# Patient Record
Sex: FEMALE | Race: WHITE | NOT HISPANIC OR LATINO | Employment: FULL TIME | URBAN - METROPOLITAN AREA
[De-identification: names, ages, dates, MRNs, and addresses within clinical notes are randomized per-mention and may not be internally consistent; named-entity substitution may affect disease eponyms.]

---

## 2017-03-17 ENCOUNTER — TELEPHONE (OUTPATIENT)
Dept: FAMILY MEDICINE | Facility: CLINIC | Age: 57
End: 2017-03-17

## 2017-03-17 NOTE — TELEPHONE ENCOUNTER
Health Maintenance Due   Topic Date Due     ADVANCE DIRECTIVE PLANNING Q5 YRS (NO INBASKET)  09/19/1978     BMP Q6 MOS (NO INBASKET)  01/08/2017      BP Readings from Last 3 Encounters:   11/10/16 125/78   07/08/16 129/79   06/23/15 126/70      11/2016 A1C 8.1 she wanted to try lifestyle management, not meds.    Please call patient and ask her to schedule a diabetes visit with me when she gets a chance, thanks! -Gretchen

## 2017-05-12 ENCOUNTER — TELEPHONE (OUTPATIENT)
Dept: FAMILY MEDICINE | Facility: CLINIC | Age: 57
End: 2017-05-12

## 2017-05-12 NOTE — TELEPHONE ENCOUNTER
Health Maintenance Due   Topic Date Due     ADVANCE DIRECTIVE PLANNING Q5 YRS (NO INBASKET)  09/19/1978     BMP Q6 MOS (NO INBASKET)  01/08/2017     A1C Q3 MO( NO INBASKET)  02/10/2017      A1C 8.5, refused metformin 10/11/2016  Please call her to to schedule a follow up diabetes appointment with me,  thanks!  -Gretchen

## 2017-06-07 PROBLEM — E11.65 TYPE 2 DIABETES MELLITUS WITH HYPERGLYCEMIA, WITHOUT LONG-TERM CURRENT USE OF INSULIN (H): Status: ACTIVE | Noted: 2017-06-07

## 2017-06-07 PROBLEM — E78.1 HIGH BLOOD TRIGLYCERIDES: Status: ACTIVE | Noted: 2017-06-07

## 2017-06-08 ENCOUNTER — OFFICE VISIT (OUTPATIENT)
Dept: FAMILY MEDICINE | Facility: CLINIC | Age: 57
End: 2017-06-08
Payer: COMMERCIAL

## 2017-06-08 VITALS
WEIGHT: 258 LBS | OXYGEN SATURATION: 93 % | DIASTOLIC BLOOD PRESSURE: 75 MMHG | SYSTOLIC BLOOD PRESSURE: 121 MMHG | BODY MASS INDEX: 34.95 KG/M2 | TEMPERATURE: 97.8 F | HEART RATE: 69 BPM | HEIGHT: 72 IN

## 2017-06-08 DIAGNOSIS — E11.65 TYPE 2 DIABETES MELLITUS WITH HYPERGLYCEMIA, WITHOUT LONG-TERM CURRENT USE OF INSULIN (H): Primary | ICD-10-CM

## 2017-06-08 DIAGNOSIS — E78.1 HIGH BLOOD TRIGLYCERIDES: ICD-10-CM

## 2017-06-08 DIAGNOSIS — E66.01 MORBID OBESITY DUE TO EXCESS CALORIES (H): ICD-10-CM

## 2017-06-08 DIAGNOSIS — Z71.89 ADVANCE CARE PLANNING: ICD-10-CM

## 2017-06-08 LAB
ALBUMIN SERPL-MCNC: 3.5 G/DL (ref 3.4–5)
ALP SERPL-CCNC: 93 U/L (ref 40–150)
ALT SERPL W P-5'-P-CCNC: 29 U/L (ref 0–50)
ANION GAP SERPL CALCULATED.3IONS-SCNC: 9 MMOL/L (ref 3–14)
AST SERPL W P-5'-P-CCNC: 27 U/L (ref 0–45)
BILIRUB SERPL-MCNC: 0.5 MG/DL (ref 0.2–1.3)
BUN SERPL-MCNC: 15 MG/DL (ref 7–30)
CALCIUM SERPL-MCNC: 9.6 MG/DL (ref 8.5–10.1)
CHLORIDE SERPL-SCNC: 101 MMOL/L (ref 94–109)
CHOLEST SERPL-MCNC: 162 MG/DL
CO2 SERPL-SCNC: 28 MMOL/L (ref 20–32)
CREAT SERPL-MCNC: 0.61 MG/DL (ref 0.52–1.04)
GFR SERPL CREATININE-BSD FRML MDRD: ABNORMAL ML/MIN/1.7M2
GLUCOSE SERPL-MCNC: 171 MG/DL (ref 70–99)
HBA1C MFR BLD: 8.2 % (ref 4.3–6)
HDLC SERPL-MCNC: 38 MG/DL
LDLC SERPL CALC-MCNC: 76 MG/DL
NONHDLC SERPL-MCNC: 124 MG/DL
POTASSIUM SERPL-SCNC: 3.5 MMOL/L (ref 3.4–5.3)
PROT SERPL-MCNC: 8.1 G/DL (ref 6.8–8.8)
SODIUM SERPL-SCNC: 138 MMOL/L (ref 133–144)
TRIGL SERPL-MCNC: 240 MG/DL

## 2017-06-08 PROCEDURE — 36415 COLL VENOUS BLD VENIPUNCTURE: CPT | Performed by: FAMILY MEDICINE

## 2017-06-08 PROCEDURE — 80053 COMPREHEN METABOLIC PANEL: CPT | Performed by: FAMILY MEDICINE

## 2017-06-08 PROCEDURE — 80061 LIPID PANEL: CPT | Performed by: FAMILY MEDICINE

## 2017-06-08 PROCEDURE — 83036 HEMOGLOBIN GLYCOSYLATED A1C: CPT | Performed by: FAMILY MEDICINE

## 2017-06-08 PROCEDURE — 99214 OFFICE O/P EST MOD 30 MIN: CPT | Performed by: FAMILY MEDICINE

## 2017-06-08 NOTE — MR AVS SNAPSHOT
After Visit Summary   6/8/2017    Lesli Jackson    MRN: [id number]           Patient Information     Date Of Birth          1960        Visit Information        Provider Department      6/8/2017 8:20 AM Gretchen Vargas MD [facility]        Today's Diagnoses     Type 2 diabetes mellitus with hyperglycemia, without long-term current use of insulin (H)    -  1    Morbid obesity due to excess calories (H)        Obesity, Class II, BMI 35.0-39.9, with comorbidity (see actual BMI) (H)        High blood triglycerides          Care Instructions    Results for orders placed or performed in visit on 06/08/17   Hemoglobin A1c   Result Value Ref Range    Hemoglobin A1C 8.2 (H) 4.3 - 6.0 %                Follow-ups after your visit        Who to contact     If you have questions or need follow up information about today's clinic visit or your schedule please contact [facility] directly at [phone].  Normal or non-critical lab and imaging results will be communicated to you by PartyLinehart, letter or phone within 4 business days after the clinic has received the results. If you do not hear from us within 7 days, please contact the clinic through Preventicet or phone. If you have a critical or abnormal lab result, we will notify you by phone as soon as possible.  Submit refill requests through benchee or call your pharmacy and they will forward the refill request to us. Please allow 3 business days for your refill to be completed.          Additional Information About Your Visit        MyChart Information     benchee gives you secure access to your electronic health record. If you see a primary care provider, you can also send messages to your care team and make appointments. If you have questions, please call your primary care clinic.  If you do not have a primary care provider, please call [phone] and they will assist you.        Care EveryWhere ID     This is your Care  EveryWhere ID. This could be used by other organizations to access your [place] medical records  [id number]        Your Vitals Were     Pulse Temperature Height Pulse Oximetry BMI (Body Mass Index)       69 97.8  F (36.6  C) (Tympanic) 6' (1.829 m) 93% 34.99 kg/m2        Blood Pressure from Last 3 Encounters:   06/08/17 121/75   11/10/16 125/78   07/08/16 129/79    Weight from Last 3 Encounters:   06/08/17 258 lb (117 kg)   11/10/16 263 lb (119.3 kg)   07/08/16 267 lb (121.1 kg)              We Performed the Following     Comprehensive metabolic panel     Hemoglobin A1c     Lipid panel reflex to direct LDL        Primary Care Provider Office Phone # Fax #    Gretchen Vargas [phone]       [facility] [address]  [facility] [address]        Thank you!     Thank you for choosing [facility]  for your care. Our goal is always to provide you with excellent care. Hearing back from our patients is one way we can continue to improve our services. Please take a few minutes to complete the written survey that you may receive in the mail after your visit with us. Thank you!             Your Updated Medication List - Protect others around you: Learn how to safely use, store and throw away your medicines at www.disposemymeds.org.          This list is accurate as of: 6/8/17  8:53 AM.  Always use your most recent med list.                   Brand Name Dispense Instructions for use    aspirin 81 MG tablet     100    ONE DAILY       atenolol 50 MG tablet    TENORMIN    90 tablet    Take 1 tablet (50 mg) by mouth daily       blood glucose monitoring lancets          flaxseed oil 1000 MG Caps     90 capsule    Take 2 capsules by mouth daily.       hydrochlorothiazide 50 MG tablet    HYDRODIURIL    90 tablet    Take 1 tablet (50 mg) by mouth every morning       lisinopril 40 MG tablet    PRINIVIL/ZESTRIL    135 tablet    Take 1.5 tablets (60 mg) by mouth daily        lovastatin 20 MG tablet    MEVACOR    45 tablet    Take 0.5 tablets (10 mg) by mouth At Bedtime 1/2 tablet a day       MULTIPLE VIT-MIN-CALCIUM-FA PO      Take  by mouth.

## 2017-06-08 NOTE — PROGRESS NOTES
Patient was seen today in clinic.  I discussed results in clinic, please see clinic progress note.    Gretchen Vargas 6/8/2017

## 2017-06-08 NOTE — PROGRESS NOTES
SUBJECTIVE:                                                    Lesli Jackson is a 56 year old female who presents to clinic today for the following health issues:      History of Present Illness   Diabetes:     Frequency of checking blood sugars::  Not at all    Diabetic concerns::  None    Hypoglycemia symptoms::  None    Paraesthesia present::  No    Eye Exam in the last year::  NO  Diet::  Vegetarian/vegan  Frequency of exercise::  6-7 days/week  Duration of exercise::  30-45 minutes  Taking medications regularly::  Yes  Medication side effects::  Not applicable  Additional concerns today::  No    Hyperlipidemia  She's currently on statin, trying to improve her diet.She's walking more now, eating a little better, has lost some weight.  She has more trouble exercising in the winter, doesn't like to walk outside.  Does have a treadmill.  She tries to exercise after eating.    Wt Readings from Last 4 Encounters:   06/08/17 258 lb (117 kg)   11/10/16 263 lb (119.3 kg)   07/08/16 267 lb (121.1 kg)   06/23/15 267 lb (121.1 kg)      Problem list and histories reviewed & adjusted, as indicated.  Additional history: as documented        Patient Active Problem List   Diagnosis     Mixed hyperlipidemia     Other specified idiopathic peripheral neuropathy     HYPERLIPIDEMIA LDL GOAL <130     Idiopathic edema     Nevus     Benign neoplasm of skin of trunk, except scrotum     Skin tag     Essential hypertension with goal blood pressure less than 140/90     Swelling of limb     Localized swelling, mass and lump, neck     Tinea corporis     Morbid obesity due to excess calories (H)     Obesity, Class II, BMI 35.0-39.9, with comorbidity (see actual BMI) (H)     Abnormal vaginal bleeding     Type 2 diabetes mellitus with hyperglycemia, without long-term current use of insulin (H)     High blood triglycerides     Past Surgical History:   Procedure Laterality Date     COLONOSCOPY  2/16/11    repeat 3 yrs, one inconclusive area        Social History   Substance Use Topics     Smoking status: Former Smoker     Years: 20.00     Smokeless tobacco: Former User      Comment: 5 cigarettes day or less     Alcohol use Yes      Comment: 1-2  beer daily on average     Family History   Problem Relation Age of Onset     C.A.D. Father      DIABETES Father      CEREBROVASCULAR DISEASE Father      Eye Disorder Father      cataracts     HEART DISEASE Father      Hypertension Mother      Arthritis Mother      CANCER Mother      lymphoma     Prostate Cancer Maternal Uncle      Allergies Sister      Eye Disorder Other      iritis     Lipids Other      one of her parents     Thyroid Disease Sister          Current Outpatient Prescriptions   Medication Sig Dispense Refill     lisinopril (PRINIVIL,ZESTRIL) 40 MG tablet Take 1.5 tablets (60 mg) by mouth daily 135 tablet 3     hydrochlorothiazide (HYDRODIURIL) 50 MG tablet Take 1 tablet (50 mg) by mouth every morning 90 tablet 3     atenolol (TENORMIN) 50 MG tablet Take 1 tablet (50 mg) by mouth daily 90 tablet 3     lovastatin (MEVACOR) 20 MG tablet Take 0.5 tablets (10 mg) by mouth At Bedtime 1/2 tablet a day 45 tablet 3     blood glucose monitoring (ACCU-CHEK FASTCLIX) lancets        MULTIPLE VIT-MIN-CALCIUM-FA PO Take  by mouth.       Flaxseed, Linseed, (FLAXSEED OIL) 1000 MG CAPS Take 2 capsules by mouth daily. 90 capsule 0     ASPIRIN 81 MG OR TABS ONE DAILY 100 3     Allergies   Allergen Reactions     Amoxicillin Rash     Recent Labs   Lab Test  06/08/17   0827  11/10/16   1023  07/08/16   0957  06/23/15   0941  06/02/14   0808   A1C  8.2*  8.5*  8.7*  7.0*  6.5*   LDL   --    --   85  72  85   HDL   --    --   35*  36*  30*   TRIG   --    --   258*  246*  264*   ALT   --    --   35  27  22   CR   --    --   0.74  0.71  0.85   GFRESTIMATED   --    --   81  85  70   GFRESTBLACK   --    --   >90   GFR Calc    >90   GFR Calc    85   POTASSIUM   --    --   3.6  3.8  4.2   TSH   --     --   3.16   --    --       BP Readings from Last 3 Encounters:   06/08/17 121/75   11/10/16 125/78   07/08/16 129/79    Wt Readings from Last 3 Encounters:   06/08/17 258 lb (117 kg)   11/10/16 263 lb (119.3 kg)   07/08/16 267 lb (121.1 kg)         Triglycerides   Date Value Ref Range Status   07/08/2016 258 (H) <150 mg/dL Final     Comment:     Borderline high:  150-199 mg/dl   High:             200-499 mg/dl   Very high:       >499 mg/dl   Fasting specimen     ]  ROS:  Constitutional, HEENT, cardiovascular, pulmonary, gi and gu systems are negative, except as otherwise noted.    OBJECTIVE:                                                    /75  Pulse 69  Temp 97.8  F (36.6  C) (Tympanic)  Ht 6' (1.829 m)  Wt 258 lb (117 kg)  SpO2 93%  BMI 34.99 kg/m2  Body mass index is 34.99 kg/(m^2).  GENERAL: healthy, alert and no distress  NECK: no adenopathy, no asymmetry, masses, or scars and thyroid normal to palpation  RESP: lungs clear to auscultation - no rales, rhonchi or wheezes  CV: regular rate and rhythm, normal S1 S2, no S3 or S4, no murmur, click or rub, no peripheral edema and peripheral pulses strong  ABDOMEN: soft, nontender, no hepatosplenomegaly, no masses and bowel sounds normal  MS: no gross musculoskeletal defects noted, no edema    Diagnostic Test Results:  Results for orders placed or performed in visit on 06/08/17 (from the past 24 hour(s))   Hemoglobin A1c   Result Value Ref Range    Hemoglobin A1C 8.2 (H) 4.3 - 6.0 %        ASSESSMENT/PLAN:                                                        1. Type 2 diabetes mellitus with hyperglycemia, without long-term current use of insulin (H)  Improving, recheck 3 months  - Hemoglobin A1c; Future  - Comprehensive metabolic panel; Future  - Hemoglobin A1c  - Comprehensive metabolic panel    2. Morbid obesity due to excess calories (H)  3. Obesity, Class II, BMI 35.0-39.9, with comorbidity (see actual BMI) (H)  Losing weight with exercise,  lifestyle    4. High blood triglycerides  Triglycerides   Date Value Ref Range Status   07/08/2016 258 (H) <150 mg/dL Final     Comment:     Borderline high:  150-199 mg/dl   High:             200-499 mg/dl   Very high:       >499 mg/dl   Fasting specimen     ]  Will fu as indicated.   On statin  - Lipid panel reflex to direct LDL; Future  - Lipid panel reflex to direct LDL    Advanced care plan: paperwork discussed, provided to patient today.  Gretchen Vargas MD  [facility]  Answers for HPI/ROS submitted by the patient on 6/8/2017   PHQ-2 Score: 0

## 2017-06-08 NOTE — NURSING NOTE
Chief Complaint   Patient presents with     Diabetes       Initial /75  Pulse 69  Temp 97.8  F (36.6  C) (Tympanic)  Ht 6' (1.829 m)  Wt 258 lb (117 kg)  SpO2 93%  BMI 34.99 kg/m2 Estimated body mass index is 34.99 kg/(m^2) as calculated from the following:    Height as of this encounter: 6' (1.829 m).    Weight as of this encounter: 258 lb (117 kg).  Medication Reconciliation: complete     Ethel Rodrigues MA

## 2017-06-08 NOTE — PATIENT INSTRUCTIONS
Results for orders placed or performed in visit on 06/08/17   Hemoglobin A1c   Result Value Ref Range    Hemoglobin A1C 8.2 (H) 4.3 - 6.0 %

## 2017-06-09 NOTE — PROGRESS NOTES
Hello!  It was a pleasure to see you in clinic!  Thank you for getting labs done.     The testing of your blood sugar, kidney function, liver function and electrolytes was normal.     Your triglycerides are still elevated.  It sounds like you've already tried to lower the amount of sugar ,alcohol and sweets in your diet helps to control this, but it's only come down very slightly.    Weight loss would help; please let me know if you'd like to go to one of our weight loss centers to discuss options.    If you have any questions, please contact the clinic or schedule an appointment with me, thank you!    Sincerely,  Dr. Gretchen Vargas MD  6/9/2017

## 2017-06-28 DIAGNOSIS — I10 ESSENTIAL HYPERTENSION WITH GOAL BLOOD PRESSURE LESS THAN 140/90: ICD-10-CM

## 2017-06-28 NOTE — TELEPHONE ENCOUNTER
Medication Detail      Disp Refills Start End PAPITO   atenolol (TENORMIN) 50 MG tablet 90 tablet 3 7/8/2016  No   Sig: Take 1 tablet (50 mg) by mouth daily       Last Office Visit with [facility], P or [facility] prescribing provider:  6/8/17   Future Office Visit:    Next 5 appointments (look out 90 days)     Sep 08, 2017  9:20 AM CDT   PHYSICAL with Gretchen Vargas MD   [facility][note 3] ([facility][note 3[facility]    [address] [place] [id number] [phone]                    BP Readings from Last 3 Encounters:   06/08/17 121/75   11/10/16 125/78   07/08/16 129/79

## 2017-06-29 RX ORDER — ATENOLOL 50 MG/1
TABLET ORAL
Qty: 90 TABLET | Refills: 0 | Status: SHIPPED | OUTPATIENT
Start: 2017-06-29 | End: 2017-08-24

## 2017-06-29 NOTE — TELEPHONE ENCOUNTER
HTN last addressed 11/10/2017    Prescription approved per [facility] Refill Protocol.    Signed Prescriptions:                        Disp   Refills    atenolol (TENORMIN) 50 MG tablet           90 tab*0        Sig: TAKE 1 TABLET(50 MG) BY MOUTH DAILY  Authorizing Provider: SAUNDRA MAYO  Ordering User: DEBI DOUGLAS      Closing encounter - no further actions needed at this time    Debi Douglas RN

## 2017-07-30 DIAGNOSIS — E78.5 HYPERLIPIDEMIA LDL GOAL <130: ICD-10-CM

## 2017-07-30 NOTE — TELEPHONE ENCOUNTER
Medication Detail      Disp Refills Start End PAPITO   lovastatin (MEVACOR) 20 MG tablet 45 tablet 3 7/8/2016  No   Sig: Take 0.5 tablets (10 mg) by mouth At Bedtime 1/2 tablet a day            Last Office Visit with [facility], [facility] or [facility] prescribing provider: 6/8/2017  Next 5 appointments (look out 90 days)     Sep 08, 2017  9:20 AM CDT   PHYSICAL with Gretchen Vargas MD   [facility] ([facility])    [address] [place] [id number] [phone]                   Lab Results   Component Value Date    CHOL 162 06/08/2017     Lab Results   Component Value Date    HDL 38 06/08/2017     Lab Results   Component Value Date    LDL 76 06/08/2017     Lab Results   Component Value Date    TRIG 240 06/08/2017     Lab Results   Component Value Date    CHOLHDLRATIO 4.4 06/23/2015

## 2017-07-31 RX ORDER — LOVASTATIN 20 MG
TABLET ORAL
Qty: 45 TABLET | Refills: 3 | Status: SHIPPED | OUTPATIENT
Start: 2017-07-31 | End: 2018-07-19

## 2017-07-31 NOTE — TELEPHONE ENCOUNTER
Prescription approved per [facility] Refill Protocol.    Signed Prescriptions:                        Disp   Refills    lovastatin (MEVACOR) 20 MG tablet          45 tab*3        Sig: TAKE 1/2 TABLET BY MOUTH EVERY NIGHT AT BEDTIME  Authorizing Provider: SAUNDRA MAYO  Ordering User: DEBI DOUGLAS      Closing encounter - no further actions needed at this time    Debi Douglas RN

## 2017-09-08 ENCOUNTER — OFFICE VISIT (OUTPATIENT)
Dept: FAMILY MEDICINE | Facility: CLINIC | Age: 57
End: 2017-09-08
Payer: COMMERCIAL

## 2017-09-08 VITALS
OXYGEN SATURATION: 96 % | SYSTOLIC BLOOD PRESSURE: 122 MMHG | WEIGHT: 257 LBS | TEMPERATURE: 97.2 F | DIASTOLIC BLOOD PRESSURE: 68 MMHG | HEART RATE: 72 BPM | RESPIRATION RATE: 12 BRPM | BODY MASS INDEX: 34.86 KG/M2

## 2017-09-08 DIAGNOSIS — N93.9 ABNORMAL VAGINAL BLEEDING: ICD-10-CM

## 2017-09-08 DIAGNOSIS — Z00.00 WELL WOMAN EXAM WITHOUT GYNECOLOGICAL EXAM: Primary | ICD-10-CM

## 2017-09-08 DIAGNOSIS — E11.65 TYPE 2 DIABETES MELLITUS WITH HYPERGLYCEMIA, WITHOUT LONG-TERM CURRENT USE OF INSULIN (H): ICD-10-CM

## 2017-09-08 DIAGNOSIS — I10 ESSENTIAL HYPERTENSION WITH GOAL BLOOD PRESSURE LESS THAN 140/90: ICD-10-CM

## 2017-09-08 DIAGNOSIS — E78.1 HIGH BLOOD TRIGLYCERIDES: ICD-10-CM

## 2017-09-08 LAB
CREAT UR-MCNC: 167 MG/DL
HBA1C MFR BLD: 7.7 % (ref 4.3–6)
MICROALBUMIN UR-MCNC: 10 MG/L
MICROALBUMIN/CREAT UR: 6.05 MG/G CR (ref 0–25)

## 2017-09-08 PROCEDURE — 83036 HEMOGLOBIN GLYCOSYLATED A1C: CPT | Performed by: FAMILY MEDICINE

## 2017-09-08 PROCEDURE — 36415 COLL VENOUS BLD VENIPUNCTURE: CPT | Performed by: FAMILY MEDICINE

## 2017-09-08 PROCEDURE — 80053 COMPREHEN METABOLIC PANEL: CPT | Performed by: FAMILY MEDICINE

## 2017-09-08 PROCEDURE — 82043 UR ALBUMIN QUANTITATIVE: CPT | Performed by: FAMILY MEDICINE

## 2017-09-08 PROCEDURE — 99207 C FOOT EXAM  NO CHARGE: CPT | Mod: 25 | Performed by: FAMILY MEDICINE

## 2017-09-08 PROCEDURE — 80061 LIPID PANEL: CPT | Performed by: FAMILY MEDICINE

## 2017-09-08 PROCEDURE — 99396 PREV VISIT EST AGE 40-64: CPT | Performed by: FAMILY MEDICINE

## 2017-09-08 NOTE — PATIENT INSTRUCTIONS
Wt Readings from Last 4 Encounters:   09/08/17 257 lb (116.6 kg)   06/08/17 258 lb (117 kg)   11/10/16 263 lb (119.3 kg)   07/08/16 267 lb (121.1 kg)       Preventive Health Recommendations  Female Ages 50 - 64    Yearly exam: See your health care provider every year in order to  o Review health changes.   o Discuss preventive care.    o Review your medicines if your doctor has prescribed any.      Get a Pap test every three years (unless you have an abnormal result and your provider advises testing more often).    If you get Pap tests with HPV test, you only need to test every 5 years, unless you have an abnormal result.     You do not need a Pap test if your uterus was removed (hysterectomy) and you have not had cancer.    You should be tested each year for STDs (sexually transmitted diseases) if you're at risk.     Have a mammogram every 1 to 2 years.    Have a colonoscopy at age 50, or have a yearly FIT test (stool test). These exams screen for colon cancer.      Have a cholesterol test every 5 years, or more often if advised.    Have a diabetes test (fasting glucose) every three years. If you are at risk for diabetes, you should have this test more often.     If you are at risk for osteoporosis (brittle bone disease), think about having a bone density scan (DEXA).    Shots: Get a flu shot each year. Get a tetanus shot every 10 years.    Nutrition:     Eat at least 5 servings of fruits and vegetables each day.    Eat whole-grain bread, whole-wheat pasta and brown rice instead of white grains and rice.    Talk to your provider about Calcium and Vitamin D.     Lifestyle    Exercise at least 150 minutes a week (30 minutes a day, 5 days a week). This will help you control your weight and prevent disease.    Limit alcohol to one drink per day.    No smoking.     Wear sunscreen to prevent skin cancer.     See your dentist every six months for an exam and cleaning.    See your eye doctor every 1 to 2 years.

## 2017-09-08 NOTE — PROGRESS NOTES
SUBJECTIVE:   CC: Lesli Jackson is an 56 year old woman who presents for preventive health visit.     Healthy Habits:    Do you get at least three servings of calcium containing foods daily (dairy, green leafy vegetables, etc.)? yes    Amount of exercise or daily activities, outside of work: 7 day(s) per week    Problems taking medications regularly No    Medication side effects: No    Have you had an eye exam in the past two years? no    Do you see a dentist twice per year? no  Do you have sleep apnea, excessive snoring or daytime drowsiness?no      Diabetes Follow-up      Patient is checking blood sugars: not at all    Diabetic concerns: None     Symptoms of hypoglycemia (low blood sugar): wooziness, not associated with meals, may be blood pressure related (see below)     Paresthesias (numbness or burning in feet) or sores: No     Date of last diabetic eye exam: annually    Hyperlipidemia Follow-Up      Rate your low fat/cholesterol diet?: good    Taking statin?  Yes, Lovastatin 10 mg qhs    Other lipid medications/supplements?:  Flax seed    Hypertension Follow-up      Outpatient blood pressures are being checked at home.  Results are 108 - 129/68 - 82    Pulse 71 - 85    Sometimes feels dizzy, describes as temporary wooziness at any time, sometimes while seated, not related to standing up quickly, doesn't seem related to particular BP's or pulse.    Low Salt Diet: no added salt          Today's PHQ-2 Score:   PHQ-2 ( 1999 Pfizer) 6/8/2017 11/10/2016   Q1: Little interest or pleasure in doing things 0 0   Q2: Feeling down, depressed or hopeless 0 0   PHQ-2 Score 0 0   Q1: Little interest or pleasure in doing things Not at all -   Q2: Feeling down, depressed or hopeless Not at all -   PHQ-2 Score 0 -         Abuse: Current or Past(Physical, Sexual or Emotional)- No  Do you feel safe in your environment - Yes  Social History   Substance Use Topics     Smoking status: Former Smoker     Years: 20.00     Smokeless  tobacco: Former User      Comment: 5 cigarettes day or less     Alcohol use Yes      Comment: 1-2  beer daily on average     The patient does not drink >3 drinks per day nor >7 drinks per week.    Reviewed orders with patient.  Reviewed health maintenance and updated orders accordingly - Yes  BP Readings from Last 3 Encounters:   09/08/17 122/68   06/08/17 121/75   11/10/16 125/78    Wt Readings from Last 3 Encounters:   09/08/17 257 lb (116.6 kg)   06/08/17 258 lb (117 kg)   11/10/16 263 lb (119.3 kg)                  Current Outpatient Prescriptions   Medication Sig Dispense Refill     carvedilol (COREG CR) 20 MG 24 hr capsule Take 1 capsule (20 mg) by mouth daily 90 capsule 1     lovastatin (MEVACOR) 20 MG tablet TAKE 1/2 TABLET BY MOUTH EVERY NIGHT AT BEDTIME 45 tablet 3     lisinopril (PRINIVIL,ZESTRIL) 40 MG tablet Take 1.5 tablets (60 mg) by mouth daily 135 tablet 3     hydrochlorothiazide (HYDRODIURIL) 50 MG tablet Take 1 tablet (50 mg) by mouth every morning 90 tablet 3     blood glucose monitoring (ACCU-CHEK FASTCLIX) lancets        MULTIPLE VIT-MIN-CALCIUM-FA PO Take  by mouth.       Flaxseed, Linseed, (FLAXSEED OIL) 1000 MG CAPS Take 2 capsules by mouth daily. 90 capsule 0     ASPIRIN 81 MG OR TABS ONE DAILY 100 3     Allergies   Allergen Reactions     Amoxicillin Rash     Recent Labs   Lab Test  06/08/17   0827  11/10/16   1023  07/08/16   0957  06/23/15   0941   A1C  8.2*  8.5*  8.7*  7.0*   LDL  76   --   85  72   HDL  38*   --   35*  36*   TRIG  240*   --   258*  246*   ALT  29   --   35  27   CR  0.61   --   0.74  0.71   GFRESTIMATED  >90  Non  GFR Calc     --   81  85   GFRESTBLACK  >90   GFR Calc     --   >90   GFR Calc    >90   GFR Calc     POTASSIUM  3.5   --   3.6  3.8   TSH   --    --   3.16   --       Health Maintenance Due   Topic Date Due     FOOT EXAM Q1 YEAR  07/08/2017     EYE EXAM Q1 YEAR  07/08/2017     MICROALBUMIN Q1  YEAR  2017     INFLUENZA VACCINE (SYSTEM ASSIGNED)  2017     A1C Q3 MO  2017      Patient over age 50, mutual decision to screen reflected in health maintenance.    Pertinent mammograms are reviewed under the imaging tab.  History of abnormal Pap smear:   Last 3 Pap Results:   PAP (no units)   Date Value   2016 NIL   2013 NIL   06/15/2010 NIL       Reviewed and updated as needed this visit by clinical staffTobacco  Allergies  Meds  Med Hx  Surg Hx  Fam Hx  Soc Hx        Reviewed and updated as needed this visit by Provider        Past Medical History:   Diagnosis Date     Baker's cyst of knee     Lt (7.3x4.3x1.6 cm)     Elevated glucose      Hyperlipidemia LDL goal <130      Hypertension goal BP (blood pressure) < 140/90      Idiopathic edema      NONSPECIFIC MEDICAL HISTORY     nephrolithiasis     Personal history of smoking     quit in      Secondary lymphedema 19    DX by [facility] [place], lose wt. at this point     Type 2 diabetes, HbA1c goal < 7% (H)      Unspecified iridocyclitis       Past Surgical History:   Procedure Laterality Date     COLONOSCOPY  11    repeat 3 yrs, one inconclusive area     Obstetric History       T0      L0     SAB1   TAB0   Ectopic0   Multiple0   Live Births0       # Outcome Date GA Lbr Bryn/2nd Weight Sex Delivery Anes PTL Lv   1 TAB                 Family History   Problem Relation Age of Onset     C.A.D. Father      DIABETES Father      CEREBROVASCULAR DISEASE Father      Eye Disorder Father      cataracts     HEART DISEASE Father      Hypertension Mother      Arthritis Mother      CANCER Mother      lymphoma     Prostate Cancer Maternal Uncle      Allergies Sister      Eye Disorder Other      iritis     Lipids Other      one of her parents     Thyroid Disease Sister         ROS:  C: NEGATIVE for fever, chills, change in weight  I: NEGATIVE for worrisome rashes, moles or lesions  E: NEGATIVE for vision changes or  irritation  ENT: NEGATIVE for ear, mouth and throat problems  R: NEGATIVE for significant cough or SOB  B: NEGATIVE for masses, tenderness or discharge  CV: NEGATIVE for chest pain, palpitations or peripheral edema  GI: NEGATIVE for nausea, abdominal pain, heartburn, or change in bowel habits  : NEGATIVE for unusual urinary or vaginal symptoms. No vaginal bleeding.  M: NEGATIVE for significant arthralgias or myalgia  N: NEGATIVE for weakness, dizziness or paresthesias  E: NEGATIVE for temperature intolerance, skin/hair changes  H: NEGATIVE for bleeding problems  P: NEGATIVE for changes in mood or affect     OBJECTIVE:   /68 (BP Location: Left arm, Patient Position: Chair, Cuff Size: Adult Large)  Pulse 72  Temp 97.2  F (36.2  C) (Tympanic)  Resp 12  Wt 257 lb (116.6 kg)  SpO2 96%  BMI 34.86 kg/m2  EXAM:  GENERAL APPEARANCE: healthy, alert and no distress  EYES: Eyes grossly normal to inspection, PERRL and conjunctivae and sclerae normal  HENT: ear canals and TM's normal, nose and mouth without ulcers or lesions, oropharynx clear and oral mucous membranes moist  NECK: no adenopathy, no asymmetry, masses, or scars and thyroid normal to palpation  RESP: lungs clear to auscultation - no rales, rhonchi or wheezes  CV: regular rate and rhythm, normal S1 S2, no S3 or S4, no murmur, click or rub, no peripheral edema and peripheral pulses strong  ABDOMEN: soft, nontender, no hepatosplenomegaly, no masses and bowel sounds normal  MS: no musculoskeletal defects are noted and gait is age appropriate without ataxia  SKIN: no suspicious lesions or rashes  NEURO: Normal strength and tone, sensory exam grossly normal, mentation intact and speech normal  DIABETIC FOOT EXAM: normal DP and PT pulses, no trophic changes or ulcerative lesions, normal sensory exam and normal monofilament exam  PSYCH: mentation appears normal and affect normal/bright    ASSESSMENT/PLAN:   1. Well woman exam without gynecological  exam  routine    2. Essential hypertension with goal blood pressure less than 140/90  BP Readings from Last 3 Encounters:   09/08/17 122/68   06/08/17 121/75   11/10/16 125/78    at goal  No med changes today  - Comprehensive metabolic panel  - Albumin Random Urine Quantitative with Creat Ratio    3. Type 2 diabetes mellitus with hyperglycemia, without long-term current use of insulin (H)  She has been trying to get A1C with diet and exercise, has been successful, will recheck today  - Hemoglobin A1c  - Comprehensive metabolic panel  - C FOOT EXAM  NO CHARGE    4. High blood triglycerides  Triglycerides   Date Value Ref Range Status   06/08/2017 240 (H) <150 mg/dL Final     Comment:     Borderline high:  150-199 mg/dl   High:             200-499 mg/dl   Very high:       >499 mg/dl   Fasting specimen     ]  Recheck today  - Comprehensive metabolic panel    5. Abnormal vaginal bleeding  Now resolved, no bleeding for 6 months, Lesli will let me know if this reoccurs      Flu declined    COUNSELING:   Reviewed preventive health counseling, as reflected in patient instructions     reports that she has quit smoking. She quit after 20.00 years of use. She has quit using smokeless tobacco.    Estimated body mass index is 34.86 kg/(m^2) as calculated from the following:    Height as of 6/8/17: 6' (1.829 m).    Weight as of this encounter: 257 lb (116.6 kg).   Weight management plan: CONTINUE CURRENT EXERCISING AND DIET    Counseling Resources:  ATP IV Guidelines  Pooled Cohorts Equation Calculator  Breast Cancer Risk Calculator  FRAX Risk Assessment  ICSI Preventive Guidelines  Dietary Guidelines for Americans, 2010  USDA's MyPlate  ASA Prophylaxis  Lung CA Screening    Gretchen Vargas MD  [facility]

## 2017-09-08 NOTE — NURSING NOTE
Chief Complaint   Patient presents with     Physical       Initial /68 (BP Location: Left arm, Patient Position: Chair, Cuff Size: Adult Large)  Pulse 72  Temp 97.2  F (36.2  C) (Tympanic)  Resp 12  Wt 257 lb (116.6 kg)  SpO2 96%  BMI 34.86 kg/m2 Estimated body mass index is 34.86 kg/(m^2) as calculated from the following:    Height as of 6/8/17: 6' (1.829 m).    Weight as of this encounter: 257 lb (116.6 kg).  Medication Reconciliation: complete     Genna Ortez, CMA

## 2017-09-08 NOTE — MR AVS SNAPSHOT
After Visit Summary   9/8/2017    Lesli Jackson    MRN: [id number]           Patient Information     Date Of Birth          1960        Visit Information        Provider Department      9/8/2017 9:20 AM Gretchen Vargas MD [facility]        Today's Diagnoses     Well woman exam without gynecological exam    -  1    Essential hypertension with goal blood pressure less than 140/90        Type 2 diabetes mellitus with hyperglycemia, without long-term current use of insulin (H)        High blood triglycerides        Abnormal vaginal bleeding          Care Instructions    Wt Readings from Last 4 Encounters:   09/08/17 257 lb (116.6 kg)   06/08/17 258 lb (117 kg)   11/10/16 263 lb (119.3 kg)   07/08/16 267 lb (121.1 kg)       Preventive Health Recommendations  Female Ages 50 - 64    Yearly exam: See your health care provider every year in order to  o Review health changes.   o Discuss preventive care.    o Review your medicines if your doctor has prescribed any.      Get a Pap test every three years (unless you have an abnormal result and your provider advises testing more often).    If you get Pap tests with HPV test, you only need to test every 5 years, unless you have an abnormal result.     You do not need a Pap test if your uterus was removed (hysterectomy) and you have not had cancer.    You should be tested each year for STDs (sexually transmitted diseases) if you're at risk.     Have a mammogram every 1 to 2 years.    Have a colonoscopy at age 50, or have a yearly FIT test (stool test). These exams screen for colon cancer.      Have a cholesterol test every 5 years, or more often if advised.    Have a diabetes test (fasting glucose) every three years. If you are at risk for diabetes, you should have this test more often.     If you are at risk for osteoporosis (brittle bone disease), think about having a bone density scan (DEXA).    Shots: Get a flu shot each year. Get a  tetanus shot every 10 years.    Nutrition:     Eat at least 5 servings of fruits and vegetables each day.    Eat whole-grain bread, whole-wheat pasta and brown rice instead of white grains and rice.    Talk to your provider about Calcium and Vitamin D.     Lifestyle    Exercise at least 150 minutes a week (30 minutes a day, 5 days a week). This will help you control your weight and prevent disease.    Limit alcohol to one drink per day.    No smoking.     Wear sunscreen to prevent skin cancer.     See your dentist every six months for an exam and cleaning.    See your eye doctor every 1 to 2 years.            Follow-ups after your visit        Who to contact     If you have questions or need follow up information about today's clinic visit or your schedule please contact [facility] directly at [phone].  Normal or non-critical lab and imaging results will be communicated to you by MyChart, letter or phone within 4 business days after the clinic has received the results. If you do not hear from us within 7 days, please contact the clinic through [facility] or phone. If you have a critical or abnormal lab result, we will notify you by phone as soon as possible.  Submit refill requests through [employer] or call your pharmacy and they will forward the refill request to us. Please allow 3 business days for your refill to be completed.          Additional Information About Your Visit        Med[employer] Information     [employer] gives you secure access to your electronic health record. If you see a primary care provider, you can also send messages to your care team and make appointments. If you have questions, please call your primary care clinic.  If you do not have a primary care provider, please call [phone] and they will assist you.        Care EveryWhere ID     This is your Care EveryWhere ID. This could be used by other organizations to access your [facility] medical records  [id number]        Your  Vitals Were     Pulse Temperature Respirations Pulse Oximetry BMI (Body Mass Index)       72 97.2  F (36.2  C) (Tympanic) 12 96% 34.86 kg/m2        Blood Pressure from Last 3 Encounters:   09/08/17 122/68   06/08/17 121/75   11/10/16 125/78    Weight from Last 3 Encounters:   09/08/17 257 lb (116.6 kg)   06/08/17 258 lb (117 kg)   11/10/16 263 lb (119.3 kg)              We Performed the Following     Albumin Random Urine Quantitative with Creat Ratio     C FOOT EXAM  NO CHARGE     Comprehensive metabolic panel     Hemoglobin A1c        Primary Care Provider Office Phone # Fax #    Gretchen Vargas [phone]       [address] [facility] [address]        Equal Access to Services     ZULEYMA SEARS : Hadii aad ku hadasho Soomaali, waaxda luqadaha, qaybta kaalmada adeegyada, jena walsh . So [facility] [phone].    ATENCIÓN: Si habla español, tiene a chiu disposición servicios gratuitos de asistencia lingüística. Llame al [phone].    We comply with applicable federal civil rights laws and [place] laws. We do not discriminate on the basis of race, color, national origin, age, disability sex, sexual orientation or gender identity.            Thank you!     Thank you for choosing [facility]  for your care. Our goal is always to provide you with excellent care. Hearing back from our patients is one way we can continue to improve our services. Please take a few minutes to complete the written survey that you may receive in the mail after your visit with us. Thank you!             Your Updated Medication List - Protect others around you: Learn how to safely use, store and throw away your medicines at www.disposemymeds.org.          This list is accurate as of: 9/8/17  9:52 AM.  Always use your most recent med list.                   Brand Name Dispense Instructions for use Diagnosis    aspirin 81 MG tablet     100    ONE DAILY        blood glucose  monitoring lancets           carvedilol 20 MG 24 hr capsule    COREG CR    90 capsule    Take 1 capsule (20 mg) by mouth daily    Essential hypertension with goal blood pressure less than 140/90       flaxseed oil 1000 MG Caps     90 capsule    Take 2 capsules by mouth daily.    Tired       hydrochlorothiazide 50 MG tablet    HYDRODIURIL    90 tablet    Take 1 tablet (50 mg) by mouth every morning    Essential hypertension with goal blood pressure less than 140/90       lisinopril 40 MG tablet    PRINIVIL/ZESTRIL    135 tablet    Take 1.5 tablets (60 mg) by mouth daily    Essential hypertension with goal blood pressure less than 140/90       lovastatin 20 MG tablet    MEVACOR    45 tablet    TAKE 1/2 TABLET BY MOUTH EVERY NIGHT AT BEDTIME    Hyperlipidemia LDL goal <130       MULTIPLE VIT-MIN-CALCIUM-FA PO      Take  by mouth.

## 2017-09-08 NOTE — PROGRESS NOTES
Great news, you've dropped your A1C by 0.5 point!!  It is now 7.7, down from 8.2 (and 8.7 at its highest). Great job, keep up the good work!    Sincerely,  Dr. Gretchen Vargas MD  9/8/2017

## 2017-09-09 LAB
ALBUMIN SERPL-MCNC: 3.6 G/DL (ref 3.4–5)
ALP SERPL-CCNC: 85 U/L (ref 40–150)
ALT SERPL W P-5'-P-CCNC: 26 U/L (ref 0–50)
ANION GAP SERPL CALCULATED.3IONS-SCNC: 5 MMOL/L (ref 3–14)
AST SERPL W P-5'-P-CCNC: 27 U/L (ref 0–45)
BILIRUB SERPL-MCNC: 0.6 MG/DL (ref 0.2–1.3)
BUN SERPL-MCNC: 16 MG/DL (ref 7–30)
CALCIUM SERPL-MCNC: 9.6 MG/DL (ref 8.5–10.1)
CHLORIDE SERPL-SCNC: 98 MMOL/L (ref 94–109)
CHOLEST SERPL-MCNC: 174 MG/DL
CO2 SERPL-SCNC: 34 MMOL/L (ref 20–32)
CREAT SERPL-MCNC: 0.7 MG/DL (ref 0.52–1.04)
GFR SERPL CREATININE-BSD FRML MDRD: 86 ML/MIN/1.7M2
GLUCOSE SERPL-MCNC: 175 MG/DL (ref 70–99)
HDLC SERPL-MCNC: 39 MG/DL
LDLC SERPL CALC-MCNC: 75 MG/DL
NONHDLC SERPL-MCNC: 135 MG/DL
POTASSIUM SERPL-SCNC: 3.8 MMOL/L (ref 3.4–5.3)
PROT SERPL-MCNC: 8.1 G/DL (ref 6.8–8.8)
SODIUM SERPL-SCNC: 137 MMOL/L (ref 133–144)
TRIGL SERPL-MCNC: 302 MG/DL

## 2017-09-12 NOTE — PROGRESS NOTES
Hello!  It was a pleasure to see you in clinic!  Thank you for getting labs done.     The testing of your blood sugar, kidney function, liver function and electrolytes was normal.     Your microalbumen is normal, which is great news. This means you do not have protein in the urine, and that your kidneys are currently functioning well. Keeping blood pressure and blood fats low is the best way to preserve your kidney function over your lifetime.    Your cholesterol panel has some high points and some low points!  Your total cholesterol and LDL cholesterols are very low, which is good news, but your triglycerides are still very high.  Please keep taking the Mevacor (lovastatin) as you have been doing, and try to minimize sugars, starches and simple carbohydrates in your diet. Let me know if you'd like to see a nutritionist.    Sincerely,  Dr. Gretchen Vargas MD  9/12/2017

## 2017-09-28 DIAGNOSIS — I10 ESSENTIAL HYPERTENSION WITH GOAL BLOOD PRESSURE LESS THAN 140/90: ICD-10-CM

## 2017-09-28 NOTE — TELEPHONE ENCOUNTER
lisinopril (PRINIVIL,ZESTRIL) 40 MG tablet      Last Written Prescription Date: 7/8/16  Last Fill Quantity: 135, # refills: 3  Last Office Visit with [facility], [facility] or [facility] prescribing provider: 9/8/17       Potassium   Date Value Ref Range Status   09/08/2017 3.8 3.4 - 5.3 mmol/L Final     Creatinine   Date Value Ref Range Status   09/08/2017 0.70 0.52 - 1.04 mg/dL Final     BP Readings from Last 3 Encounters:   09/08/17 122/68   06/08/17 121/75   11/10/16 125/78     hydrochlorothiazide (HYDRODIURIL) 50 MG tablet      Last Written Prescription Date: 7/8/16  Last Fill Quantity: 90, # refills: 3  Last Office Visit with [facility], [facility] or [facility] prescribing provider: 9/8/17       Potassium   Date Value Ref Range Status   09/08/2017 3.8 3.4 - 5.3 mmol/L Final     Creatinine   Date Value Ref Range Status   09/08/2017 0.70 0.52 - 1.04 mg/dL Final     BP Readings from Last 3 Encounters:   09/08/17 122/68   06/08/17 121/75   11/10/16 125/78

## 2017-10-02 RX ORDER — HYDROCHLOROTHIAZIDE 50 MG/1
TABLET ORAL
Qty: 90 TABLET | Refills: 2 | Status: SHIPPED | OUTPATIENT
Start: 2017-10-02 | End: 2018-06-24

## 2017-10-02 RX ORDER — LISINOPRIL 40 MG/1
TABLET ORAL
Qty: 135 TABLET | Refills: 2 | Status: SHIPPED | OUTPATIENT
Start: 2017-10-02 | End: 2018-06-24

## 2017-10-02 NOTE — TELEPHONE ENCOUNTER
Prescription approved per [facility] Refill Protocol.  Cindy Nelson RN

## 2017-10-24 DIAGNOSIS — I10 ESSENTIAL HYPERTENSION WITH GOAL BLOOD PRESSURE LESS THAN 140/90: ICD-10-CM

## 2017-10-26 RX ORDER — CARVEDILOL 6.25 MG/1
TABLET ORAL
Qty: 60 TABLET | Refills: 0 | OUTPATIENT
Start: 2017-10-26

## 2017-10-26 NOTE — TELEPHONE ENCOUNTER
Pt has refills on script from 8/24/17. Pharmacy notified to use that script.    DARLEEN LongN, RN  [facility]

## 2017-11-01 DIAGNOSIS — I10 ESSENTIAL HYPERTENSION WITH GOAL BLOOD PRESSURE LESS THAN 140/90: ICD-10-CM

## 2017-11-02 RX ORDER — CARVEDILOL 6.25 MG/1
TABLET ORAL
Qty: 180 TABLET | Refills: 3 | Status: SHIPPED | OUTPATIENT
Start: 2017-11-02 | End: 2018-11-29

## 2017-11-02 NOTE — TELEPHONE ENCOUNTER
"POD,  --I called Lesli and she says she has been taking 6.25 mg immediate reliease Coreg since August and she checked her blood pressure's for a few weeks  after starting this Coreg and they were fine.  --This needs provider to authorize this time as last order in chart is for different dose.  --Patient out of medication.  --See below for whole story line if you wish to.      Last office visit : 9/8/17.    BP Readings from Last 3 Encounters:   09/08/17 122/68   06/08/17 121/75   11/10/16 125/78     --I see last order in chart placed 8/24/17 for 20 mg dose for six month supply.  --Pharmacy is requesting   carvedilol (COREG) 6.25 MG tablet (Discontinued) 60 tablet 1 8/24/2017 8/24/2017 --      Sig: Take 1 tablet (6.25 mg) by mouth 2 times daily (with meals)     --See MyChart encounter 8/23/17 for long discussion on dosing for this medication.  --Last note in 8/23/17 encounter states - \"Dr. Vargas-Please review.  Patient requesting Coreg 25 mg daily to be prescribed.  Writer pended med.\"  --I see that last order placed that day by Sam was for carvedilol (COREG CR) 20 MG 24 hr capsule.    --I called the pharmacy and they say 20 mg Cr Coreg would cost about 400 per month and be special order.  25 mg Coreg is immediate release and is not an option based on MyChart notes  In 8/23/17 encounter.              "

## 2018-04-05 NOTE — TELEPHONE ENCOUNTER
[address] [facility]  [address] [facility] [address], [address] [facility]  [phone]      REPORT OF OPERATION  - Monofocal Implant w/ Cataract Surgery    Patient: Reinaldo Junior  :     1949  MRN:     [id number]    Called and LVM for pt to give us a call back at earliest convenience pt needs to schedule a diabetes visit.      Thanks    Ethel Rodrigues MA     

## 2018-07-19 DIAGNOSIS — E78.5 HYPERLIPIDEMIA LDL GOAL <130: ICD-10-CM

## 2018-07-19 NOTE — TELEPHONE ENCOUNTER
"Requested Prescriptions   Pending Prescriptions Disp Refills     lovastatin (MEVACOR) 20 MG tablet [Pharmacy Med Name: LOVASTATIN 20MG TABLETS]  Last Written Prescription Date:  7/31/17  Last Fill Quantity: 45 TABLET,  # refills: 3   Last office visit: 9/8/2017 with prescribing provider:  GAEL   Future Office Visit:     45 tablet 0     Sig: TAKE 1/2 TABLET BY MOUTH EVERY NIGHT AT BEDTIME    Statins Protocol Passed    7/19/2018  3:51 AM       Passed - LDL on file in past 12 months    Recent Labs   Lab Test  09/08/17   1026   LDL  75            Passed - No abnormal creatine kinase in past 12 months    No lab results found.            Passed - Recent (12 mo) or future (30 days) visit within the authorizing provider's specialty    Patient had office visit in the last 12 months or has a visit in the next 30 days with authorizing provider or within the authorizing provider's specialty.  See \"Patient Info\" tab in inbasket, or \"Choose Columns\" in Meds & Orders section of the refill encounter.           Passed - Patient is age 18 or older       Passed - No active pregnancy on record       Passed - No positive pregnancy test in past 12 months          "

## 2018-07-24 RX ORDER — LOVASTATIN 20 MG
TABLET ORAL
Qty: 45 TABLET | Refills: 0 | Status: SHIPPED | OUTPATIENT
Start: 2018-07-24 | End: 2018-10-19

## 2018-10-16 DIAGNOSIS — I10 ESSENTIAL HYPERTENSION WITH GOAL BLOOD PRESSURE LESS THAN 140/90: ICD-10-CM

## 2018-10-17 NOTE — TELEPHONE ENCOUNTER
"Requested Prescriptions   Pending Prescriptions Disp Refills     lisinopril (PRINIVIL/ZESTRIL) 40 MG tablet [Pharmacy Med Name: LISINOPRIL 40MG TABLETS]  Last Written Prescription Date:  6/26/2018  Last Fill Quantity: 135 tablet,  # refills: 0   Last Office Visit: 9/8/2017   Future Office Visit:      135 tablet 0     Sig: TAKE 1 AND 1/2 TABLETS(60 MG) BY MOUTH DAILY    ACE Inhibitors (Including Combos) Protocol Failed    10/16/2018  8:35 PM       Failed - Blood pressure under 140/90 in past 12 months    BP Readings from Last 3 Encounters:   09/08/17 122/68   06/08/17 121/75   11/10/16 125/78          Failed - Recent (12 mo) or future (30 days) visit within the authorizing provider's specialty    Patient had office visit in the last 12 months or has a visit in the next 30 days with authorizing provider or within the authorizing provider's specialty.  See \"Patient Info\" tab in inbasket, or \"Choose Columns\" in Meds & Orders section of the refill encounter.           Failed - Normal serum creatinine on file in past 12 months    Recent Labs   Lab Test  09/08/17   1026   CR  0.70          Failed - Normal serum potassium on file in past 12 months    Recent Labs   Lab Test  09/08/17   1026   POTASSIUM  3.8          Passed - Patient is age 18 or older       Passed - No active pregnancy on record       Passed - No positive pregnancy test in past 12 months          "

## 2018-10-19 DIAGNOSIS — E78.5 HYPERLIPIDEMIA LDL GOAL <130: ICD-10-CM

## 2018-10-19 RX ORDER — LISINOPRIL 40 MG/1
TABLET ORAL
Qty: 45 TABLET | Refills: 0 | Status: SHIPPED | OUTPATIENT
Start: 2018-10-19 | End: 2018-11-15

## 2018-10-19 NOTE — TELEPHONE ENCOUNTER
Routing refill request to provider for review/approval because:  Labs not current:  Last creatinine/potassium was 9/8/2017  Patient needs to be seen because it has been more than 1 year since last office visit. Last visit 9/8/2017    Reception: please call and schedule yearly visit .  thanks

## 2018-10-19 NOTE — TELEPHONE ENCOUNTER
"Requested Prescriptions   Pending Prescriptions Disp Refills     lovastatin (MEVACOR) 20 MG tablet [Pharmacy Med Name: LOVASTATIN 20MG TABLETS]  Last Written Prescription Date:  7/24/18  Last Fill Quantity: 45 TABLET,  # refills: 0   Last office visit: 9/8/17 with prescribing provider:  GAEL   Future Office Visit:     45 tablet 0     Sig: TAKE 1/2 TABLET BY MOUTH EVERY NIGHT AT BEDTIME    Statins Protocol Failed    10/19/2018 12:23 PM       Failed - LDL on file in past 12 months    Recent Labs   Lab Test  09/08/17   1026   LDL  75            Failed - Recent (12 mo) or future (30 days) visit within the authorizing provider's specialty    Patient had office visit in the last 12 months or has a visit in the next 30 days with authorizing provider or within the authorizing provider's specialty.  See \"Patient Info\" tab in inbasket, or \"Choose Columns\" in Meds & Orders section of the refill encounter.             Passed - No abnormal creatine kinase in past 12 months    No lab results found.            Passed - Patient is age 18 or older       Passed - No active pregnancy on record       Passed - No positive pregnancy test in past 12 months          "

## 2018-10-22 RX ORDER — LOVASTATIN 20 MG
TABLET ORAL
Qty: 15 TABLET | Refills: 0 | Status: SHIPPED | OUTPATIENT
Start: 2018-10-22 | End: 2018-11-16

## 2018-10-22 NOTE — TELEPHONE ENCOUNTER
Medication is being filled for 1 time refill only due to:  Patient needs to be seen because it has been more than one year since last visit.     Signed Prescriptions:                        Disp   Refills    lovastatin (MEVACOR) 20 MG tablet          15 tab*0        Sig: TAKE 1/2 TABLET BY MOUTH EVERY NIGHT AT BEDTIME  Authorizing Provider: SAUNDRA MAYO  Ordering User: DEBI DOUGLAS      Routing to  Reception - one month - due for annual office visit with fasting labs    Thank you,  Debi Douglas RN

## 2018-11-15 DIAGNOSIS — I10 ESSENTIAL HYPERTENSION WITH GOAL BLOOD PRESSURE LESS THAN 140/90: ICD-10-CM

## 2018-11-15 NOTE — TELEPHONE ENCOUNTER
"Requested Prescriptions   Pending Prescriptions Disp Refills     lisinopril (PRINIVIL/ZESTRIL) 40 MG tablet [Pharmacy Med Name: LISINOPRIL 40MG TABLETS]  Last Written Prescription Date:  10/19/2018  Last Fill Quantity: 45 tablet,  # refills: 0   Last Office Visit: 9/8/2017   Future Office Visit:    Next 5 appointments (look out 90 days)     Nov 16, 2018  8:20 AM CST   PHYSICAL with Gretchen Vargas MD   [facility]    [address] [place] [id number] [phone]                45 tablet 0     Sig: TAKE 1 AND 1/2 TABLETS BY MOUTH EVERY DAY    ACE Inhibitors (Including Combos) Protocol Failed    11/15/2018  3:36 AM       Failed - Blood pressure under 140/90 in past 12 months    BP Readings from Last 3 Encounters:   09/08/17 122/68   06/08/17 121/75   11/10/16 125/78          Failed - Normal serum creatinine on file in past 12 months    Recent Labs   Lab Test  09/08/17   1026   CR  0.70          Failed - Normal serum potassium on file in past 12 months    Recent Labs   Lab Test  09/08/17   1026   POTASSIUM  3.8          Passed - Recent (12 mo) or future (30 days) visit within the authorizing provider's specialty    Patient had office visit in the last 12 months or has a visit in the next 30 days with authorizing provider or within the authorizing provider's specialty.  See \"Patient Info\" tab in inbasket, or \"Choose Columns\" in Meds & Orders section of the refill encounter.           Passed - Patient is age 18 or older       Passed - No active pregnancy on record       Passed - No positive pregnancy test in past 12 months          "

## 2018-11-16 ENCOUNTER — OFFICE VISIT (OUTPATIENT)
Dept: FAMILY MEDICINE | Facility: CLINIC | Age: 58
End: 2018-11-16
Payer: COMMERCIAL

## 2018-11-16 VITALS
DIASTOLIC BLOOD PRESSURE: 78 MMHG | RESPIRATION RATE: 16 BRPM | TEMPERATURE: 97.8 F | HEART RATE: 68 BPM | OXYGEN SATURATION: 93 % | SYSTOLIC BLOOD PRESSURE: 120 MMHG | HEIGHT: 71 IN | BODY MASS INDEX: 35.7 KG/M2 | WEIGHT: 255 LBS

## 2018-11-16 DIAGNOSIS — Z13.89 SCREENING FOR DIABETIC PERIPHERAL NEUROPATHY: ICD-10-CM

## 2018-11-16 DIAGNOSIS — E66.01 SEVERE OBESITY (BMI 35.0-35.9 WITH COMORBIDITY) (H): ICD-10-CM

## 2018-11-16 DIAGNOSIS — E66.01 MORBID OBESITY DUE TO EXCESS CALORIES (H): ICD-10-CM

## 2018-11-16 DIAGNOSIS — E78.5 HYPERLIPIDEMIA LDL GOAL <130: ICD-10-CM

## 2018-11-16 DIAGNOSIS — I10 ESSENTIAL HYPERTENSION WITH GOAL BLOOD PRESSURE LESS THAN 140/90: ICD-10-CM

## 2018-11-16 DIAGNOSIS — Z23 NEED FOR PROPHYLACTIC VACCINATION AND INOCULATION AGAINST INFLUENZA: ICD-10-CM

## 2018-11-16 DIAGNOSIS — Z12.31 VISIT FOR SCREENING MAMMOGRAM: ICD-10-CM

## 2018-11-16 DIAGNOSIS — B35.4 TINEA CORPORIS: ICD-10-CM

## 2018-11-16 DIAGNOSIS — E11.65 TYPE 2 DIABETES MELLITUS WITH HYPERGLYCEMIA, WITHOUT LONG-TERM CURRENT USE OF INSULIN (H): ICD-10-CM

## 2018-11-16 DIAGNOSIS — N93.9 ABNORMAL VAGINAL BLEEDING: ICD-10-CM

## 2018-11-16 DIAGNOSIS — Z00.00 WELL WOMAN EXAM (NO GYNECOLOGICAL EXAM): Primary | ICD-10-CM

## 2018-11-16 LAB
ALBUMIN SERPL-MCNC: 3.6 G/DL (ref 3.4–5)
ALP SERPL-CCNC: 85 U/L (ref 40–150)
ALT SERPL W P-5'-P-CCNC: 27 U/L (ref 0–50)
ANION GAP SERPL CALCULATED.3IONS-SCNC: 6 MMOL/L (ref 3–14)
AST SERPL W P-5'-P-CCNC: 28 U/L (ref 0–45)
BILIRUB SERPL-MCNC: 0.6 MG/DL (ref 0.2–1.3)
BUN SERPL-MCNC: 19 MG/DL (ref 7–30)
CALCIUM SERPL-MCNC: 10 MG/DL (ref 8.5–10.1)
CHLORIDE SERPL-SCNC: 97 MMOL/L (ref 94–109)
CHOLEST SERPL-MCNC: 165 MG/DL
CO2 SERPL-SCNC: 32 MMOL/L (ref 20–32)
CREAT SERPL-MCNC: 0.7 MG/DL (ref 0.52–1.04)
CREAT UR-MCNC: 199 MG/DL
GFR SERPL CREATININE-BSD FRML MDRD: 86 ML/MIN/1.7M2
GLUCOSE SERPL-MCNC: 171 MG/DL (ref 70–99)
HBA1C MFR BLD: 7.7 % (ref 0–5.6)
HDLC SERPL-MCNC: 39 MG/DL
LDLC SERPL CALC-MCNC: 65 MG/DL
MICROALBUMIN UR-MCNC: 13 MG/L
MICROALBUMIN/CREAT UR: 6.53 MG/G CR (ref 0–25)
NONHDLC SERPL-MCNC: 126 MG/DL
POTASSIUM SERPL-SCNC: 3.4 MMOL/L (ref 3.4–5.3)
PROT SERPL-MCNC: 8.4 G/DL (ref 6.8–8.8)
SODIUM SERPL-SCNC: 135 MMOL/L (ref 133–144)
TRIGL SERPL-MCNC: 306 MG/DL
TSH SERPL DL<=0.005 MIU/L-ACNC: 2.97 MU/L (ref 0.4–4)

## 2018-11-16 PROCEDURE — 84443 ASSAY THYROID STIM HORMONE: CPT | Performed by: FAMILY MEDICINE

## 2018-11-16 PROCEDURE — 99213 OFFICE O/P EST LOW 20 MIN: CPT | Mod: 25 | Performed by: FAMILY MEDICINE

## 2018-11-16 PROCEDURE — 83036 HEMOGLOBIN GLYCOSYLATED A1C: CPT | Performed by: FAMILY MEDICINE

## 2018-11-16 PROCEDURE — 99396 PREV VISIT EST AGE 40-64: CPT | Performed by: FAMILY MEDICINE

## 2018-11-16 PROCEDURE — 80061 LIPID PANEL: CPT | Performed by: FAMILY MEDICINE

## 2018-11-16 PROCEDURE — 99207 C FOOT EXAM  NO CHARGE: CPT | Mod: 25 | Performed by: FAMILY MEDICINE

## 2018-11-16 PROCEDURE — 36415 COLL VENOUS BLD VENIPUNCTURE: CPT | Performed by: FAMILY MEDICINE

## 2018-11-16 PROCEDURE — 82043 UR ALBUMIN QUANTITATIVE: CPT | Performed by: FAMILY MEDICINE

## 2018-11-16 PROCEDURE — 80053 COMPREHEN METABOLIC PANEL: CPT | Performed by: FAMILY MEDICINE

## 2018-11-16 RX ORDER — CLOTRIMAZOLE 1 %
CREAM (GRAM) TOPICAL
Qty: 15 G | Refills: 3 | Status: SHIPPED | OUTPATIENT
Start: 2018-11-16 | End: 2023-05-04

## 2018-11-16 RX ORDER — HYDROCHLOROTHIAZIDE 50 MG/1
25 TABLET ORAL DAILY
Qty: 45 TABLET | Refills: 3 | Status: SHIPPED | OUTPATIENT
Start: 2018-11-16 | End: 2019-11-06

## 2018-11-16 RX ORDER — LOVASTATIN 20 MG
TABLET ORAL
Qty: 45 TABLET | Refills: 3 | Status: SHIPPED | OUTPATIENT
Start: 2018-11-16 | End: 2019-09-03

## 2018-11-16 ASSESSMENT — ENCOUNTER SYMPTOMS
DIZZINESS: 0
ABDOMINAL PAIN: 0
HEMATOCHEZIA: 0
CHILLS: 0
EYE PAIN: 0
CONSTIPATION: 0
HEMATURIA: 0
DIARRHEA: 0
COUGH: 0

## 2018-11-16 NOTE — MR AVS SNAPSHOT
After Visit Summary   11/16/2018    Lesli Jackson    MRN: [id number]           Patient Information     Date Of Birth          1960        Visit Information        Provider Department      11/16/2018 8:20 AM Gretchen Vargas MD [facility]        Today's Diagnoses     Well woman exam (no gynecological exam)    -  1    Abnormal vaginal bleeding        Type 2 diabetes mellitus with hyperglycemia, without long-term current use of insulin (H)        Hyperlipidemia LDL goal <130        Essential hypertension with goal blood pressure less than 140/90        Tinea corporis        Visit for screening mammogram        Screening for diabetic peripheral neuropathy        Need for prophylactic vaccination and inoculation against influenza          Care Instructions      Preventive Health Recommendations  Female Ages 50 - 64    Yearly exam: See your health care provider every year in order to  o Review health changes.   o Discuss preventive care.    o Review your medicines if your doctor has prescribed any.      Get a Pap test every three years (unless you have an abnormal result and your provider advises testing more often).    If you get Pap tests with HPV test, you only need to test every 5 years, unless you have an abnormal result.     You do not need a Pap test if your uterus was removed (hysterectomy) and you have not had cancer.    You should be tested each year for STDs (sexually transmitted diseases) if you're at risk.   You are due for a mammogram.   You can call any of the centers below to schedule your mammogram:  1.  Mammography [facility]  Appointment line [phone]  2   [facility]   Appointment line [phone]   3.   [facility]   Appointment line [phone]        Have a colonoscopy at age 50, or have a yearly FIT test (stool test). These exams screen for colon cancer.      Have a cholesterol test every 5 years, or more  often if advised.    Have a diabetes test (fasting glucose) every three years. If you are at risk for diabetes, you should have this test more often.     If you are at risk for osteoporosis (brittle bone disease), think about having a bone density scan (DEXA).    Shots: Get a flu shot each year. Get a tetanus shot every 10 years.    Nutrition:     Eat at least 5 servings of fruits and vegetables each day.    Eat whole-grain bread, whole-wheat pasta and brown rice instead of white grains and rice.    Get adequate Calcium and Vitamin D.     Lifestyle    Exercise at least 150 minutes a week (30 minutes a day, 5 days a week). This will help you control your weight and prevent disease.    Limit alcohol to one drink per day.    No smoking.     Wear sunscreen to prevent skin cancer.     See your dentist every six months for an exam and cleaning.    See your eye doctor every 1 to 2 years.            Follow-ups after your visit        Additional Services     OB/GYN REFERRAL       Your provider has referred you to:  [facility]: [facility] [phone]   [email or web address]    Please be aware that coverage of these services is subject to the terms and limitations of your health insurance plan.  Call member services at your health plan with any benefit or coverage questions.      Please bring the following with you to your appointment:    (1) Any X-Rays, CTs or MRIs which have been performed.  Contact the facility where they were done to arrange for  prior to your scheduled appointment.   (2) List of current medications   (3) This referral request   (4) Any documents/labs given to you for this referral                  Follow-up notes from your care team     Return in about 6 months (around 5/16/2019) for Diabetes follow up.      Future tests that were ordered for you today     Open Future Orders        Priority Expected Expires Ordered    MA SCREENING DIGITAL BILAT - Future  " (s+30) Routine  11/16/2019 11/16/2018            Who to contact     If you have questions or need follow up information about today's clinic visit or your schedule please contact [facility] directly at [phone].  Normal or non-critical lab and imaging results will be communicated to you by MyChart, letter or phone within 4 business days after the clinic has received the results. If you do not hear from us within 7 days, please contact the clinic through [employer] or phone. If you have a critical or abnormal lab result, we will notify you by phone as soon as possible.  Submit refill requests through [employer] or call your pharmacy and they will forward the refill request to us. Please allow 3 business days for your refill to be completed.          Additional Information About Your Visit        [employer] Information     [employer] gives you secure access to your electronic health record. If you see a primary care provider, you can also send messages to your care team and make appointments. If you have questions, please call your primary care clinic.  If you do not have a primary care provider, please call [phone] and they will assist you.        Care EveryWhere ID     This is your Care EveryWhere ID. This could be used by other organizations to access your [facility] medical records  [id number]        Your Vitals Were     Pulse Temperature Respirations Height Pulse Oximetry BMI (Body Mass Index)    68 97.8  F (36.6  C) (Oral) 16 5' 10.75\" (1.797 m) 93% 35.82 kg/m2       Blood Pressure from Last 3 Encounters:   11/16/18 120/78   09/08/17 122/68   06/08/17 121/75    Weight from Last 3 Encounters:   11/16/18 255 lb (115.7 kg)   09/08/17 257 lb (116.6 kg)   06/08/17 258 lb (117 kg)              We Performed the Following     Albumin Random Urine Quantitative with Creat Ratio     Comprehensive metabolic panel     FOOT EXAM  NO CHARGE [07292.114]     HEMOGLOBIN A1C     Lipid panel reflex to direct LDL " Fasting     OB/GYN REFERRAL     TSH WITH FREE T4 REFLEX          Today's Medication Changes          These changes are accurate as of 11/16/18  9:16 AM.  If you have any questions, ask your nurse or doctor.               Start taking these medicines.        Dose/Directions    clotrimazole 1 % cream   Commonly known as:  LOTRIMIN   Used for:  Tinea corporis   Started by:  Gretchen Vargas MD        Apply 1 ml to area twice daily   Quantity:  15 g   Refills:  3         These medicines have changed or have updated prescriptions.        Dose/Directions    hydrochlorothiazide 50 MG tablet   Commonly known as:  HYDRODIURIL   This may have changed:  See the new instructions.   Used for:  Essential hypertension with goal blood pressure less than 140/90   Changed by:  Gretchen Vargas MD        Dose:  25 mg   Take 0.5 tablets (25 mg) by mouth daily   Quantity:  45 tablet   Refills:  3            Where to get your medicines      These medications were sent to [facility] [address] [place] [address] AT [address] [address]     Phone:  [phone]     clotrimazole 1 % cream    hydrochlorothiazide 50 MG tablet    lovastatin 20 MG tablet                Primary Care Provider Office Phone # Fax #    Gretchen Vargas [phone] [address] [facility] [address]        Equal Access to Services     ZULEYMA SEARS AH: Hadii bk chand hadasho Soserjio, waaxda luqadaha, qaybta kaalmada adeabhida, jena tidwell. So [facility] [phone].    ATENCIÓN: Si habla español, tiene a chiu disposición servicios gratuitos de asistencia lingüística. Llame al [phone].    We comply with applicable federal civil rights laws and [place] laws. We do not discriminate on the basis of race, color, national origin, age, disability, sex, sexual orientation, or gender identity.            Thank you!     Thank  you for choosing [facility]  for your care. Our goal is always to provide you with excellent care. Hearing back from our patients is one way we can continue to improve our services. Please take a few minutes to complete the written survey that you may receive in the mail after your visit with us. Thank you!             Your Updated Medication List - Protect others around you: Learn how to safely use, store and throw away your medicines at www.disposemymeds.org.          This list is accurate as of 11/16/18  9:16 AM.  Always use your most recent med list.                   Brand Name Dispense Instructions for use Diagnosis    blood glucose monitoring lancets           carvedilol 6.25 MG tablet    COREG    180 tablet    TAKE 1 TABLET(6.25 MG) BY MOUTH TWICE DAILY WITH MEALS    Essential hypertension with goal blood pressure less than 140/90       clotrimazole 1 % cream    LOTRIMIN    15 g    Apply 1 ml to area twice daily    Tinea corporis       flaxseed oil 1000 MG Caps     90 capsule    Take 2 capsules by mouth daily.    Tired       hydrochlorothiazide 50 MG tablet    HYDRODIURIL    45 tablet    Take 0.5 tablets (25 mg) by mouth daily    Essential hypertension with goal blood pressure less than 140/90       lisinopril 40 MG tablet    PRINIVIL/ZESTRIL    45 tablet    TAKE 1 AND 1/2 TABLETS(60 MG) BY MOUTH DAILY    Essential hypertension with goal blood pressure less than 140/90       lovastatin 20 MG tablet    MEVACOR    45 tablet    TAKE 1/2 TABLET BY MOUTH EVERY NIGHT AT BEDTIME    Hyperlipidemia LDL goal <130       MULTIPLE VIT-MIN-CALCIUM-FA PO      Take  by mouth.

## 2018-11-16 NOTE — PATIENT INSTRUCTIONS
Preventive Health Recommendations  Female Ages 50 - 64    Yearly exam: See your health care provider every year in order to  o Review health changes.   o Discuss preventive care.    o Review your medicines if your doctor has prescribed any.      Get a Pap test every three years (unless you have an abnormal result and your provider advises testing more often).    If you get Pap tests with HPV test, you only need to test every 5 years, unless you have an abnormal result.     You do not need a Pap test if your uterus was removed (hysterectomy) and you have not had cancer.    You should be tested each year for STDs (sexually transmitted diseases) if you're at risk.   You are due for a mammogram.   You can call any of the centers below to schedule your mammogram:  1.  [facility]ography [facility]  Appointment line [phone]  2   [facility]   Appointment line [phone]   3.   [facility]   Appointment line [phone]        Have a colonoscopy at age 50, or have a yearly FIT test (stool test). These exams screen for colon cancer.      Have a cholesterol test every 5 years, or more often if advised.    Have a diabetes test (fasting glucose) every three years. If you are at risk for diabetes, you should have this test more often.     If you are at risk for osteoporosis (brittle bone disease), think about having a bone density scan (DEXA).    Shots: Get a flu shot each year. Get a tetanus shot every 10 years.    Nutrition:     Eat at least 5 servings of fruits and vegetables each day.    Eat whole-grain bread, whole-wheat pasta and brown rice instead of white grains and rice.    Get adequate Calcium and Vitamin D.     Lifestyle    Exercise at least 150 minutes a week (30 minutes a day, 5 days a week). This will help you control your weight and prevent disease.    Limit alcohol to one drink per day.    No smoking.     Wear sunscreen to prevent skin cancer.     See your dentist  every six months for an exam and cleaning.    See your eye doctor every 1 to 2 years.

## 2018-11-16 NOTE — PROGRESS NOTES
"   SUBJECTIVE:   CC: Lesli Jackson is an 58 year old woman who presents for preventive health visit.     Physical   Annual:     Getting at least 3 servings of Calcium per day:  Yes    Bi-annual eye exam:  Yes    Dental care twice a year:  NO    Sleep apnea or symptoms of sleep apnea:  None    Diet:  Vegetarian/vegan    Frequency of exercise:  2-3 days/week    Duration of exercise:  15-30 minutes    Taking medications regularly:  Yes    Medication side effects:  Not applicable    Additional concerns today:  No  Hypertension     Diet:  Vegetarian/vegan  Frequency of exercise:  2-3 days/week  Duration of exercise:  15-30 minutes  Taking medications regularly:  Yes  Medication side effects:  Not applicable  Additional concerns today:  No  Diabetes     Diet:  Vegetarian/vegan  Frequency of exercise:  2-3 days/week  Duration of exercise:  15-30 minutes  Taking medications regularly:  Yes  Medication side effects:  Not applicable  Additional concerns today:  No    Diabetes Follow-up      Patient is checking blood sugars: not at all - patient never started using the lancets and patient did not want to answer the diabetes questions    Diabetic concerns: none     Symptoms of hypoglycemia (low blood sugar): none     Paresthesias (numbness or burning in feet) or sores: No     Date of last diabetic eye exam: about one year    Diabetes Management Resources    Hyperlipidemia Follow-Up      Rate your low fat/cholesterol diet?: \"I don't eat it too much\"    She's been eating oatmeal every day, wants to know if this has helped lipids    Taking statin?  Yes, no muscle aches from statin    Other lipid medications/supplements?:  Flax seed  LDL Cholesterol Calculated   Date Value Ref Range Status   09/08/2017 75 <100 mg/dL Final     Comment:     Desirable:       <100 mg/dl          Hypertension Follow-up      Outpatient blood pressures are being checked at home.  Results are \"low\" associated with \"wierd floaty feeling\"    Low Salt Diet: low " salt    BP Readings from Last 2 Encounters:   11/16/18 120/78   09/08/17 122/68     Hemoglobin A1C (%)   Date Value   09/08/2017 7.7 (H)   06/08/2017 8.2 (H)     LDL Cholesterol Calculated (mg/dL)   Date Value   09/08/2017 75   06/08/2017 76       Amount of exercise or physical activity: 2-3 days/week for an average of 15-30 minutes    Problems taking medications regularly: No    Medication side effects: Carvedilol - does not feel well    Diet: low salt and low fat/cholesterol    Abnormal vaginal bleeding  She's had intermittent vaginal spotting associated with some symptoms that she used to have with her period (feeling as if she's coming down with a cold), first noted about 2 years ago resolved, but has had some reoccurrence over the past few months of very light vaginal spotting.      Rash  One small patch under left breast, slightly itchy, similar to ring worm rash before that had responded to topical ointment.    Today's PHQ-2 Score:   PHQ-2 ( 1999 Pf[employer]) 11/16/2018   Q1: Little interest or pleasure in doing things 0   Q2: Feeling down, depressed or hopeless 0   PHQ-2 Score 0   Q1: Little interest or pleasure in doing things Not at all   Q2: Feeling down, depressed or hopeless Not at all   PHQ-2 Score 0     Abuse: Current or Past(Physical, Sexual or Emotional)- No  Do you feel safe in your environment - Yes    Social History   Substance Use Topics     Smoking status: Former Smoker     Years: 20.00     Smokeless tobacco: Former User      Comment: 5 cigarettes day or less     Alcohol use Yes      Comment: 1-2  beer daily on average     Alcohol Use 11/16/2018   If you drink alcohol do you typically have greater than 3 drinks per day OR greater than 7 drinks per week? No     Reviewed orders with patient.  Reviewed health maintenance and updated orders accordingly - Yes  BP Readings from Last 3 Encounters:   11/16/18 120/78   09/08/17 122/68   06/08/17 121/75    Wt Readings from Last 3 Encounters:   11/16/18 255 lb  (115.7 kg)   09/08/17 257 lb (116.6 kg)   06/08/17 258 lb (117 kg)                  Patient Active Problem List   Diagnosis     Other specified idiopathic peripheral neuropathy     HYPERLIPIDEMIA LDL GOAL <130     Idiopathic edema     Nevus     Benign neoplasm of skin of trunk, except scrotum     Skin tag     Essential hypertension with goal blood pressure less than 140/90     Swelling of limb     Localized swelling, mass and lump, neck     Tinea corporis     Morbid obesity due to excess calories (H)     Abnormal vaginal bleeding     Type 2 diabetes mellitus with hyperglycemia, without long-term current use of insulin (H)     High blood triglycerides     Advance care planning     Severe obesity (BMI 35.0-35.9 with comorbidity) (H)     Past Surgical History:   Procedure Laterality Date     COLONOSCOPY  2/16/11    repeat 3 yrs, one inconclusive area       Social History   Substance Use Topics     Smoking status: Former Smoker     Years: 20.00     Smokeless tobacco: Former User      Comment: 5 cigarettes day or less     Alcohol use Yes      Comment: 1-2  beer daily on average     Family History   Problem Relation Age of Onset     C.A.D. Father      Diabetes Father      Cerebrovascular Disease Father      Eye Disorder Father      cataracts     HEART DISEASE Father      Hypertension Mother      Arthritis Mother      Cancer Mother      lymphoma     Prostate Cancer Maternal Uncle      Allergies Sister      Eye Disorder Other      iritis     Lipids Other      one of her parents     Thyroid Disease Sister          Current Outpatient Prescriptions   Medication Sig Dispense Refill     carvedilol (COREG) 6.25 MG tablet TAKE 1 TABLET(6.25 MG) BY MOUTH TWICE DAILY WITH MEALS 180 tablet 3     clotrimazole (LOTRIMIN) 1 % cream Apply 1 ml to area twice daily 15 g 3     Flaxseed, Linseed, (FLAXSEED OIL) 1000 MG CAPS Take 2 capsules by mouth daily. 90 capsule 0     hydrochlorothiazide (HYDRODIURIL) 50 MG tablet Take 0.5 tablets (25  mg) by mouth daily 45 tablet 3     lisinopril (PRINIVIL/ZESTRIL) 40 MG tablet TAKE 1 AND 1/2 TABLETS(60 MG) BY MOUTH DAILY 45 tablet 0     lovastatin (MEVACOR) 20 MG tablet TAKE 1/2 TABLET BY MOUTH EVERY NIGHT AT BEDTIME 45 tablet 3     MULTIPLE VIT-MIN-CALCIUM-FA PO Take  by mouth.       blood glucose monitoring (ACCU-CHEK FASTCLIX) lancets        [DISCONTINUED] hydrochlorothiazide (HYDRODIURIL) 50 MG tablet TAKE 1 TABLET(50 MG) BY MOUTH EVERY MORNING 90 tablet 1     [DISCONTINUED] lovastatin (MEVACOR) 20 MG tablet TAKE 1/2 TABLET BY MOUTH EVERY NIGHT AT BEDTIME 15 tablet 0     Allergies   Allergen Reactions     Amoxicillin Rash     Recent Labs   Lab Test  09/08/17   1026  06/08/17   0827  11/10/16   1023  07/08/16   0957   A1C  7.7*  8.2*  8.5*  8.7*   LDL  75  76   --   85   HDL  39*  38*   --   35*   TRIG  302*  240*   --   258*   ALT  26  29   --   35   CR  0.70  0.61   --   0.74   GFRESTIMATED  86  >90  Non  GFR Calc     --   81   GFRESTBLACK  >90  >90  African American GFR Calc     --   >90   GFR Calc     POTASSIUM  3.8  3.5   --   3.6   TSH   --    --    --   3.16        Patient over age 50, mutual decision to screen reflected in health maintenance.    Pertinent mammograms are reviewed under the imaging tab.  History of abnormal Pap smear:   NO - age 30-65 PAP every 5 years with negative HPV co-testing recommended  Last 3 Pap and HPV Results:   PAP / HPV Latest Ref Rng & Units 7/8/2016 4/22/2013 6/15/2010   PAP - NIL NIL NIL   HPV 16 DNA NEG Negative - -   HPV 18 DNA NEG Negative - -   OTHER HR HPV NEG Negative - -     PAP / HPV Latest Ref Rng & Units 7/8/2016 4/22/2013 6/15/2010   PAP - NIL NIL NIL   HPV 16 DNA NEG Negative - -   HPV 18 DNA NEG Negative - -   OTHER HR HPV NEG Negative - -     Reviewed and updated as needed this visit by clinical staff  Tobacco  Allergies  Meds  Med Hx  Surg Hx  Fam Hx  Soc Hx      Reviewed and updated as needed this visit by  "Provider        Past Medical History:   Diagnosis Date     Baker's cyst of knee     Lt (7.3x4.3x1.6 cm)     Elevated glucose      Hyperlipidemia LDL goal <130      Hypertension goal BP (blood pressure) < 140/90      Idiopathic edema      NONSPECIFIC MEDICAL HISTORY     nephrolithiasis     Personal history of smoking     quit in      Secondary lymphedema 19    DX by [facility] [place], lose wt. at this point     Type 2 diabetes, HbA1c goal < 7% (H)      Unspecified iridocyclitis       Past Surgical History:   Procedure Laterality Date     COLONOSCOPY  11    repeat 3 yrs, one inconclusive area     Obstetric History       T0      L0     SAB0   TAB1   Ectopic0   Multiple0   Live Births0       # Outcome Date GA Lbr Bryn/2nd Weight Sex Delivery Anes PTL Lv   1 TAB                   Review of Systems   Constitutional: Negative for chills.   HENT: Negative for congestion and ear pain.    Eyes: Negative for pain.   Respiratory: Negative for cough.    Cardiovascular: Negative for chest pain.   Gastrointestinal: Negative for abdominal pain, constipation, diarrhea and hematochezia.   Genitourinary: Negative for hematuria.   Neurological: Negative for dizziness.     Family History   Problem Relation Age of Onset     C.A.D. Father      Diabetes Father      Cerebrovascular Disease Father      Eye Disorder Father      cataracts     HEART DISEASE Father      Hypertension Mother      Arthritis Mother      Cancer Mother      lymphoma     Prostate Cancer Maternal Uncle      Allergies Sister      Eye Disorder Other      iritis     Lipids Other      one of her parents     Thyroid Disease Sister          OBJECTIVE:   /78  Pulse 68  Temp 97.8  F (36.6  C) (Oral)  Resp 16  Ht 5' 10.75\" (1.797 m)  Wt 255 lb (115.7 kg)  SpO2 93%  BMI 35.82 kg/m2  Physical Exam  GENERAL APPEARANCE: healthy, alert and no distress  EYES: Eyes grossly normal to inspection, PERRL and conjunctivae and sclerae normal  HENT: ear " canals and TM's normal, nose and mouth without ulcers or lesions, oropharynx clear and oral mucous membranes moist  NECK: no adenopathy, no asymmetry, masses, or scars and thyroid normal to palpation  RESP: lungs clear to auscultation - no rales, rhonchi or wheezes  BREAST: normal without masses, tenderness or nipple discharge, no palpable axillary masses or adenopathy and small 1 cm irregular patch of erythema under left breast, skin turgor and color otherwise normal, no other rashes noted  CV: regular rate and rhythm, normal S1 S2, no S3 or S4, no murmur, click or rub, no peripheral edema and peripheral pulses strong  ABDOMEN: soft, nontender, no hepatosplenomegaly, no masses and bowel sounds normal  MS: no musculoskeletal defects are noted and gait is age appropriate without ataxia  SKIN: see breast exam above  NEURO: Normal strength and tone, sensory exam grossly normal, mentation intact and speech normal  PSYCH: mentation appears normal and affect normal/bright  FOOT: Foot exam - both sides normal; no swelling, tenderness or skin or vascular lesions. Color and temperature is normal. Sensation is intact. Peripheral pulses are palpable.   Results for orders placed or performed in visit on 09/08/17   Hemoglobin A1c   Result Value Ref Range    Hemoglobin A1C 7.7 (H) 4.3 - 6.0 %   Comprehensive metabolic panel   Result Value Ref Range    Sodium 137 133 - 144 mmol/L    Potassium 3.8 3.4 - 5.3 mmol/L    Chloride 98 94 - 109 mmol/L    Carbon Dioxide 34 (H) 20 - 32 mmol/L    Anion Gap 5 3 - 14 mmol/L    Glucose 175 (H) 70 - 99 mg/dL    Urea Nitrogen 16 7 - 30 mg/dL    Creatinine 0.70 0.52 - 1.04 mg/dL    GFR Estimate 86 >60 mL/min/1.7m2    GFR Estimate If Black >90 >60 mL/min/1.7m2    Calcium 9.6 8.5 - 10.1 mg/dL    Bilirubin Total 0.6 0.2 - 1.3 mg/dL    Albumin 3.6 3.4 - 5.0 g/dL    Protein Total 8.1 6.8 - 8.8 g/dL    Alkaline Phosphatase 85 40 - 150 U/L    ALT 26 0 - 50 U/L    AST 27 0 - 45 U/L   Albumin Random Urine  Quantitative with Creat Ratio   Result Value Ref Range    Creatinine Urine 167 mg/dL    Albumin Urine mg/L 10 mg/L    Albumin Urine mg/g Cr 6.05 0 - 25 mg/g Cr   Lipid panel reflex to direct LDL   Result Value Ref Range    Cholesterol 174 <200 mg/dL    Triglycerides 302 (H) <150 mg/dL    HDL Cholesterol 39 (L) >49 mg/dL    LDL Cholesterol Calculated 75 <100 mg/dL    Non HDL Cholesterol 135 (H) <130 mg/dL      ASSESSMENT/PLAN:   1. Well woman exam (no gynecological exam)  routine    2. Abnormal vaginal bleeding  Patient mentions that symptoms have reoccurred, initial spotting 2 years ago, had not followed up with OB at that time, I had done pelvic exam with normal PAP, although unfortunately I had not documented pelvic exam, I don't recall that patient has had cervical polyp.  New, previously undiagnosed problem with uncertain prognosis and additional work-up planned.   I recommend ob evaluation with possible endometrial biopsy within next month or so, The patient indicates understanding of these issues and agrees with the plan.    - OB/GYN REFERRAL    3. Type 2 diabetes mellitus with hyperglycemia, without long-term current use of insulin (H)  Control unknown, recent diet changes (no processed foods), recheck A1C today and Will fu as indicated.   - HEMOGLOBIN A1C  - TSH WITH FREE T4 REFLEX  - Comprehensive metabolic panel    4. Hyperlipidemia LDL goal <130  LDL Cholesterol Calculated   Date Value Ref Range Status   09/08/2017 75 <100 mg/dL Final     Comment:     Desirable:       <100 mg/dl    at goal  The current medical regimen is effective;  continue present plan and medications.   - Lipid panel reflex to direct LDL Fasting  - lovastatin (MEVACOR) 20 MG tablet; TAKE 1/2 TABLET BY MOUTH EVERY NIGHT AT BEDTIME  Dispense: 45 tablet; Refill: 3  - Comprehensive metabolic panel    5. Essential hypertension with goal blood pressure less than 140/90  BP Readings from Last 3 Encounters:   11/16/18 120/78   09/08/17 122/68  "  06/08/17 121/75    symptomatic hypotension, reduced hydrochlorothiazide today from 50 mg to 25 mg po qd  - Albumin Random Urine Quantitative with Creat Ratio  - hydrochlorothiazide (HYDRODIURIL) 50 MG tablet; Take 0.5 tablets (25 mg) by mouth daily  Dispense: 45 tablet; Refill: 3  - Comprehensive metabolic panel    6. Tinea corporis  See orders  - clotrimazole (LOTRIMIN) 1 % cream; Apply 1 ml to area twice daily  Dispense: 15 g; Refill: 3    7. Visit for screening mammogram  - MA SCREENING DIGITAL BILAT - Future  (s+30); Future    8. Screening for diabetic peripheral neuropathy  - FOOT EXAM  NO CHARGE [43191.114]    9. Need for prophylactic vaccination and inoculation against influenza  Done today      COUNSELING:  Reviewed preventive health counseling, as reflected in patient instructions    BP Readings from Last 1 Encounters:   11/16/18 120/78     Estimated body mass index is 35.82 kg/(m^2) as calculated from the following:    Height as of this encounter: 5' 10.75\" (1.797 m).    Weight as of this encounter: 255 lb (115.7 kg).      Weight management plan: Discussed healthy diet and exercise guidelines and patient will follow up in 6 months in clinic to re-evaluate.     reports that she has quit smoking. She quit after 20.00 years of use. She has quit using smokeless tobacco.      Counseling Resources:  ATP IV Guidelines  Pooled Cohorts Equation Calculator  Breast Cancer Risk Calculator  FRAX Risk Assessment  ICSI Preventive Guidelines  Dietary Guidelines for Americans, 2010  USDA's MyPlate  ASA Prophylaxis  Lung CA Screening    Gretchen Vargas MD  [facility]  Answers for HPI/ROS submitted by the patient on 11/16/2018   PHQ-2 Score: 0  Health Maintenance Due   Topic Date Due     A1C Q3 MO  12/08/2017     BMP Q6 MOS  03/08/2018     PHQ-2 Q1 YR  06/08/2018     TSH W/ FREE T4 REFLEX Q2 YEAR  07/08/2018     FOOT EXAM Q1 YEAR  09/08/2018     LIPID MONITORING Q1 YEAR  09/08/2018     MICROALBUMIN Q1 " YEAR  09/08/2018     MAMMO SCREEN Q2 YR (SYSTEM ASSIGNED)  10/11/2018

## 2018-11-19 NOTE — TELEPHONE ENCOUNTER
"Requested Prescriptions   Pending Prescriptions Disp Refills     lisinopril (PRINIVIL/ZESTRIL) 40 MG tablet [Pharmacy Med Name: LISINOPRIL 40MG TABLETS] 45 tablet 0     Sig: TAKE 1 AND 1/2 TABLETS BY MOUTH EVERY DAY    ACE Inhibitors (Including Combos) Protocol Passed    11/19/2018  9:19 AM       Passed - Blood pressure under 140/90 in past 12 months    BP Readings from Last 3 Encounters:   11/16/18 120/78   09/08/17 122/68   06/08/17 121/75                Passed - Recent (12 mo) or future (30 days) visit within the authorizing provider's specialty    Patient had office visit in the last 12 months or has a visit in the next 30 days with authorizing provider or within the authorizing provider's specialty.  See \"Patient Info\" tab in inbasket, or \"Choose Columns\" in Meds & Orders section of the refill encounter.             Passed - Patient is age 18 or older       Passed - No active pregnancy on record       Passed - Normal serum creatinine on file in past 12 months    Recent Labs   Lab Test  11/16/18   0922   CR  0.70            Passed - Normal serum potassium on file in past 12 months    Recent Labs   Lab Test  11/16/18   0922   POTASSIUM  3.4            Passed - No positive pregnancy test in past 12 months          "

## 2018-11-20 RX ORDER — LISINOPRIL 40 MG/1
TABLET ORAL
Qty: 135 TABLET | Refills: 3 | Status: SHIPPED | OUTPATIENT
Start: 2018-11-20 | End: 2019-11-06

## 2018-11-29 DIAGNOSIS — I10 ESSENTIAL HYPERTENSION WITH GOAL BLOOD PRESSURE LESS THAN 140/90: ICD-10-CM

## 2018-11-29 NOTE — PROGRESS NOTES
Hello!  It was a pleasure to see you in clinic!  Thank you for getting labs done. Everything looks about the same as last year. Your A1C is 7.7, same as last year.  It would be best if this were under 7, but under 8 is definitely a good start!    Your microalbumen is normal, which is great news. This means you do not have protein in the urine, and that your kidneys are currently functioning well. Keeping blood pressure and blood fats low is the best way to preserve your kidney function over your lifetime.  The blood test of your kidney function, liver function and electrolytes was normal.     Your thyroid function is normal, which is good news.  This means that you do not have hyperthyroidism or hypothyroidism.    Your triglycerides are still high and your HDL is still low, but your LDL cholesterol which is most associated with heart disease is excellent.  To keep triglycerides in check, reduce carbohydrates in your diet including sugars, juice, excess fruit, bread, pasta, rice and  cereal.   Exercise for at least 30 min 5 times per week, and lift weights 2-3 times per week.     If you have any questions, please contact the clinic or schedule an appointment with me, thank you!    Sincerely,  Dr. Gretchen Vargas MD  11/29/2018

## 2018-11-29 NOTE — TELEPHONE ENCOUNTER
"Requested Prescriptions   Pending Prescriptions Disp Refills     carvedilol (COREG) 6.25 MG tablet [Pharmacy Med Name: CARVEDILOL 6.25MG TABLETS]  Last Written Prescription Date:  11/2/2017  Last Fill Quantity: 180 tablet,  # refills: 3   Last Office Visit: 11/16/2018   Future Office Visit:      180 tablet 0     Sig: TAKE 1 TABLET(6.25 MG) BY MOUTH TWICE DAILY WITH MEALS    Beta-Blockers Protocol Passed    11/29/2018  8:44 AM       Passed - Blood pressure under 140/90 in past 12 months    BP Readings from Last 3 Encounters:   11/16/18 120/78   09/08/17 122/68   06/08/17 121/75          Passed - Patient is age 6 or older       Passed - Recent (12 mo) or future (30 days) visit within the authorizing provider's specialty    Patient had office visit in the last 12 months or has a visit in the next 30 days with authorizing provider or within the authorizing provider's specialty.  See \"Patient Info\" tab in inbasket, or \"Choose Columns\" in Meds & Orders section of the refill encounter.                "

## 2018-12-02 DIAGNOSIS — I10 ESSENTIAL HYPERTENSION WITH GOAL BLOOD PRESSURE LESS THAN 140/90: ICD-10-CM

## 2018-12-03 RX ORDER — CARVEDILOL 6.25 MG/1
TABLET ORAL
Qty: 180 TABLET | Refills: 3 | Status: SHIPPED | OUTPATIENT
Start: 2018-12-03 | End: 2019-11-26

## 2018-12-03 NOTE — TELEPHONE ENCOUNTER
"Requested Prescriptions   Pending Prescriptions Disp Refills     carvedilol (COREG) 6.25 MG tablet [Pharmacy Med Name: CARVEDILOL 6.25MG TABLETS]  Last Written Prescription Date:  12/3/2018  Last Fill Quantity: 180 tablet,  # refills: 3   Last Office Visit: 11/16/2018   Future Office Visit:      180 tablet 0     Sig: TAKE 1 TABLET(6.25 MG) BY MOUTH TWICE DAILY WITH MEALS    Beta-Blockers Protocol Passed    12/2/2018  1:02 PM       Passed - Blood pressure under 140/90 in past 12 months    BP Readings from Last 3 Encounters:   11/16/18 120/78   09/08/17 122/68   06/08/17 121/75          Passed - Patient is age 6 or older       Passed - Recent (12 mo) or future (30 days) visit within the authorizing provider's specialty    Patient had office visit in the last 12 months or has a visit in the next 30 days with authorizing provider or within the authorizing provider's specialty.  See \"Patient Info\" tab in inbasket, or \"Choose Columns\" in Meds & Orders section of the refill encounter.         "

## 2018-12-03 NOTE — TELEPHONE ENCOUNTER
Pt calling to check status, said she had to get a couple emergency refills over the weekend, and had seen provider recently so not sure if it is a [employer] issue or other. She uses [facility] on [address]  And would like to  asap. Please advise. Thanks

## 2018-12-04 RX ORDER — CARVEDILOL 6.25 MG/1
TABLET ORAL
Qty: 180 TABLET | Refills: 0 | OUTPATIENT
Start: 2018-12-04

## 2018-12-05 RX ORDER — CARVEDILOL 6.25 MG/1
TABLET ORAL
Qty: 60 TABLET | Refills: 0 | OUTPATIENT
Start: 2018-12-05

## 2018-12-22 DIAGNOSIS — I10 ESSENTIAL HYPERTENSION WITH GOAL BLOOD PRESSURE LESS THAN 140/90: ICD-10-CM

## 2018-12-24 NOTE — TELEPHONE ENCOUNTER
"Requested Prescriptions   Pending Prescriptions Disp Refills     hydrochlorothiazide (HYDRODIURIL) 50 MG tablet [Pharmacy Med Name: HYDROCHLOROTHIAZIDE 50MG TABLETS]  Last Written Prescription Date:  11/16/2018  Last Fill Quantity: 45 tablet,  # refills: 3   Last Office Visit: 11/16/2018   Future Office Visit:      90 tablet 0     Sig: TAKE 1 TABLET(50 MG) BY MOUTH EVERY MORNING    Diuretics (Including Combos) Protocol Passed - 12/22/2018  3:36 AM       Passed - Blood pressure under 140/90 in past 12 months    BP Readings from Last 3 Encounters:   11/16/18 120/78   09/08/17 122/68   06/08/17 121/75          Passed - Recent (12 mo) or future (30 days) visit within the authorizing provider's specialty    Patient had office visit in the last 12 months or has a visit in the next 30 days with authorizing provider or within the authorizing provider's specialty.  See \"Patient Info\" tab in inbasket, or \"Choose Columns\" in Meds & Orders section of the refill encounter.           Passed - Patient is age 18 or older       Passed - No active pregancy on record       Passed - Normal serum creatinine on file in past 12 months    Recent Labs   Lab Test 11/16/18 0922   CR 0.70           Passed - Normal serum potassium on file in past 12 months    Recent Labs   Lab Test 11/16/18 0922   POTASSIUM 3.4           Passed - Normal serum sodium on file in past 12 months    Recent Labs   Lab Test 11/16/18 0922              Passed - No positive pregnancy test in past 12 months          "

## 2018-12-27 RX ORDER — HYDROCHLOROTHIAZIDE 50 MG/1
TABLET ORAL
Qty: 90 TABLET | Refills: 0 | OUTPATIENT
Start: 2018-12-27

## 2018-12-27 NOTE — TELEPHONE ENCOUNTER
Pharmacy requesting incorrect dose.  Msg sent to pharmacy - Incorrect Dose (SEE ORDER SENT 11/16/18.)

## 2019-08-08 DIAGNOSIS — E78.5 HYPERLIPIDEMIA LDL GOAL <130: ICD-10-CM

## 2019-08-08 RX ORDER — LOVASTATIN 20 MG
TABLET ORAL
Qty: 0.1 TABLET | Refills: 0 | OUTPATIENT
Start: 2019-08-08

## 2019-08-08 NOTE — TELEPHONE ENCOUNTER
"Requested Prescriptions   Pending Prescriptions Disp Refills     lovastatin (MEVACOR) 20 MG tablet [Pharmacy Med Name: LOVASTATIN 20MG TABLETS] 45 tablet 0     Sig: TAKE 1/2 TABLET BY MOUTH EVERY NIGHT AT BEDTIME  Last Written Prescription Date:  11/16/2018  Last Fill Quantity: 45 tablet,  # refills: 3   Last Office Visit: 11/16/2018   Future Office Visit:            Statins Protocol Passed - 8/8/2019  5:07 AM        Passed - LDL on file in past 12 months     Recent Labs   Lab Test 11/16/18  0922   LDL 65           Passed - No abnormal creatine kinase in past 12 months     No lab results found.           Passed - Recent (12 mo) or future (30 days) visit within the authorizing provider's specialty     Patient had office visit in the last 12 months or has a visit in the next 30 days with authorizing provider or within the authorizing provider's specialty.  See \"Patient Info\" tab in inbasket, or \"Choose Columns\" in Meds & Orders section of the refill encounter.            Passed - Medication is active on med list        Passed - Patient is age 18 or older        Passed - No active pregnancy on record        Passed - No positive pregnancy test in past 12 months          "

## 2019-08-08 NOTE — TELEPHONE ENCOUNTER
Refused Prescriptions:                       Disp   Refills    lovastatin (MEVACOR) 20 MG tablet [Pharmac*0.1 ta*0        Sig: TAKE 1/2 TABLET BY MOUTH EVERY NIGHT AT BEDTIME  Refused By: DEBI DOUGLAS  Reason for Refusal: Duplicate

## 2019-09-30 ENCOUNTER — HEALTH MAINTENANCE LETTER (OUTPATIENT)
Age: 59
End: 2019-09-30

## 2019-11-06 DIAGNOSIS — I10 ESSENTIAL HYPERTENSION WITH GOAL BLOOD PRESSURE LESS THAN 140/90: ICD-10-CM

## 2019-11-06 DIAGNOSIS — E78.5 HYPERLIPIDEMIA LDL GOAL <130: ICD-10-CM

## 2019-11-06 NOTE — TELEPHONE ENCOUNTER
"Requested Prescriptions   Pending Prescriptions Disp Refills     hydrochlorothiazide (HYDRODIURIL) 50 MG tablet [Pharmacy Med Name: HYDROCHLOROTHIAZIDE 50MG TABLETS] 45 tablet 0     Sig: TAKE 1/2 TABLET(25 MG) BY MOUTH DAILY  Last Written Prescription Date:  11/16/2018  Last Fill Quantity: 45 tablet,  # refills: 3   Last Office Visit: 11/16/2018   Future Office Visit:            Diuretics (Including Combos) Protocol Passed - 11/6/2019  5:08 AM        Passed - Blood pressure under 140/90 in past 12 months     BP Readings from Last 3 Encounters:   11/16/18 120/78   09/08/17 122/68   06/08/17 121/75           Passed - Recent (12 mo) or future (30 days) visit within the authorizing provider's specialty     Patient has had an office visit with the authorizing provider or a provider within the authorizing providers department within the previous 12 mos or has a future within next 30 days. See \"Patient Info\" tab in inbasket, or \"Choose Columns\" in Meds & Orders section of the refill encounter.            Passed - Medication is active on med list        Passed - Patient is age 18 or older        Passed - No active pregancy on record        Passed - Normal serum creatinine on file in past 12 months     Recent Labs   Lab Test 11/16/18  0922   CR 0.70            Passed - Normal serum potassium on file in past 12 months     Recent Labs   Lab Test 11/16/18  0922   POTASSIUM 3.4            Passed - Normal serum sodium on file in past 12 months     Recent Labs   Lab Test 11/16/18  0922               Passed - No positive pregnancy test in past 12 months     _________________________________________________________________       lisinopril (PRINIVIL/ZESTRIL) 40 MG tablet [Pharmacy Med Name: LISINOPRIL 40MG TABLETS] 135 tablet 0     Sig: TAKE 1 AND 1/2 TABLETS BY MOUTH EVERY DAY  Last Written Prescription Date:  11/20/2018  Last Fill Quantity: 135 tablet,  # refills: 3   Last Office Visit: 11/16/2018   Future Office Visit:      " "      ACE Inhibitors (Including Combos) Protocol Passed - 11/6/2019  5:08 AM        Passed - Blood pressure under 140/90 in past 12 months     BP Readings from Last 3 Encounters:   11/16/18 120/78   09/08/17 122/68   06/08/17 121/75           Passed - Recent (12 mo) or future (30 days) visit within the authorizing provider's specialty     Patient has had an office visit with the authorizing provider or a provider within the authorizing providers department within the previous 12 mos or has a future within next 30 days. See \"Patient Info\" tab in inbasket, or \"Choose Columns\" in Meds & Orders section of the refill encounter.            Passed - Medication is active on med list        Passed - Patient is age 18 or older        Passed - No active pregnancy on record        Passed - Normal serum creatinine on file in past 12 months     Recent Labs   Lab Test 11/16/18  0922   CR 0.70           Passed - Normal serum potassium on file in past 12 months     Recent Labs   Lab Test 11/16/18 0922   POTASSIUM 3.4           Passed - No positive pregnancy test within past 12 months     _________________________________________________________________       lovastatin (MEVACOR) 20 MG tablet [Pharmacy Med Name: LOVASTATIN 20MG TABLETS] 45 tablet 0     Sig: TAKE 1/2 TABLET BY MOUTH EVERY NIGHT AT BEDTIME  Last Written Prescription Date:  9/6/2019  Last Fill Quantity: 45 tablet,  # refills: 0   Last Office Visit: 11/16/2018   Future Office Visit:            Statins Protocol Passed - 11/6/2019  5:08 AM        Passed - LDL on file in past 12 months     Recent Labs   Lab Test 11/16/18  0922   LDL 65           Passed - No abnormal creatine kinase in past 12 months     No lab results found.           Passed - Recent (12 mo) or future (30 days) visit within the authorizing provider's specialty     Patient has had an office visit with the authorizing provider or a provider within the authorizing providers department within the previous 12 " "mos or has a future within next 30 days. See \"Patient Info\" tab in inbasket, or \"Choose Columns\" in Meds & Orders section of the refill encounter.            Passed - Medication is active on med list        Passed - Patient is age 18 or older        Passed - No active pregnancy on record        Passed - No positive pregnancy test in past 12 months          "

## 2019-11-06 NOTE — TELEPHONE ENCOUNTER
"Requested Prescriptions   Pending Prescriptions Disp Refills     carvedilol (COREG) 6.25 MG tablet [Pharmacy Med Name: CARVEDILOL 6.25MG TABLETS] 180 tablet 0     Sig: TAKE 1 TABLET BY MOUTH TWICE DAILY WITH MEALS  Last Written Prescription Date:  12/3/2018  Last Fill Quantity: 180 tablet,  # refills: 3   Last Office Visit: 11/16/2018   Future Office Visit:            Beta-Blockers Protocol Passed - 11/6/2019  5:08 AM        Passed - Blood pressure under 140/90 in past 12 months     BP Readings from Last 3 Encounters:   11/16/18 120/78   09/08/17 122/68   06/08/17 121/75           Passed - Patient is age 6 or older        Passed - Recent (12 mo) or future (30 days) visit within the authorizing provider's specialty     Patient has had an office visit with the authorizing provider or a provider within the authorizing providers department within the previous 12 mos or has a future within next 30 days. See \"Patient Info\" tab in inbasket, or \"Choose Columns\" in Meds & Orders section of the refill encounter.            Passed - Medication is active on med list          "

## 2019-11-07 RX ORDER — LOVASTATIN 20 MG
TABLET ORAL
Qty: 45 TABLET | Refills: 0 | OUTPATIENT
Start: 2019-11-07

## 2019-11-07 RX ORDER — LISINOPRIL 40 MG/1
TABLET ORAL
Qty: 135 TABLET | Refills: 1 | Status: SHIPPED | OUTPATIENT
Start: 2019-11-07 | End: 2020-03-17

## 2019-11-07 RX ORDER — HYDROCHLOROTHIAZIDE 50 MG/1
TABLET ORAL
Qty: 45 TABLET | Refills: 1 | Status: SHIPPED | OUTPATIENT
Start: 2019-11-07 | End: 2020-05-16

## 2019-11-07 RX ORDER — CARVEDILOL 6.25 MG/1
TABLET ORAL
Qty: 180 TABLET | Refills: 0 | OUTPATIENT
Start: 2019-11-07

## 2019-11-07 NOTE — TELEPHONE ENCOUNTER
,  --Please contact patient and ask her to schedule an annual office visit this month.        Message sent to pharmacy - Refusal reason: Patient has requested refill too soon (SEE ORDER SENT 12/3/18 FOR 1 YR SUPPLY. PT DUE FOR ANNUAL VISIT IN NOV).  Piero PACKER

## 2019-11-07 NOTE — TELEPHONE ENCOUNTER
Routing refill request to provider for review/approval because:  --Looks like dose change on hctz 11/16/18 and no follow up blood pressure and labs so far.  --Plan in last office visit 11/16/18 was to RTC in 6 months for diabetes visit.       ,  --Please contact patient and ask her to schedule an office visit with  as planned in last ov.    --A refill request for below medication was sent to the provider.

## 2019-11-25 ASSESSMENT — ENCOUNTER SYMPTOMS
ARTHRALGIAS: 0
ABDOMINAL PAIN: 0
DIARRHEA: 0
NAUSEA: 0
NERVOUS/ANXIOUS: 0
SORE THROAT: 0
COUGH: 0
HEMATURIA: 0
SHORTNESS OF BREATH: 0
MYALGIAS: 0
CONSTIPATION: 0
PALPITATIONS: 0
CHILLS: 0
BREAST MASS: 0
JOINT SWELLING: 0
WEAKNESS: 0
PARESTHESIAS: 0
HEMATOCHEZIA: 0
HEARTBURN: 0
HEADACHES: 0
FEVER: 0
DYSURIA: 0
EYE PAIN: 0
FREQUENCY: 0
DIZZINESS: 0

## 2019-11-26 ENCOUNTER — OFFICE VISIT (OUTPATIENT)
Dept: FAMILY MEDICINE | Facility: CLINIC | Age: 59
End: 2019-11-26
Payer: COMMERCIAL

## 2019-11-26 VITALS
SYSTOLIC BLOOD PRESSURE: 132 MMHG | DIASTOLIC BLOOD PRESSURE: 86 MMHG | RESPIRATION RATE: 16 BRPM | OXYGEN SATURATION: 95 % | TEMPERATURE: 97.9 F | BODY MASS INDEX: 34.86 KG/M2 | WEIGHT: 249 LBS | HEART RATE: 78 BPM | HEIGHT: 71 IN

## 2019-11-26 DIAGNOSIS — E78.5 HYPERLIPIDEMIA LDL GOAL <130: ICD-10-CM

## 2019-11-26 DIAGNOSIS — Z23 NEED FOR PROPHYLACTIC VACCINATION AND INOCULATION AGAINST INFLUENZA: ICD-10-CM

## 2019-11-26 DIAGNOSIS — Z00.00 ROUTINE GENERAL MEDICAL EXAMINATION AT A HEALTH CARE FACILITY: Primary | ICD-10-CM

## 2019-11-26 DIAGNOSIS — Z12.31 VISIT FOR SCREENING MAMMOGRAM: ICD-10-CM

## 2019-11-26 DIAGNOSIS — E11.65 TYPE 2 DIABETES MELLITUS WITH HYPERGLYCEMIA, WITHOUT LONG-TERM CURRENT USE OF INSULIN (H): ICD-10-CM

## 2019-11-26 DIAGNOSIS — E78.1 HIGH BLOOD TRIGLYCERIDES: ICD-10-CM

## 2019-11-26 DIAGNOSIS — I10 ESSENTIAL HYPERTENSION WITH GOAL BLOOD PRESSURE LESS THAN 140/90: ICD-10-CM

## 2019-11-26 LAB
ALBUMIN SERPL-MCNC: 3.3 G/DL (ref 3.4–5)
ALP SERPL-CCNC: 84 U/L (ref 40–150)
ALT SERPL W P-5'-P-CCNC: 25 U/L (ref 0–50)
ANION GAP SERPL CALCULATED.3IONS-SCNC: 5 MMOL/L (ref 3–14)
AST SERPL W P-5'-P-CCNC: 25 U/L (ref 0–45)
BILIRUB SERPL-MCNC: 0.8 MG/DL (ref 0.2–1.3)
BUN SERPL-MCNC: 12 MG/DL (ref 7–30)
CALCIUM SERPL-MCNC: 9.2 MG/DL (ref 8.5–10.1)
CHLORIDE SERPL-SCNC: 101 MMOL/L (ref 94–109)
CHOLEST SERPL-MCNC: 175 MG/DL
CO2 SERPL-SCNC: 32 MMOL/L (ref 20–32)
CREAT SERPL-MCNC: 0.69 MG/DL (ref 0.52–1.04)
CREAT UR-MCNC: 149 MG/DL
GFR SERPL CREATININE-BSD FRML MDRD: >90 ML/MIN/{1.73_M2}
GLUCOSE SERPL-MCNC: 149 MG/DL (ref 70–99)
HBA1C MFR BLD: 6.7 % (ref 0–5.6)
HDLC SERPL-MCNC: 44 MG/DL
LDLC SERPL CALC-MCNC: 98 MG/DL
MICROALBUMIN UR-MCNC: 25 MG/L
MICROALBUMIN/CREAT UR: 16.64 MG/G CR (ref 0–25)
NONHDLC SERPL-MCNC: 131 MG/DL
POTASSIUM SERPL-SCNC: 3.7 MMOL/L (ref 3.4–5.3)
PROT SERPL-MCNC: 7.9 G/DL (ref 6.8–8.8)
SODIUM SERPL-SCNC: 138 MMOL/L (ref 133–144)
TRIGL SERPL-MCNC: 165 MG/DL
TSH SERPL DL<=0.005 MIU/L-ACNC: 3.97 MU/L (ref 0.4–4)

## 2019-11-26 PROCEDURE — 90715 TDAP VACCINE 7 YRS/> IM: CPT | Performed by: FAMILY MEDICINE

## 2019-11-26 PROCEDURE — 90471 IMMUNIZATION ADMIN: CPT | Performed by: FAMILY MEDICINE

## 2019-11-26 PROCEDURE — 99213 OFFICE O/P EST LOW 20 MIN: CPT | Mod: 25 | Performed by: FAMILY MEDICINE

## 2019-11-26 PROCEDURE — 84443 ASSAY THYROID STIM HORMONE: CPT | Performed by: FAMILY MEDICINE

## 2019-11-26 PROCEDURE — 36415 COLL VENOUS BLD VENIPUNCTURE: CPT | Performed by: FAMILY MEDICINE

## 2019-11-26 PROCEDURE — 99396 PREV VISIT EST AGE 40-64: CPT | Mod: 25 | Performed by: FAMILY MEDICINE

## 2019-11-26 PROCEDURE — 90472 IMMUNIZATION ADMIN EACH ADD: CPT | Performed by: FAMILY MEDICINE

## 2019-11-26 PROCEDURE — 80053 COMPREHEN METABOLIC PANEL: CPT | Performed by: FAMILY MEDICINE

## 2019-11-26 PROCEDURE — 83036 HEMOGLOBIN GLYCOSYLATED A1C: CPT | Performed by: FAMILY MEDICINE

## 2019-11-26 PROCEDURE — 99207 C FOOT EXAM  NO CHARGE: CPT | Mod: 25 | Performed by: FAMILY MEDICINE

## 2019-11-26 PROCEDURE — 80061 LIPID PANEL: CPT | Performed by: FAMILY MEDICINE

## 2019-11-26 PROCEDURE — 90682 RIV4 VACC RECOMBINANT DNA IM: CPT | Performed by: FAMILY MEDICINE

## 2019-11-26 PROCEDURE — 82043 UR ALBUMIN QUANTITATIVE: CPT | Performed by: FAMILY MEDICINE

## 2019-11-26 RX ORDER — CARVEDILOL 6.25 MG/1
TABLET ORAL
Qty: 180 TABLET | Refills: 3 | Status: SHIPPED | OUTPATIENT
Start: 2019-11-26 | End: 2020-11-11

## 2019-11-26 ASSESSMENT — ENCOUNTER SYMPTOMS
HEMATURIA: 0
HEARTBURN: 0
EYE PAIN: 0
SORE THROAT: 0
WEAKNESS: 0
ABDOMINAL PAIN: 0
ARTHRALGIAS: 0
FREQUENCY: 0
CHILLS: 0
HEADACHES: 0
DIARRHEA: 0
NAUSEA: 0
COUGH: 0
PALPITATIONS: 0
JOINT SWELLING: 0
NERVOUS/ANXIOUS: 0
BREAST MASS: 0
FEVER: 0
CONSTIPATION: 0
HEMATOCHEZIA: 0
PARESTHESIAS: 0
DYSURIA: 0
DIZZINESS: 0
MYALGIAS: 0
SHORTNESS OF BREATH: 0

## 2019-11-26 ASSESSMENT — MIFFLIN-ST. JEOR: SCORE: 1792.65

## 2019-11-26 NOTE — RESULT ENCOUNTER NOTE
Patient was seen today in clinic.  I discussed results in clinic, please see clinic progress note.    Gretchen Vargas MD 11/26/2019

## 2019-11-26 NOTE — PROGRESS NOTES
SUBJECTIVE:   CC: Lesli Jackson is an 59 year old woman who presents for preventive health visit.     Healthy Habits:     Getting at least 3 servings of Calcium per day:  Yes    Bi-annual eye exam:  NO    Dental care twice a year:  Yes    Sleep apnea or symptoms of sleep apnea:  None    Diet:  Vegetarian/vegan    Frequency of exercise:  6-7 days/week    Duration of exercise:  15-30 minutes    Taking medications regularly:  Yes    Barriers to taking medications:  None    Medication side effects:  None    PHQ-2 Total Score: 0    Additional concerns today:  No    She's vegetarian, has been trying to reduce carbohydrate intake over the past few years, and has been losing weight steadily. She's been exercising regularly more; walking, and tries to add activity during the workday, standing and walking when she can. She usually gets 5,000 steps per day.    Diabetes Follow-up      How often are you checking your blood sugar? Not at all    What concerns do you have today about your diabetes? None     Do you have any of these symptoms? (Select all that apply)  No numbness or tingling in feet.  No redness, sores or blisters on feet.  No complaints of excessive thirst.  No reports of blurry vision.  No significant changes to weight.     Have you had a diabetic eye exam in the last 12 months? No     She's not taking any medication, controlling blood sugars with lifestyle alone    BP Readings from Last 2 Encounters:   11/26/19 132/86   11/16/18 120/78     Hemoglobin A1C (%)   Date Value   11/26/2019 6.7 (H)   11/16/2018 7.7 (H)     LDL Cholesterol Calculated (mg/dL)   Date Value   11/16/2018 65   09/08/2017 75       Diabetes Management Resources      Today's PHQ-2 Score:   PHQ-2 ( 1999 [employer]) 11/25/2019   Q1: Little interest or pleasure in doing things 0   Q2: Feeling down, depressed or hopeless 0   PHQ-2 Score 0   Q1: Little interest or pleasure in doing things Not at all   Q2: Feeling down, depressed or hopeless Not at all    PHQ-2 Score 0       Abuse: Current or Past(Physical, Sexual or Emotional)- No  Do you feel safe in your environment? Yes        Social History     Tobacco Use     Smoking status: Former Smoker     Years: 20.00     Smokeless tobacco: Former User     Tobacco comment: 5 cigarettes day or less   Substance Use Topics     Alcohol use: Yes     Comment: 1-2  beer daily on average     If you drink alcohol do you typically have >3 drinks per day or >7 drinks per week? No    No flowsheet data found.    Reviewed orders with patient.  Reviewed health maintenance and updated orders accordingly - Yes  BP Readings from Last 3 Encounters:   11/26/19 132/86   11/16/18 120/78   09/08/17 122/68    Wt Readings from Last 3 Encounters:   11/26/19 112.9 kg (249 lb)   11/16/18 115.7 kg (255 lb)   09/08/17 116.6 kg (257 lb)                  Patient Active Problem List   Diagnosis     Other specified idiopathic peripheral neuropathy     HYPERLIPIDEMIA LDL GOAL <130     Idiopathic edema     Nevus     Benign neoplasm of skin of trunk, except scrotum     Skin tag     Essential hypertension with goal blood pressure less than 140/90     Swelling of limb     Localized swelling, mass and lump, neck     Tinea corporis     Morbid obesity due to excess calories (H)     Abnormal vaginal bleeding     Type 2 diabetes mellitus with hyperglycemia, without long-term current use of insulin (H)     High blood triglycerides     Advance care planning     Severe obesity (BMI 35.0-35.9 with comorbidity) (H)     Past Surgical History:   Procedure Laterality Date     COLONOSCOPY  2/16/11    repeat 3 yrs, one inconclusive area       Social History     Tobacco Use     Smoking status: Former Smoker     Years: 20.00     Smokeless tobacco: Former User     Tobacco comment: 5 cigarettes day or less   Substance Use Topics     Alcohol use: Yes     Comment: 1-2  beer daily on average     Family History   Problem Relation Age of Onset     C.A.D. Father      Diabetes Father       Cerebrovascular Disease Father      Eye Disorder Father         cataracts     Heart Disease Father      Hypertension Mother      Arthritis Mother      Cancer Mother         lymphoma     Prostate Cancer Maternal Uncle      Allergies Sister      Eye Disorder Other         iritis     Lipids Other         one of her parents     Thyroid Disease Sister          Current Outpatient Medications   Medication Sig Dispense Refill     blood glucose monitoring (ACCU-CHEK FASTCLIX) lancets        carvedilol (COREG) 6.25 MG tablet TAKE 1 TABLET(6.25 MG) BY MOUTH TWICE DAILY WITH MEALS 180 tablet 3     clotrimazole (LOTRIMIN) 1 % cream Apply 1 ml to area twice daily 15 g 3     Flaxseed, Linseed, (FLAXSEED OIL) 1000 MG CAPS Take 2 capsules by mouth daily. 90 capsule 0     hydrochlorothiazide (HYDRODIURIL) 50 MG tablet TAKE 1/2 TABLET(25 MG) BY MOUTH DAILY 45 tablet 1     lisinopril (PRINIVIL/ZESTRIL) 40 MG tablet TAKE 1 AND 1/2 TABLETS BY MOUTH EVERY  tablet 1     lovastatin (MEVACOR) 20 MG tablet TAKE 1/2 TABLET BY MOUTH EVERY NIGHT AT BEDTIME 45 tablet 0     MULTIPLE VIT-MIN-CALCIUM-FA PO Take  by mouth.       Allergies   Allergen Reactions     Amoxicillin Rash     Recent Labs   Lab Test 11/26/19  0912 11/16/18  0922 09/08/17  1026 06/08/17  0827  07/08/16  0957   A1C 6.7* 7.7* 7.7* 8.2*   < > 8.7*   LDL  --  65 75 76  --  85   HDL  --  39* 39* 38*  --  35*   TRIG  --  306* 302* 240*  --  258*   ALT  --  27 26 29  --  35   CR  --  0.70 0.70 0.61  --  0.74   GFRESTIMATED  --  86 86 >90  Non  GFR Calc    --  81   GFRESTBLACK  --  >90 >90 >90  African American GFR Calc    --  >90   GFR Calc     POTASSIUM  --  3.4 3.8 3.5  --  3.6   TSH  --  2.97  --   --   --  3.16    < > = values in this interval not displayed.        Mammogram Screening: Patient over age 50, mutual decision to screen reflected in health maintenance.    Pertinent mammograms are reviewed under the imaging tab.  History of  abnormal Pap smear: NO - age 30-65 PAP every 5 years with negative HPV co-testing recommended  PAP / HPV Latest Ref Rng & Units 2016 2013 6/15/2010   PAP - NIL NIL NIL   HPV 16 DNA NEG Negative - -   HPV 18 DNA NEG Negative - -   OTHER HR HPV NEG Negative - -     Reviewed and updated as needed this visit by clinical staff  Tobacco  Allergies  Meds  Med Hx  Surg Hx  Fam Hx  Soc Hx        Reviewed and updated as needed this visit by Provider        Past Medical History:   Diagnosis Date     Baker's cyst of knee     Lt (7.3x4.3x1.6 cm)     Elevated glucose      Hyperlipidemia LDL goal <130      Hypertension goal BP (blood pressure) < 140/90      Idiopathic edema      NONSPECIFIC MEDICAL HISTORY     nephrolithiasis     Personal history of smoking     quit in      Secondary lymphedema 19    DX by [facility] [place], lose wt. at this point     Type 2 diabetes, HbA1c goal < 7% (H)      Unspecified iridocyclitis       Past Surgical History:   Procedure Laterality Date     COLONOSCOPY  11    repeat 3 yrs, one inconclusive area     OB History    Para Term  AB Living   1 0 0 0 1 0   SAB TAB Ectopic Multiple Live Births   0 1 0 0 0      # Outcome Date GA Lbr Bryn/2nd Weight Sex Delivery Anes PTL Lv   1 TAB                Review of Systems   Constitutional: Negative for chills and fever.   HENT: Negative for congestion, ear pain, hearing loss and sore throat.    Eyes: Negative for pain and visual disturbance.   Respiratory: Negative for cough and shortness of breath.    Cardiovascular: Negative for chest pain, palpitations and peripheral edema.   Gastrointestinal: Negative for abdominal pain, constipation, diarrhea, heartburn, hematochezia and nausea.   Breasts:  Negative for tenderness, breast mass and discharge.   Genitourinary: Negative for dysuria, frequency, genital sores, hematuria, pelvic pain, urgency, vaginal bleeding and vaginal discharge.   Musculoskeletal: Negative for  "arthralgias, joint swelling and myalgias.   Skin: Negative for rash.   Neurological: Negative for dizziness, weakness, headaches and paresthesias.   Psychiatric/Behavioral: Negative for mood changes. The patient is not nervous/anxious.      Family History   Problem Relation Age of Onset     C.A.D. Father      Diabetes Father      Cerebrovascular Disease Father      Eye Disorder Father         cataracts     Heart Disease Father      Hypertension Mother      Arthritis Mother      Cancer Mother         lymphoma     Prostate Cancer Maternal Uncle      Allergies Sister      Eye Disorder Other         iritis     Lipids Other         one of her parents     Thyroid Disease Sister        OBJECTIVE:   /86 (BP Location: Right arm, Patient Position: Sitting, Cuff Size: Adult Large)   Pulse 78   Temp 97.9  F (36.6  C) (Oral)   Resp 16   Ht 1.791 m (5' 10.5\")   Wt 112.9 kg (249 lb)   SpO2 95%   Breastfeeding No   BMI 35.22 kg/m    Physical Exam  GENERAL: healthy, alert and no distress  EYES: Eyes grossly normal to inspection, PERRL and conjunctivae and sclerae normal  HENT: ear canals and TM's normal, nose and mouth without ulcers or lesions  NECK: no adenopathy, no asymmetry, masses, or scars and thyroid normal to palpation  RESP: lungs clear to auscultation - no rales, rhonchi or wheezes  BREAST: normal without masses, tenderness or nipple discharge and no palpable axillary masses or adenopathy  CV: regular rate and rhythm, normal S1 S2, no S3 or S4, no murmur, click or rub, no peripheral edema and peripheral pulses strong  ABDOMEN: soft, nontender, no hepatosplenomegaly, no masses and bowel sounds normal  MS: no gross musculoskeletal defects noted, no edema  SKIN: no suspicious lesions or rashes  NEURO: Normal strength and tone, mentation intact and speech normal  PSYCH: mentation appears normal, affect normal/bright  Diabetic foot exam: normal DP and PT pulses, no trophic changes or ulcerative lesions, normal " sensory exam and normal monofilament exam    Diagnostic Test Results:  Labs reviewed in Epic    ASSESSMENT/PLAN:   1. Routine general medical examination at a health care facility  routine  - VACCINE ADMINISTRATION, EACH ADDITIONAL    2. Essential hypertension with goal blood pressure less than 140/90  BP Readings from Last 3 Encounters:   11/26/19 132/86   11/16/18 120/78   09/08/17 122/68    At goal  The current medical regimen is effective;  continue present plan and medications.    - Comprehensive metabolic panel  - Albumin Random Urine Quantitative with Creat Ratio  - carvedilol (COREG) 6.25 MG tablet; TAKE 1 TABLET(6.25 MG) BY MOUTH TWICE DAILY WITH MEALS  Dispense: 180 tablet; Refill: 3    3. Type 2 diabetes mellitus with hyperglycemia, without long-term current use of insulin (H)  At goal, doing VERY well with diet and exercise, great job!  - Hemoglobin A1c  - Comprehensive metabolic panel  - TSH with free T4 reflex  - C FOOT EXAM  NO CHARGE  - Albumin Random Urine Quantitative with Creat Ratio    4. Hyperlipidemia LDL goal <130  LDL Cholesterol Calculated   Date Value Ref Range Status   11/16/2018 65 <100 mg/dL Final     Comment:     Desirable:       <100 mg/dl    at goal  - Comprehensive metabolic panel  - Lipid panel reflex to direct LDL Fasting    5. High blood triglycerides  Triglycerides   Date Value Ref Range Status   11/16/2018 306 (H) <150 mg/dL Final     Comment:     Borderline high:  150-199 mg/dl  High:             200-499 mg/dl  Very high:       >499 mg/dl  Fasting specimen       Recheck today, she has lost weight over the past year  - Comprehensive metabolic panel    6. Visit for screening mammogram  Scheduled for her today  - Mammo Screening digital (bilat); Future    7. Need for prophylactic vaccination and inoculation against influenza  Done today  - INFLUENZA QUAD, RECOMBINANT, P-FREE (RIV4) (FLUBLOCK) [78039]    COUNSELING:  Reviewed preventive health counseling, as reflected in patient  "instructions    Estimated body mass index is 35.22 kg/m  as calculated from the following:    Height as of this encounter: 1.791 m (5' 10.5\").    Weight as of this encounter: 112.9 kg (249 lb).   Wt Readings from Last 4 Encounters:   11/26/19 112.9 kg (249 lb)   11/16/18 115.7 kg (255 lb)   09/08/17 116.6 kg (257 lb)   06/08/17 117 kg (258 lb)       Weight management plan: keep up the good work!     reports that she has quit smoking. She quit after 20.00 years of use. She has quit using smokeless tobacco.      Counseling Resources:  ATP IV Guidelines  Pooled Cohorts Equation Calculator  Breast Cancer Risk Calculator  FRAX Risk Assessment  ICSI Preventive Guidelines  Dietary Guidelines for Americans, 2010  USDA's MyPlate  ASA Prophylaxis  Lung CA Screening    Gretchen Vargas MD  [facility]  "

## 2019-11-26 NOTE — PATIENT INSTRUCTIONS
I strongly recommend getting the shingles vaccine (Shingrix) if you are over age 50, but please check with your insurance to see how much you might have to pay for this vaccine! If you are on Medicare, it's covered if you get it at a pharmacy but not in a clinic.    This shot will prevent shingles, a painful skin rash that you are at risk for getting if you have ever had chicken pox. Sometimes the pain will last the rest of your life, even after the rash has healed, and there is not much that we can do to relieve the pain.    Please consider getting this vaccine! You'll need #2 vaccines 2-4 months apart.    Clinical referral to Mammography [facility] Appointment line [phone]  Preventive Health Recommendations  Female Ages 50 - 64    Yearly exam: See your health care provider every year in order to  o Review health changes.   o Discuss preventive care.    o Review your medicines if your doctor has prescribed any.      Get a Pap test every three years (unless you have an abnormal result and your provider advises testing more often).    If you get Pap tests with HPV test, you only need to test every 5 years, unless you have an abnormal result.     You do not need a Pap test if your uterus was removed (hysterectomy) and you have not had cancer.    You should be tested each year for STDs (sexually transmitted diseases) if you're at risk.     Have a mammogram every 1 to 2 years.    Have a colonoscopy at age 50, or have a yearly FIT test (stool test). These exams screen for colon cancer.      Have a cholesterol test every 5 years, or more often if advised.    Have a diabetes test (fasting glucose) every three years. If you are at risk for diabetes, you should have this test more often.     If you are at risk for osteoporosis (brittle bone disease), think about having a bone density scan (DEXA).    Shots: Get a flu shot each year. Get a tetanus shot every 10 years.    Nutrition:     Eat at least 5  servings of fruits and vegetables each day.    Eat whole-grain bread, whole-wheat pasta and brown rice instead of white grains and rice.    Get adequate Calcium and Vitamin D.     Lifestyle    Exercise at least 150 minutes a week (30 minutes a day, 5 days a week). This will help you control your weight and prevent disease.    Limit alcohol to one drink per day.    No smoking.     Wear sunscreen to prevent skin cancer.     See your dentist every six months for an exam and cleaning.    See your eye doctor every 1 to 2 years.

## 2019-11-26 NOTE — NURSING NOTE
Prior to immunization administration, verified patients identity using patient s name and date of birth. Please see Immunization Activity for additional information.     Screening Questionnaire for Adult Immunization    Are you sick today?   No   Do you have allergies to medications, food, a vaccine component or latex?   No   Have you ever had a serious reaction after receiving a vaccination?   No   Do you have a long-term health problem with heart disease, lung disease, asthma, kidney disease, metabolic disease (e.g. diabetes), anemia, or other blood disorder?   No   Do you have cancer, leukemia, HIV/AIDS, or any other immune system problem?   No   In the past 3 months, have you taken medications that affect  your immune system, such as prednisone, other steroids, or anticancer drugs; drugs for the treatment of rheumatoid arthritis, Crohn s disease, or psoriasis; or have you had radiation treatments?   No   Have you had a seizure, or a brain or other nervous system problem?   No   During the past year, have you received a transfusion of blood or blood     products, or been given immune (gamma) globulin or antiviral drug?   No   For women: Are you pregnant or is there a chance you could become        pregnant during the next month?   No   Have you received any vaccinations in the past 4 weeks?   No     Immunization questionnaire answers were all negative.        Per orders of Dr. Vargas, injection of Flu and TDAP given by Ji Herron. Patient instructed to remain in clinic for 15 minutes afterwards, and to report any adverse reaction to me immediately.    Due to injection administration, patient instructed to remain in clinic for 15 minutes  afterwards, and to report any adverse reaction to me immediately.         Screening performed by Ji Herron on 11/26/2019 at 10:20 AM.

## 2019-11-29 NOTE — RESULT ENCOUNTER NOTE
Hello!  It was a pleasure to see you in clinic!  Thank you for getting labs done.     Your microalbumen is normal, which is great news. This means you do not have protein in the urine, and that your kidneys are currently functioning well. Keeping blood pressure and blood fats low is the best way to preserve your kidney function over your lifetime.     Your thyroid function is normal, which is good news.  This means that you do not have hyperthyroidism or hypothyroidism.     The testing of your blood sugar, kidney function, liver function and electrolytes was normal.     Your HgA1C, also called glycosylated hemoglobin, which measures the level of sugar in your blood over the past few months, is at goal, and going DOWN every year, which is great! Congratulations! You're doing a fantastic job with weight loss, exercise and diet changes!  I've never seen anyone else lower their A1C as much as you have with lifestyle changes, you've been very dedicated!    Your cholesterol is great improved as well. Your triglycerides (which reflect carbohydrates in the diet) are HALF what they've been in the past, and your HDL is higher, reflecting the increase in exercise you've adopted! Fantastic job!    Sincerely,  Dr. Gretchen Vargas MD  11/29/2019

## 2019-12-06 DIAGNOSIS — E78.5 HYPERLIPIDEMIA LDL GOAL <130: ICD-10-CM

## 2019-12-06 NOTE — TELEPHONE ENCOUNTER
"Requested Prescriptions   Pending Prescriptions Disp Refills     lovastatin (MEVACOR) 20 MG tablet [Pharmacy Med Name: LOVASTATIN 20MG TABLETS]  Last Written Prescription Date:  9/6/2019  Last Fill Quantity: 45 tabs,  # refills: 0   Last office visit: 11/26/2019 with prescribing provider:  Sam   Future Office Visit:   45 tablet 0     Sig: TAKE 1/2 TABLET BY MOUTH EVERY NIGHT AT BEDTIME       Statins Protocol Passed - 12/6/2019 11:18 AM        Passed - LDL on file in past 12 months     Recent Labs   Lab Test 11/26/19  0912   LDL 98             Passed - No abnormal creatine kinase in past 12 months     No lab results found.             Passed - Recent (12 mo) or future (30 days) visit within the authorizing provider's specialty     Patient has had an office visit with the authorizing provider or a provider within the authorizing providers department within the previous 12 mos or has a future within next 30 days. See \"Patient Info\" tab in inbasket, or \"Choose Columns\" in Meds & Orders section of the refill encounter.              Passed - Medication is active on med list        Passed - Patient is age 18 or older        Passed - No active pregnancy on record        Passed - No positive pregnancy test in past 12 months         "

## 2019-12-08 RX ORDER — LOVASTATIN 20 MG
TABLET ORAL
Qty: 45 TABLET | Refills: 3 | Status: SHIPPED | OUTPATIENT
Start: 2019-12-08 | End: 2020-11-11

## 2019-12-08 NOTE — TELEPHONE ENCOUNTER
Signed Prescriptions:                        Disp   Refills    lovastatin (MEVACOR) 20 MG tablet          45 tab*3        Sig: TAKE 1/2 TABLET BY MOUTH EVERY NIGHT AT BEDTIME  Authorizing Provider: SAUNDRA MAYO  Ordering User: DEBI DOUGLAS

## 2019-12-09 ENCOUNTER — ANCILLARY PROCEDURE (OUTPATIENT)
Dept: MAMMOGRAPHY | Facility: CLINIC | Age: 59
End: 2019-12-09
Attending: FAMILY MEDICINE
Payer: COMMERCIAL

## 2019-12-09 DIAGNOSIS — Z12.31 VISIT FOR SCREENING MAMMOGRAM: ICD-10-CM

## 2019-12-09 PROCEDURE — 77067 SCR MAMMO BI INCL CAD: CPT

## 2019-12-13 ENCOUNTER — TELEPHONE (OUTPATIENT)
Dept: FAMILY MEDICINE | Facility: CLINIC | Age: 59
End: 2019-12-13

## 2019-12-13 DIAGNOSIS — E11.65 TYPE 2 DIABETES MELLITUS WITH HYPERGLYCEMIA, WITHOUT LONG-TERM CURRENT USE OF INSULIN (H): Primary | ICD-10-CM

## 2019-12-13 NOTE — TELEPHONE ENCOUNTER
Lesli is due for an eye exam (or we don't have a copy of her exam from her eye doctor).    Please ask her to schedule appointment with ophthalmologist, thanks, see referral below, thanks!    1 Plan: OPHTHALMOLOGY ADULT REFERRAL           [facility]: Eye Clinic [facility] [phone]   [email or web address]    Sincerely,  Dr. Gretchen Vargas MD  12/13/2019      Health Maintenance Due   Topic Date Due     PNEUMOCOCCAL IMMUNIZATION 19-64 MEDIUM RISK (1 of 1 - PPSV23) 09/19/1979     ZOSTER IMMUNIZATION (1 of 2) 09/19/2010     EYE EXAM  11/16/2019

## 2020-02-19 DIAGNOSIS — I10 ESSENTIAL HYPERTENSION WITH GOAL BLOOD PRESSURE LESS THAN 140/90: ICD-10-CM

## 2020-02-19 RX ORDER — LISINOPRIL 40 MG/1
TABLET ORAL
Qty: 0.1 TABLET | Refills: 0 | OUTPATIENT
Start: 2020-02-19

## 2020-02-19 RX ORDER — HYDROCHLOROTHIAZIDE 50 MG/1
TABLET ORAL
Qty: 0.1 TABLET | Refills: 0 | OUTPATIENT
Start: 2020-02-19

## 2020-02-19 NOTE — TELEPHONE ENCOUNTER
"Requested Prescriptions   Pending Prescriptions Disp Refills     HYDROCHLOROTHIAZIDE 50 MG PO tablet [Pharmacy Med Name: HYDROCHLOROTHIAZIDE 50MG TABLETS] 45 tablet 1     Sig: TAKE ONE-HALF TABLET BY MOUTH EVERY DAY  Last Written Prescription Date:  11/7/2019  Last Fill Quantity: 45 tablet,  # refills: 1   Last Office Visit: 11/26/2019   Future Office Visit:            Diuretics (Including Combos) Protocol Passed - 2/19/2020  5:29 AM        Passed - Blood pressure under 140/90 in past 12 months     BP Readings from Last 3 Encounters:   11/26/19 132/86   11/16/18 120/78   09/08/17 122/68           Passed - Recent (12 mo) or future (30 days) visit within the authorizing provider's specialty     Patient has had an office visit with the authorizing provider or a provider within the authorizing providers department within the previous 12 mos or has a future within next 30 days. See \"Patient Info\" tab in inbasket, or \"Choose Columns\" in Meds & Orders section of the refill encounter.            Passed - Medication is active on med list        Passed - Patient is age 18 or older        Passed - No active pregancy on record        Passed - Normal serum creatinine on file in past 12 months     Recent Labs   Lab Test 11/26/19  0912   CR 0.69            Passed - Normal serum potassium on file in past 12 months     Recent Labs   Lab Test 11/26/19  0912   POTASSIUM 3.7            Passed - Normal serum sodium on file in past 12 months     Recent Labs   Lab Test 11/26/19  0912               Passed - No positive pregnancy test in past 12 months     _________________________________________________________________       LISINOPRIL 40 MG PO tablet [Pharmacy Med Name: LISINOPRIL 40MG TABLETS] 135 tablet 1     Sig: TAKE 1 AND 1/2 TABLETS BY MOUTH EVERY DAY  Last Written Prescription Date:  11/7/2019  Last Fill Quantity: 135 tablet,  # refills: 1   Last Office Visit: 11/26/2019   Future Office Visit:            ACE Inhibitors " "(Including Combos) Protocol Passed - 2/19/2020  5:29 AM        Passed - Blood pressure under 140/90 in past 12 months     BP Readings from Last 3 Encounters:   11/26/19 132/86   11/16/18 120/78   09/08/17 122/68           Passed - Recent (12 mo) or future (30 days) visit within the authorizing provider's specialty     Patient has had an office visit with the authorizing provider or a provider within the authorizing providers department within the previous 12 mos or has a future within next 30 days. See \"Patient Info\" tab in inbasket, or \"Choose Columns\" in Meds & Orders section of the refill encounter.            Passed - Medication is active on med list        Passed - Patient is age 18 or older        Passed - No active pregnancy on record        Passed - Normal serum creatinine on file in past 12 months     Recent Labs   Lab Test 11/26/19  0912   CR 0.69           Passed - Normal serum potassium on file in past 12 months     Recent Labs   Lab Test 11/26/19 0912   POTASSIUM 3.7           Passed - No positive pregnancy test within past 12 months          "

## 2020-02-20 ENCOUNTER — MYC REFILL (OUTPATIENT)
Dept: FAMILY MEDICINE | Facility: CLINIC | Age: 60
End: 2020-02-20

## 2020-02-20 DIAGNOSIS — I10 ESSENTIAL HYPERTENSION WITH GOAL BLOOD PRESSURE LESS THAN 140/90: ICD-10-CM

## 2020-02-20 RX ORDER — HYDROCHLOROTHIAZIDE 50 MG/1
TABLET ORAL
Qty: 45 TABLET | Refills: 1 | Status: CANCELLED | OUTPATIENT
Start: 2020-02-20

## 2020-02-20 RX ORDER — LISINOPRIL 40 MG/1
TABLET ORAL
Qty: 135 TABLET | Refills: 1 | Status: CANCELLED | OUTPATIENT
Start: 2020-02-20

## 2020-02-20 NOTE — TELEPHONE ENCOUNTER
Refused Prescriptions:                       Disp   Refills    HYDROCHLOROTHIAZIDE 50 MG PO tablet [Pharm*0.1 ta*0        Sig: TAKE ONE-HALF TABLET BY MOUTH EVERY DAY  Refused By: DEBI DOUGLAS  Reason for Refusal: Duplicate    LISINOPRIL 40 MG PO tablet [Pharmacy Med N*0.1 ta*0        Sig: TAKE 1 AND 1/2 TABLETS BY MOUTH EVERY DAY  Refused By: DEBI DOUGLAS  Reason for Refusal: Duplicate

## 2020-02-20 NOTE — TELEPHONE ENCOUNTER
"Requested Prescriptions   Pending Prescriptions Disp Refills     hydrochlorothiazide 50 MG PO tablet  Last Written Prescription Date:  11/7/2019  Last Fill Quantity: 45 tablet,  # refills: 1   Last Office Visit: 11/26/2019   Future Office Visit:      45 tablet 1       Diuretics (Including Combos) Protocol Passed - 2/20/2020  2:09 PM        Passed - Blood pressure under 140/90 in past 12 months     BP Readings from Last 3 Encounters:   11/26/19 132/86   11/16/18 120/78   09/08/17 122/68           Passed - Recent (12 mo) or future (30 days) visit within the authorizing provider's specialty     Patient has had an office visit with the authorizing provider or a provider within the authorizing providers department within the previous 12 mos or has a future within next 30 days. See \"Patient Info\" tab in inbasket, or \"Choose Columns\" in Meds & Orders section of the refill encounter.            Passed - Medication is active on med list        Passed - Patient is age 18 or older        Passed - No active pregancy on record        Passed - Normal serum creatinine on file in past 12 months     Recent Labs   Lab Test 11/26/19  0912   CR 0.69            Passed - Normal serum potassium on file in past 12 months     Recent Labs   Lab Test 11/26/19 0912   POTASSIUM 3.7            Passed - Normal serum sodium on file in past 12 months     Recent Labs   Lab Test 11/26/19  0912               Passed - No positive pregnancy test in past 12 months     _________________________________________________________________       lisinopril 40 MG PO tablet  Last Written Prescription Date:  11/7/2019  Last Fill Quantity: 135 tablet,  # refills: 1   Last Office Visit: 11/26/2019   Future Office Visit:      135 tablet 1       ACE Inhibitors (Including Combos) Protocol Passed - 2/20/2020  2:09 PM        Passed - Blood pressure under 140/90 in past 12 months     BP Readings from Last 3 Encounters:   11/26/19 132/86   11/16/18 120/78   09/08/17 " "122/68           Passed - Recent (12 mo) or future (30 days) visit within the authorizing provider's specialty     Patient has had an office visit with the authorizing provider or a provider within the authorizing providers department within the previous 12 mos or has a future within next 30 days. See \"Patient Info\" tab in inbasket, or \"Choose Columns\" in Meds & Orders section of the refill encounter.            Passed - Medication is active on med list        Passed - Patient is age 18 or older        Passed - No active pregnancy on record        Passed - Normal serum creatinine on file in past 12 months     Recent Labs   Lab Test 11/26/19  0912   CR 0.69           Passed - Normal serum potassium on file in past 12 months     Recent Labs   Lab Test 11/26/19  0912   POTASSIUM 3.7           Passed - No positive pregnancy test within past 12 months          "

## 2020-03-09 DIAGNOSIS — I10 ESSENTIAL HYPERTENSION WITH GOAL BLOOD PRESSURE LESS THAN 140/90: ICD-10-CM

## 2020-03-09 NOTE — TELEPHONE ENCOUNTER
"Requested Prescriptions   Pending Prescriptions Disp Refills     lisinopril (ZESTRIL) 40 MG tablet [Pharmacy Med Name: LISINOPRIL 40MG TABLETS] 135 tablet 1     Sig: TAKE 1 AND 1/2 TABLETS BY MOUTH EVERY DAY  Last Written Prescription Date:  11/7/2019  Last Fill Quantity: 135 tablet,  # refills: 1   Last Office Visit: 11/26/2019   Future Office Visit:            ACE Inhibitors (Including Combos) Protocol Passed - 3/9/2020 12:41 PM        Passed - Blood pressure under 140/90 in past 12 months     BP Readings from Last 3 Encounters:   11/26/19 132/86   11/16/18 120/78   09/08/17 122/68           Passed - Recent (12 mo) or future (30 days) visit within the authorizing provider's specialty     Patient has had an office visit with the authorizing provider or a provider within the authorizing providers department within the previous 12 mos or has a future within next 30 days. See \"Patient Info\" tab in inbasket, or \"Choose Columns\" in Meds & Orders section of the refill encounter.            Passed - Medication is active on med list        Passed - Patient is age 18 or older        Passed - No active pregnancy on record        Passed - Normal serum creatinine on file in past 12 months     Recent Labs   Lab Test 11/26/19  0912   CR 0.69           Passed - Normal serum potassium on file in past 12 months     Recent Labs   Lab Test 11/26/19  0912   POTASSIUM 3.7           Passed - No positive pregnancy test within past 12 months             "

## 2020-03-12 RX ORDER — LISINOPRIL 40 MG/1
TABLET ORAL
Qty: 0.1 TABLET | Refills: 0 | OUTPATIENT
Start: 2020-03-12

## 2020-03-12 NOTE — TELEPHONE ENCOUNTER
Refused Prescriptions:                       Disp   Refills    lisinopril (ZESTRIL) 40 MG tablet [Pharmac*0.1 ta*0        Sig: TAKE 1 AND 1/2 TABLETS BY MOUTH EVERY DAY  Refused By: DEBI DOUGLAS  Reason for Refusal: Duplicate

## 2020-03-13 ENCOUNTER — MYC REFILL (OUTPATIENT)
Dept: FAMILY MEDICINE | Facility: CLINIC | Age: 60
End: 2020-03-13

## 2020-03-13 DIAGNOSIS — I10 ESSENTIAL HYPERTENSION WITH GOAL BLOOD PRESSURE LESS THAN 140/90: ICD-10-CM

## 2020-03-13 RX ORDER — LISINOPRIL 40 MG/1
TABLET ORAL
Qty: 135 TABLET | Refills: 1 | Status: CANCELLED | OUTPATIENT
Start: 2020-03-13

## 2020-03-16 NOTE — TELEPHONE ENCOUNTER
Patient requesting to speak with Dr Vargas' nurse.  [facility] is telling her she has no refills and she doesn't understand why this is not being filled.  Patient has been out of this med since 3-.    Please call today.  OK to LM on VM.

## 2020-03-16 NOTE — TELEPHONE ENCOUNTER
I called pharmacy and they said they did not have enough to give a full 3 months so they were not giving patient any at all.  I gave approval for a full 3 month supply and they will fill for that.  Daiana Del Real RN

## 2020-05-14 DIAGNOSIS — I10 ESSENTIAL HYPERTENSION WITH GOAL BLOOD PRESSURE LESS THAN 140/90: ICD-10-CM

## 2020-05-16 RX ORDER — HYDROCHLOROTHIAZIDE 50 MG/1
TABLET ORAL
Qty: 45 TABLET | Refills: 1 | Status: SHIPPED | OUTPATIENT
Start: 2020-05-16 | End: 2020-08-12

## 2020-05-17 NOTE — TELEPHONE ENCOUNTER
"Requested Prescriptions   Pending Prescriptions Disp Refills     hydrochlorothiazide (HYDRODIURIL) 50 MG tablet [Pharmacy Med Name: HYDROCHLOROTHIAZIDE 50MG TABLETS] 45 tablet 1     Sig: TAKE ONE-HALF TABLET BY MOUTH EVERY DAY       Diuretics (Including Combos) Protocol Passed - 5/14/2020  7:24 PM        Passed - Blood pressure under 140/90 in past 12 months     BP Readings from Last 3 Encounters:   11/26/19 132/86   11/16/18 120/78   09/08/17 122/68                 Passed - Recent (12 mo) or future (30 days) visit within the authorizing provider's specialty     Patient has had an office visit with the authorizing provider or a provider within the authorizing providers department within the previous 12 mos or has a future within next 30 days. See \"Patient Info\" tab in inbasket, or \"Choose Columns\" in Meds & Orders section of the refill encounter.              Passed - Medication is active on med list        Passed - Patient is age 18 or older        Passed - No active pregancy on record        Passed - Normal serum creatinine on file in past 12 months     Recent Labs   Lab Test 11/26/19  0912   CR 0.69              Passed - Normal serum potassium on file in past 12 months     Recent Labs   Lab Test 11/26/19  0912   POTASSIUM 3.7                    Passed - Normal serum sodium on file in past 12 months     Recent Labs   Lab Test 11/26/19  0912                 Passed - No positive pregnancy test in past 12 months           Signed Prescriptions:                        Disp   Refills    hydrochlorothiazide (HYDRODIURIL) 50 MG ta*45 tab*1        Sig: TAKE ONE-HALF TABLET BY MOUTH EVERY DAY  Authorizing Provider: SAUNDRA MAYO  Ordering User: DEBI DOUGLAS      "

## 2020-07-12 ENCOUNTER — OFFICE VISIT (OUTPATIENT)
Dept: URGENT CARE | Facility: URGENT CARE | Age: 60
End: 2020-07-12
Payer: COMMERCIAL

## 2020-07-12 VITALS
SYSTOLIC BLOOD PRESSURE: 148 MMHG | BODY MASS INDEX: 32.23 KG/M2 | DIASTOLIC BLOOD PRESSURE: 91 MMHG | HEIGHT: 72 IN | WEIGHT: 238 LBS | HEART RATE: 68 BPM | OXYGEN SATURATION: 97 % | TEMPERATURE: 97.4 F

## 2020-07-12 DIAGNOSIS — S01.80XA OPEN WOUND OF FACE, INITIAL ENCOUNTER: Primary | ICD-10-CM

## 2020-07-12 PROCEDURE — 12011 RPR F/E/E/N/L/M 2.5 CM/<: CPT | Performed by: FAMILY MEDICINE

## 2020-07-12 ASSESSMENT — MIFFLIN-ST. JEOR: SCORE: 1766.56

## 2020-07-13 NOTE — PROGRESS NOTES
"SUBJECTIVE:     Chief Complaint   Patient presents with     Urgent Care     Pt in clinic to have eval for upper lip laceration due to fall.     Laceration     Lesli Jackson is a 59 year old female who presents to the clinic with a laceration on the upper lip sustained 1 hour(s) ago.  This is a non-work related and accidental injury.  No LOC.  Denies any teeth problems  Mechanism of injury: fell, hit a brick.    Associated symptoms: Denies loss of consciousness, vomiting or confusion.  Denies numbness, weakness, or loss of function  Last tetanus booster within 10 years: yes, 2019    EXAM:   The patient appears today in alert,no apparent distress distress  VITALS: BP (!) 148/91   Pulse 68   Temp 97.4  F (36.3  C) (Oral)   Ht 1.829 m (6')   Wt 108 kg (238 lb)   LMP 12/22/2012   SpO2 97%   BMI 32.28 kg/m      Size of laceration: 1.5 centimeters  Characteristics of the laceration: bleeding- mild, clean and irregular \"Z\", up to vermilion border.  Superficial abrasion on upper lip   Tendon function intact: not applicable  Sensation to light touch intact: yes  Pulses intact: not applicable  Picture included in patient's chart: no    Assessment:  Open wound of face, initial encounter    PLAN:  PROCEDURE NOTE::  Wound was locally injected with 3 cc's of Lidocaine 1% with epinephrine  Good anesthesia was obtained  Prepped and draped in the usual sterile fashion  Wound cleaned with betadine/saline solution  Laceration was closed using 5 6-0 sutures interrupted sutures  After care instructions:  Keep wound clean and dry for the next 24-48 hours  Sutures out in 5 - 7 days  Signs of infection discussed today  Apply anti-bacterial ointment for 3-5 days  Discussed the probability of scarring    Vicente Deluca MD  July 12, 2020 8:28 PM            "

## 2020-07-13 NOTE — PATIENT INSTRUCTIONS
Keep clean and dry for 24 hours  Topical ointment to wound for next 3-5 days    Suture removal in 5-7 days      Patient Education     Face Laceration: Stitches or Tape  A laceration is a cut through the skin. This will require stitches if it is deep. Minor cuts may be treated with surgical tape.    Home care    Your healthcare provider may prescribe an antibiotic. This is to help prevent infection. Follow all instructions for taking this medicine. Take the medicine every day until it is gone or you are told to stop. You should not have any left over.    The healthcare provider may prescribe medicines for pain. Follow instructions for taking them.    Follow the healthcare provider s instructions on how to care for the cut.    Wash your hands with soap and warm water before and after caring for the cut. This helps prevent infection.    If a bandage was applied and it becomes wet or dirty, replace it. Otherwise, leave it in place for the first 24 hours, then change it once a day or as directed.    If stitches were used, clean the wound daily:  ? After removing the bandage, wash the area with soap and water. Use a wet cotton swab to loosen and remove any blood or crust that forms.  ? After cleaning, keep the wound clean and dry. Talk with your healthcare provider before putting any antibiotic ointment on the wound. Reapply a fresh bandage.  ? You may remove the bandage to shower as usual after the first 24 hours, but don't soak the area in water (no swimming) until the sutures are removed.    If surgical tape was used, keep the area clean and dry. If it becomes wet, blot it dry with a towel.    Most facial skin wounds heal without problems. But an infection sometimes occurs despite proper treatment. Watch for the signs of infection listed below.  Follow-up care  Follow up with your healthcare provider as advised. Be sure to return for removal of the stitches as directed. Ask your provider how long stitches should remain  in place. If surgical tape closures were used, you may remove them yourself when your provider recommends if they have not fallen off on their own.  When to seek medical advice  Call your healthcare provider right away if any of these occur:    Wound bleeding not controlled by direct pressure    Signs of infection, including increasing pain in the wound, increasing wound redness or swelling, or pus or bad odor coming from the wound    Fever of 100.4 F (38 C) or higher, or as directed by your healthcare provider    Stitches come apart or fall out or surgical tape falls off before 5 days    Wound edges reopen    Wound changes colors    Numbness around the wound   Date Last Reviewed: 7/1/2017 2000-2019 [employer] [address] [place] [address], [place] [address]. All rights reserved. This information is not intended as a substitute for professional medical care. Always follow your healthcare professional's instructions.

## 2020-07-19 ENCOUNTER — OFFICE VISIT (OUTPATIENT)
Dept: URGENT CARE | Facility: URGENT CARE | Age: 60
End: 2020-07-19
Payer: COMMERCIAL

## 2020-07-19 VITALS — OXYGEN SATURATION: 97 % | HEART RATE: 71 BPM | DIASTOLIC BLOOD PRESSURE: 81 MMHG | SYSTOLIC BLOOD PRESSURE: 138 MMHG

## 2020-07-19 DIAGNOSIS — K13.0 RASH ON LIPS: Primary | ICD-10-CM

## 2020-07-19 PROCEDURE — 99212 OFFICE O/P EST SF 10 MIN: CPT | Performed by: PREVENTIVE MEDICINE

## 2020-07-19 NOTE — PROGRESS NOTES
SUBJECTIVE:  Lesli Jackson is a 59 year old female who presents to the clinic today for a rash.  Onset of rash was 2 day(s) ago.   Rash is gradual onset.  Location of the rash: lip.  Quality/symptoms of rash: crusting   Symptoms are mild and rash seems to be not changing over the course of time.  Previous history of a similar rash? No  Recent exposure history: laceration repair    Associated symptoms include: nothing.    Past Medical History:   Diagnosis Date     Baker's cyst of knee     Lt (7.3x4.3x1.6 cm)     Elevated glucose      Hyperlipidemia LDL goal <130      Hypertension goal BP (blood pressure) < 140/90      Idiopathic edema      NONSPECIFIC MEDICAL HISTORY     nephrolithiasis     Personal history of smoking     quit in 2010     Secondary lymphedema 8/4/19    DX by [facility] [place], lose wt. at this point     Type 2 diabetes, HbA1c goal < 7% (H)      Unspecified iridocyclitis      Current Outpatient Medications   Medication Sig Dispense Refill     carvedilol (COREG) 6.25 MG tablet TAKE 1 TABLET(6.25 MG) BY MOUTH TWICE DAILY WITH MEALS 180 tablet 3     clotrimazole (LOTRIMIN) 1 % cream Apply 1 ml to area twice daily 15 g 3     Flaxseed, Linseed, (FLAXSEED OIL) 1000 MG CAPS Take 2 capsules by mouth daily. 90 capsule 0     hydrochlorothiazide (HYDRODIURIL) 50 MG tablet TAKE ONE-HALF TABLET BY MOUTH EVERY DAY 45 tablet 1     lisinopril (ZESTRIL) 40 MG tablet TAKE 1 AND 1/2 TABLETS BY MOUTH EVERY  tablet 3     lovastatin (MEVACOR) 20 MG tablet TAKE 1/2 TABLET BY MOUTH EVERY NIGHT AT BEDTIME 45 tablet 3     MULTIPLE VIT-MIN-CALCIUM-FA PO Take  by mouth.       Social History     Tobacco Use     Smoking status: Former Smoker     Years: 20.00     Smokeless tobacco: Former User     Tobacco comment: 5 cigarettes day or less   Substance Use Topics     Alcohol use: Yes     Comment: 1-2  beer daily on average       ROS:  Review of systems negative except as stated above.    EXAM:   /81   Pulse 71   LMP  12/22/2012   SpO2 97%   GENERAL: alert, no acute distress.  SKIN: Rash description:    Distribution: localized  Location: lip    Color: red,  Lesion type: granulation, linear with no other findings  GENERAL APPEARANCE: healthy, alert and no distress  EYES: EOMI,  PERRL, conjunctiva clear  NECK: supple, non-tender to palpation, no adenopathy noted  RESP: lungs clear to auscultation - no rales, rhonchi or wheezes  CV: regular rates and rhythm, normal S1 S2, no murmur noted    ASSESSMENT:  Healthy granulation tissue in area of recent laceration repair on upper lip.  Reassured and sutures are removed.    PLAN:  1) See today's orders.  2) Follow-up with primary clinic if not improving

## 2020-08-12 DIAGNOSIS — I10 ESSENTIAL HYPERTENSION WITH GOAL BLOOD PRESSURE LESS THAN 140/90: ICD-10-CM

## 2020-08-12 RX ORDER — HYDROCHLOROTHIAZIDE 50 MG/1
TABLET ORAL
Qty: 45 TABLET | Refills: 1 | Status: SHIPPED | OUTPATIENT
Start: 2020-08-12 | End: 2021-02-09

## 2021-01-08 ENCOUNTER — OFFICE VISIT (OUTPATIENT)
Dept: FAMILY MEDICINE | Facility: CLINIC | Age: 61
End: 2021-01-08
Payer: COMMERCIAL

## 2021-01-08 VITALS
HEIGHT: 71 IN | RESPIRATION RATE: 16 BRPM | DIASTOLIC BLOOD PRESSURE: 78 MMHG | OXYGEN SATURATION: 98 % | TEMPERATURE: 97.6 F | WEIGHT: 239.6 LBS | SYSTOLIC BLOOD PRESSURE: 138 MMHG | HEART RATE: 79 BPM | BODY MASS INDEX: 33.54 KG/M2

## 2021-01-08 DIAGNOSIS — E78.5 HYPERLIPIDEMIA LDL GOAL <130: ICD-10-CM

## 2021-01-08 DIAGNOSIS — I10 ESSENTIAL HYPERTENSION WITH GOAL BLOOD PRESSURE LESS THAN 140/90: ICD-10-CM

## 2021-01-08 DIAGNOSIS — Z23 NEED FOR PROPHYLACTIC VACCINATION AND INOCULATION AGAINST INFLUENZA: ICD-10-CM

## 2021-01-08 DIAGNOSIS — E78.1 HIGH BLOOD TRIGLYCERIDES: ICD-10-CM

## 2021-01-08 DIAGNOSIS — Z00.00 ROUTINE GENERAL MEDICAL EXAMINATION AT A HEALTH CARE FACILITY: Primary | ICD-10-CM

## 2021-01-08 DIAGNOSIS — E66.811 OBESITY, CLASS I, BMI 30.0-34.9 (SEE ACTUAL BMI): ICD-10-CM

## 2021-01-08 DIAGNOSIS — E11.65 TYPE 2 DIABETES MELLITUS WITH HYPERGLYCEMIA, WITHOUT LONG-TERM CURRENT USE OF INSULIN (H): ICD-10-CM

## 2021-01-08 DIAGNOSIS — Z12.4 SCREENING FOR MALIGNANT NEOPLASM OF CERVIX: ICD-10-CM

## 2021-01-08 LAB
ALBUMIN SERPL-MCNC: 3.4 G/DL (ref 3.4–5)
ALP SERPL-CCNC: 94 U/L (ref 40–150)
ALT SERPL W P-5'-P-CCNC: 23 U/L (ref 0–50)
ANION GAP SERPL CALCULATED.3IONS-SCNC: 2 MMOL/L (ref 3–14)
AST SERPL W P-5'-P-CCNC: 22 U/L (ref 0–45)
BILIRUB SERPL-MCNC: 0.5 MG/DL (ref 0.2–1.3)
BUN SERPL-MCNC: 13 MG/DL (ref 7–30)
CALCIUM SERPL-MCNC: 9.7 MG/DL (ref 8.5–10.1)
CHLORIDE SERPL-SCNC: 101 MMOL/L (ref 94–109)
CHOLEST SERPL-MCNC: 169 MG/DL
CO2 SERPL-SCNC: 35 MMOL/L (ref 20–32)
CREAT SERPL-MCNC: 0.67 MG/DL (ref 0.52–1.04)
CREAT UR-MCNC: 202 MG/DL
GFR SERPL CREATININE-BSD FRML MDRD: >90 ML/MIN/{1.73_M2}
GLUCOSE SERPL-MCNC: 130 MG/DL (ref 70–99)
HBA1C MFR BLD: 6.6 % (ref 0–5.6)
HDLC SERPL-MCNC: 44 MG/DL
LDLC SERPL CALC-MCNC: 96 MG/DL
MICROALBUMIN UR-MCNC: 24 MG/L
MICROALBUMIN/CREAT UR: 11.93 MG/G CR (ref 0–25)
NONHDLC SERPL-MCNC: 125 MG/DL
POTASSIUM SERPL-SCNC: 3.9 MMOL/L (ref 3.4–5.3)
PROT SERPL-MCNC: 8 G/DL (ref 6.8–8.8)
SODIUM SERPL-SCNC: 138 MMOL/L (ref 133–144)
TRIGL SERPL-MCNC: 145 MG/DL

## 2021-01-08 PROCEDURE — 99396 PREV VISIT EST AGE 40-64: CPT | Mod: 25 | Performed by: FAMILY MEDICINE

## 2021-01-08 PROCEDURE — 99213 OFFICE O/P EST LOW 20 MIN: CPT | Mod: 25 | Performed by: FAMILY MEDICINE

## 2021-01-08 PROCEDURE — 80053 COMPREHEN METABOLIC PANEL: CPT | Performed by: FAMILY MEDICINE

## 2021-01-08 PROCEDURE — 82043 UR ALBUMIN QUANTITATIVE: CPT | Performed by: FAMILY MEDICINE

## 2021-01-08 PROCEDURE — G0145 SCR C/V CYTO,THINLAYER,RESCR: HCPCS | Performed by: FAMILY MEDICINE

## 2021-01-08 PROCEDURE — 83036 HEMOGLOBIN GLYCOSYLATED A1C: CPT | Performed by: FAMILY MEDICINE

## 2021-01-08 PROCEDURE — 80061 LIPID PANEL: CPT | Performed by: FAMILY MEDICINE

## 2021-01-08 PROCEDURE — 99207 PR FOOT EXAM NO CHARGE: CPT | Mod: 25 | Performed by: FAMILY MEDICINE

## 2021-01-08 PROCEDURE — 87624 HPV HI-RISK TYP POOLED RSLT: CPT | Performed by: FAMILY MEDICINE

## 2021-01-08 PROCEDURE — 36415 COLL VENOUS BLD VENIPUNCTURE: CPT | Performed by: FAMILY MEDICINE

## 2021-01-08 PROCEDURE — 90682 RIV4 VACC RECOMBINANT DNA IM: CPT | Performed by: FAMILY MEDICINE

## 2021-01-08 PROCEDURE — 90471 IMMUNIZATION ADMIN: CPT | Performed by: FAMILY MEDICINE

## 2021-01-08 RX ORDER — PREDNISOLONE ACETATE 10 MG/ML
1 SUSPENSION/ DROPS OPHTHALMIC DAILY
COMMUNITY
Start: 2021-01-05 | End: 2023-05-04

## 2021-01-08 ASSESSMENT — ENCOUNTER SYMPTOMS
CONSTIPATION: 0
ABDOMINAL PAIN: 0
DIARRHEA: 0
COUGH: 0
CHILLS: 0
HEMATURIA: 0
HEMATOCHEZIA: 0

## 2021-01-08 ASSESSMENT — MIFFLIN-ST. JEOR: SCORE: 1745.01

## 2021-01-08 NOTE — PATIENT INSTRUCTIONS
Shingles vaccine  I strongly recommend getting the shingles vaccine (Shingrix) if you are over age 50, but please check with your insurance to see how much you might have to pay for this vaccine! If you are on most insurance plans including Medicare, it's covered if you get it at a pharmacy but not in a clinic.    This shot will prevent shingles, a painful skin rash that you are at risk for getting if you have ever had chicken pox. Sometimes the pain will last the rest of your life, even after the rash has healed, and there is not much that we can do to relieve the pain. The vaccine is 98% effective at preventing shingles.    Please consider getting this vaccine! You'll need #2 vaccines 2-4 months apart to be protected.    Preventive Health Recommendations  Female Ages 50 - 64    Yearly exam: See your health care provider every year in order to  o Review health changes.   o Discuss preventive care.    o Review your medicines if your doctor has prescribed any.      Get a Pap test every  5 years..     You do not need a Pap test if your uterus was removed (hysterectomy) and you have not had cancer.    You should be tested each year for STDs (sexually transmitted diseases) if you're at risk.     Have a mammogram every 1 to 2 years.    Have a colonoscopy at age 50, or have a yearly FIT test (stool test). These exams screen for colon cancer.      Have a cholesterol test every 5 years, or more often if advised.    Have a diabetes test (fasting glucose) every three years. If you are at risk for diabetes, you should have this test more often.     If you are at risk for osteoporosis (brittle bone disease), think about having a bone density scan (DEXA).    Shots: Get a flu shot each year. Get a tetanus shot every 10 years.    Nutrition:     Eat at least 5 servings of fruits and vegetables each day.    Eat whole-grain bread, whole-wheat pasta and brown rice instead of white grains and rice.    Get adequate Calcium and Vitamin  D.     Lifestyle    Exercise at least 150 minutes a week (30 minutes a day, 5 days a week). This will help you control your weight and prevent disease.    Limit alcohol to one drink per day.    No smoking.     Wear sunscreen to prevent skin cancer.     See your dentist every six months for an exam and cleaning.    See your eye doctor every 1 to 2 years.

## 2021-01-08 NOTE — PROGRESS NOTES
SUBJECTIVE:   CC: Lesli Jackson is an 60 year old woman who presents for preventive health visit.       Patient has been advised of split billing requirements and indicates understanding: Yes  Healthy Habits:     Getting at least 3 servings of Calcium per day:  Yes    Bi-annual eye exam:  Yes    Dental care twice a year:  Yes    Sleep apnea or symptoms of sleep apnea:  None    Diet:  Carbohydrate counting    Frequency of exercise:  2-3 days/week    Duration of exercise:  15-30 minutes    Taking medications regularly:  Yes    Barriers to taking medications:  None    Medication side effects:  None    PHQ-2 Total Score: 0    Additional concerns today:  No    Diabetes Follow-up      How often are you checking your blood sugar? Not at all    What concerns do you have today about your diabetes? None     Do you have any of these symptoms? (Select all that apply)  No numbness or tingling in feet.  No redness, sores or blisters on feet.  No complaints of excessive thirst.  No reports of blurry vision.  No significant changes to weight.    Have you had a diabetic eye exam in the last 12 months? No        BP Readings from Last 2 Encounters:   01/08/21 138/78   07/19/20 138/81     Hemoglobin A1C (%)   Date Value   11/26/2019 6.7 (H)   11/16/2018 7.7 (H)     LDL Cholesterol Calculated (mg/dL)   Date Value   11/26/2019 98   11/16/2018 65     Hypertension Follow-up      Do you check your blood pressure regularly outside of the clinic? No     Are you following a low salt diet? No    Are your blood pressures ever more than 140 on the top number (systolic) OR more   than 90 on the bottom number (diastolic), for example 140/90? No       Hyperlipidemia Follow-Up      Are you regularly taking any medication or supplement to lower your cholesterol?   Yes- lovastatin (MEVACOR) 20 MG tablet    Are you having muscle aches or other side effects that you think could be caused by your cholesterol lowering medication?  No      Today's PHQ-2  Score:   PHQ-2 ( 1999 Pfizer) 1/8/2021   Q1: Little interest or pleasure in doing things 0   Q2: Feeling down, depressed or hopeless 0   PHQ-2 Score 0   Q1: Little interest or pleasure in doing things Not at all   Q2: Feeling down, depressed or hopeless Not at all   PHQ-2 Score 0       Abuse: Current or Past (Physical, Sexual or Emotional) - No  Do you feel safe in your environment? Yes        Social History     Tobacco Use     Smoking status: Former Smoker     Years: 20.00     Smokeless tobacco: Former User     Tobacco comment: 5 cigarettes day or less   Substance Use Topics     Alcohol use: Yes     Comment: 1-2  beer daily on average     If you drink alcohol do you typically have >3 drinks per day or >7 drinks per week? No    Alcohol Use 1/8/2021   Prescreen: >3 drinks/day or >7 drinks/week? No   Prescreen: >3 drinks/day or >7 drinks/week? -       Reviewed orders with patient.  Reviewed health maintenance and updated orders accordingly - Yes  BP Readings from Last 3 Encounters:   01/08/21 138/78   07/19/20 138/81   07/12/20 (!) 148/91    Wt Readings from Last 3 Encounters:   01/08/21 108.7 kg (239 lb 9.6 oz)   07/12/20 108 kg (238 lb)   11/26/19 112.9 kg (249 lb)                  Patient Active Problem List   Diagnosis     Other specified idiopathic peripheral neuropathy     HYPERLIPIDEMIA LDL GOAL <130     Idiopathic edema     Nevus     Benign neoplasm of skin of trunk, except scrotum     Skin tag     Essential hypertension with goal blood pressure less than 140/90     Swelling of limb     Localized swelling, mass and lump, neck     Tinea corporis     Morbid obesity due to excess calories (H)     Abnormal vaginal bleeding     Type 2 diabetes mellitus with hyperglycemia, without long-term current use of insulin (H)     High blood triglycerides     Advance care planning     Severe obesity (BMI 35.0-35.9 with comorbidity) (H)     Past Surgical History:   Procedure Laterality Date     COLONOSCOPY  2/16/11    repeat 3  yrs, one inconclusive area       Social History     Tobacco Use     Smoking status: Former Smoker     Years: 20.00     Smokeless tobacco: Former User     Tobacco comment: 5 cigarettes day or less   Substance Use Topics     Alcohol use: Yes     Comment: 1-2  beer daily on average     Family History   Problem Relation Age of Onset     C.A.D. Father      Diabetes Father      Cerebrovascular Disease Father         stroke     Eye Disorder Father         cataracts     Heart Disease Father      Hypertension Mother      Arthritis Mother      Cancer Mother         lymphoma     Abdominal Aortic Aneurysm Brother 63        nonsmoker     Prostate Cancer Maternal Uncle      Allergies Sister      Eye Disorder Other         iritis     Lipids Other         one of her parents     Thyroid Disease Sister          Current Outpatient Medications   Medication Sig Dispense Refill     carvedilol (COREG) 6.25 MG tablet TAKE 1 TABLET(6.25 MG) BY MOUTH TWICE DAILY WITH MEALS 180 tablet 3     clotrimazole (LOTRIMIN) 1 % cream Apply 1 ml to area twice daily 15 g 3     Flaxseed, Linseed, (FLAXSEED OIL) 1000 MG CAPS Take 2 capsules by mouth daily. 90 capsule 0     hydrochlorothiazide (HYDRODIURIL) 50 MG tablet TAKE ONE-HALF TABLET BY MOUTH EVERY DAY 45 tablet 1     lisinopril (ZESTRIL) 40 MG tablet TAKE 1 AND 1/2 TABLETS BY MOUTH EVERY  tablet 3     lovastatin (MEVACOR) 20 MG tablet TAKE 1/2 TABLET BY MOUTH EVERY NIGHT AT BEDTIME 45 tablet 3     MULTIPLE VIT-MIN-CALCIUM-FA PO Take  by mouth.       prednisoLONE acetate (PRED FORTE) 1 % ophthalmic suspension Apply 1 drop to eye daily In the left eye only       Allergies   Allergen Reactions     Amoxicillin Rash     Recent Labs   Lab Test 01/08/21  1029 11/26/19  0912 11/16/18  0922 09/08/17  1026   A1C 6.6* 6.7* 7.7* 7.7*   LDL  --  98 65 75   HDL  --  44* 39* 39*   TRIG  --  165* 306* 302*   ALT  --  25 27 26   CR  --  0.69 0.70 0.70   GFRESTIMATED  --  >90 86 86   GFRESTBLACK  --  >90 >90  >90   POTASSIUM  --  3.7 3.4 3.8   TSH  --  3.97 2.97  --         Mammogram Screening: Patient over age 50, mutual decision to screen reflected in health maintenance.    Pertinent mammograms are reviewed under the imaging tab.  History of abnormal Pap smear:   NO - age 30-65 PAP every 5 years with negative HPV co-testing recommended  Last 3 Pap Results:   PAP (no units)   Date Value   07/08/2016 NIL   04/22/2013 NIL   06/15/2010 NIL     PAP / HPV Latest Ref Rng & Units 7/8/2016 4/22/2013 6/15/2010   PAP - NIL NIL NIL   HPV 16 DNA NEG Negative - -   HPV 18 DNA NEG Negative - -   OTHER HR HPV NEG Negative - -     Reviewed and updated as needed this visit by clinical staff  Tobacco  Allergies  Meds   Med Hx  Surg Hx  Fam Hx  Soc Hx        Reviewed and updated as needed this visit by Provider  Tobacco  Allergies      Fam Hx         Past Medical History:   Diagnosis Date     Baker's cyst of knee     Lt (7.3x4.3x1.6 cm)     Elevated glucose      Hyperlipidemia LDL goal <130      Hypertension goal BP (blood pressure) < 140/90      Idiopathic edema      NONSPECIFIC MEDICAL HISTORY     nephrolithiasis     Personal history of smoking     quit in 2010     Secondary lymphedema 8/4/19    DX by [facility] [place], lose wt. at this point     Type 2 diabetes, HbA1c goal < 7% (H)      Unspecified iridocyclitis       Past Surgical History:   Procedure Laterality Date     COLONOSCOPY  2/16/11    repeat 3 yrs, one inconclusive area       Review of Systems   Constitutional: Negative for chills.   HENT: Negative for congestion.    Respiratory: Negative for cough.    Cardiovascular: Negative for chest pain.   Gastrointestinal: Negative for abdominal pain, constipation, diarrhea and hematochezia.   Genitourinary: Negative for hematuria.     Family History   Problem Relation Age of Onset     C.A.D. Father      Diabetes Father      Cerebrovascular Disease Father         stroke     Eye Disorder Father         cataracts     Heart  "Disease Father      Hypertension Mother      Arthritis Mother      Cancer Mother         lymphoma     Abdominal Aortic Aneurysm Brother 63        nonsmoker     Prostate Cancer Maternal Uncle      Allergies Sister      Eye Disorder Other         iritis     Lipids Other         one of her parents     Thyroid Disease Sister          OBJECTIVE:   /78   Pulse 79   Temp 97.6  F (36.4  C) (Tympanic)   Resp 16   Ht 1.791 m (5' 10.5\")   Wt 108.7 kg (239 lb 9.6 oz)   LMP 12/22/2012   SpO2 98%   BMI 33.89 kg/m     BP Readings from Last 3 Encounters:   01/08/21 138/78   07/19/20 138/81   07/12/20 (!) 148/91      Physical Exam  GENERAL APPEARANCE: healthy, alert and no distress  EYES: Eyes grossly normal to inspection, PERRL and conjunctivae and sclerae normal  HENT: ear canals and TM's normal, nose and mouth without ulcers or lesions, oropharynx clear and oral mucous membranes moist  NECK: no adenopathy, no asymmetry, masses, or scars and thyroid normal to palpation  RESP: lungs clear to auscultation - no rales, rhonchi or wheezes  BREAST: normal without masses, tenderness or nipple discharge and no palpable axillary masses or adenopathy  CV: regular rate and rhythm, normal S1 S2, no S3 or S4, no murmur, click or rub, no peripheral edema and peripheral pulses strong  ABDOMEN: soft, nontender, no hepatosplenomegaly, no masses and bowel sounds normal   (female): normal female external genitalia, normal urethral meatus, vaginal mucosal atrophy noted, normal cervix, adnexae, and uterus without masses or abnormal discharge  MS: no musculoskeletal defects are noted and gait is age appropriate without ataxia  SKIN: no suspicious lesions or rashes  NEURO: Normal strength and tone, sensory exam grossly normal, mentation intact and speech normal  PSYCH: mentation appears normal and affect normal/bright    The 10-year ASCVD risk score ([place] STRICKLAND Jr., et al., 2013) is: 10.2%    Values used to calculate the score:      Age: 60 " years      Sex: Female      Is Non- : No      Diabetic: Yes      Tobacco smoker: No      Systolic Blood Pressure: 138 mmHg      Is BP treated: Yes      HDL Cholesterol: 44 mg/dL      Total Cholesterol: 175 mg/dL     ASSESSMENT/PLAN:   1. Routine general medical examination at a health care facility  routine  - Lipid panel reflex to direct LDL Fasting  - HPV High Risk Types DNA Cervical    2. Screening for malignant neoplasm of cervix    3. Hyperlipidemia LDL goal <130  LDL Cholesterol Calculated   Date Value Ref Range Status   11/26/2019 98 <100 mg/dL Final     Comment:     Desirable:       <100 mg/dl    at goal  - Comprehensive metabolic panel    4. Essential hypertension with goal blood pressure less than 140/90  BP Readings from Last 3 Encounters:   01/08/21 138/78   07/19/20 138/81   07/12/20 (!) 148/91      At goal  The current medical regimen is effective;  continue present plan and medications.    - Albumin Random Urine Quantitative with Creat Ratio  - Comprehensive metabolic panel    5. Type 2 diabetes mellitus with hyperglycemia, without long-term current use of insulin (H)  She does not feel diagnosis is accurate and would like this removed; note A1C has improved dramatically over time but still does just meet criteria for DM2, encourage lifestyle changes, monitor, adjust diagnosis as indicated  - HEMOGLOBIN A1C  - Albumin Random Urine Quantitative with Creat Ratio  - FOOT EXAM  - Comprehensive metabolic panel    6. High blood triglycerides  Triglycerides   Date Value Ref Range Status   11/26/2019 165 (H) <150 mg/dL Final     Comment:     Borderline high:  150-199 mg/dl  High:             200-499 mg/dl  Very high:       >499 mg/dl  Fasting specimen       - Lipid panel reflex to direct LDL Fasting  - Comprehensive metabolic panel    7. Need for prophylactic vaccination and inoculation against influenza  - INFLUENZA QUAD, RECOMBINANT, P-FREE (RIV4) (FLUBLOCK)  "[[id number]]    COUNSELING:  Reviewed preventive health counseling, as reflected in patient instructions    Estimated body mass index is 33.89 kg/m  as calculated from the following:    Height as of this encounter: 1.791 m (5' 10.5\").    Weight as of this encounter: 108.7 kg (239 lb 9.6 oz).    Weight management plan: Discussed healthy diet and exercise guidelines    She reports that she has quit smoking. She quit after 20.00 years of use. She has quit using smokeless tobacco.      Counseling Resources:  ATP IV Guidelines  Pooled Cohorts Equation Calculator  Breast Cancer Risk Calculator  BRCA-Related Cancer Risk Assessment: FHS-7 Tool  FRAX Risk Assessment  ICSI Preventive Guidelines  Dietary Guidelines for Americans, 2010  USDA's MyPlate  ASA Prophylaxis  Lung CA Screening    Gretchen Vargas MD  [facility]  "

## 2021-01-12 LAB
COPATH REPORT: NORMAL
PAP: NORMAL

## 2021-01-14 LAB
FINAL DIAGNOSIS: NORMAL
HPV HR 12 DNA CVX QL NAA+PROBE: NEGATIVE
HPV16 DNA SPEC QL NAA+PROBE: NEGATIVE
HPV18 DNA SPEC QL NAA+PROBE: NEGATIVE
SPECIMEN DESCRIPTION: NORMAL
SPECIMEN SOURCE CVX/VAG CYTO: NORMAL

## 2021-01-19 NOTE — RESULT ENCOUNTER NOTE
Hello!  It was a pleasure to see you in clinic!  Thank you for getting labs done.     Your microalbumen is normal, which is great news. This means you do not have protein in the urine, and that your kidneys are currently functioning well. Keeping blood pressure and blood fats low is the best way to preserve your kidney function over your lifetime.     Good news, your LDL (or bad cholesterol) is at goal. Your medication is working well. Please continue to take your cholesterol lowering medication is you have been doing (Mevacor).    The testing of your blood sugar, kidney function, liver function and electrolytes was normal.     Your HgA1C, also called glycosylated hemoglobin, which measures the level of sugar in your blood over the past few months, is at goal, which is great! Congratulations!     Sincerely,  Dr. Gretchen Vargas MD  1/19/2021

## 2021-02-06 DIAGNOSIS — I10 ESSENTIAL HYPERTENSION WITH GOAL BLOOD PRESSURE LESS THAN 140/90: ICD-10-CM

## 2021-02-09 RX ORDER — HYDROCHLOROTHIAZIDE 50 MG/1
TABLET ORAL
Qty: 45 TABLET | Refills: 2 | Status: SHIPPED | OUTPATIENT
Start: 2021-02-09 | End: 2021-11-14

## 2021-02-09 NOTE — TELEPHONE ENCOUNTER
Prescription approved per [facility] Refill Protocol.  DARLEEN MadridN, RN  [facility]

## 2021-03-12 DIAGNOSIS — Z12.11 SCREEN FOR COLON CANCER: Primary | ICD-10-CM

## 2021-03-24 ENCOUNTER — IMMUNIZATION (OUTPATIENT)
Dept: NURSING | Facility: CLINIC | Age: 61
End: 2021-03-24
Payer: COMMERCIAL

## 2021-03-24 LAB — COLOGUARD-ABSTRACT: NEGATIVE

## 2021-03-24 PROCEDURE — 91300 PR COVID VAC PFIZER DIL RECON 30 MCG/0.3 ML IM: CPT

## 2021-03-24 PROCEDURE — 0001A PR COVID VAC PFIZER DIL RECON 30 MCG/0.3 ML IM: CPT

## 2021-04-14 ENCOUNTER — OFFICE VISIT (OUTPATIENT)
Dept: NURSING | Facility: CLINIC | Age: 61
End: 2021-04-14
Attending: INTERNAL MEDICINE
Payer: COMMERCIAL

## 2021-04-14 PROCEDURE — 0002A PR COVID VAC PFIZER DIL RECON 30 MCG/0.3 ML IM: CPT

## 2021-04-14 PROCEDURE — 91300 PR COVID VAC PFIZER DIL RECON 30 MCG/0.3 ML IM: CPT

## 2021-08-29 ENCOUNTER — HEALTH MAINTENANCE LETTER (OUTPATIENT)
Age: 61
End: 2021-08-29

## 2021-09-01 ENCOUNTER — TRANSFERRED RECORDS (OUTPATIENT)
Dept: MULTI SPECIALTY CLINIC | Facility: CLINIC | Age: 61
End: 2021-09-01
Payer: COMMERCIAL

## 2021-09-01 LAB — RETINOPATHY: NORMAL

## 2021-10-24 ENCOUNTER — HEALTH MAINTENANCE LETTER (OUTPATIENT)
Age: 61
End: 2021-10-24

## 2021-11-14 DIAGNOSIS — I10 ESSENTIAL HYPERTENSION WITH GOAL BLOOD PRESSURE LESS THAN 140/90: ICD-10-CM

## 2021-11-17 RX ORDER — HYDROCHLOROTHIAZIDE 50 MG/1
25 TABLET ORAL DAILY
Qty: 45 TABLET | Refills: 0 | Status: SHIPPED | OUTPATIENT
Start: 2021-11-17 | End: 2022-02-01

## 2021-11-17 NOTE — TELEPHONE ENCOUNTER
1 refill approved needs yearly visit 1/2022, please call and schedule.    Thank you    Diuretics (Including Combos) Protocol Passed 11/14/2021 04:22 PM   Protocol Details  Blood pressure under 140/90 in past 12 months    Recent (12 mo) or future (30 days) visit within the authorizing provider's specialty    Medication is active on med list    Patient is age 18 or older    No active pregancy on record    Normal serum creatinine on file in past 12 months    Normal serum potassium on file in past 12 months    Normal serum sodium on file in past 12 months    No positive pregnancy test in past 12 months

## 2021-11-22 ENCOUNTER — IMMUNIZATION (OUTPATIENT)
Dept: FAMILY MEDICINE | Facility: CLINIC | Age: 61
End: 2021-11-22
Payer: COMMERCIAL

## 2021-11-22 DIAGNOSIS — Z23 HIGH PRIORITY FOR 2019-NCOV VACCINE: Primary | ICD-10-CM

## 2021-11-22 PROCEDURE — 90682 RIV4 VACC RECOMBINANT DNA IM: CPT

## 2021-11-22 PROCEDURE — 91300 COVID-19,PF,PFIZER (12+ YRS): CPT

## 2021-11-22 PROCEDURE — 90471 IMMUNIZATION ADMIN: CPT

## 2021-11-22 PROCEDURE — 0004A COVID-19,PF,PFIZER (12+ YRS): CPT

## 2021-12-22 DIAGNOSIS — I10 ESSENTIAL HYPERTENSION WITH GOAL BLOOD PRESSURE LESS THAN 140/90: ICD-10-CM

## 2021-12-22 DIAGNOSIS — E78.5 HYPERLIPIDEMIA LDL GOAL <130: ICD-10-CM

## 2021-12-22 RX ORDER — LOVASTATIN 20 MG
TABLET ORAL
Qty: 45 TABLET | Refills: 3 | Status: SHIPPED | OUTPATIENT
Start: 2021-12-22 | End: 2022-10-11

## 2021-12-22 RX ORDER — CARVEDILOL 6.25 MG/1
TABLET ORAL
Qty: 180 TABLET | Refills: 3 | Status: SHIPPED | OUTPATIENT
Start: 2021-12-22 | End: 2022-10-11

## 2021-12-22 NOTE — TELEPHONE ENCOUNTER
Patient scheduled annual physical on 03/10/21. This is her providers first available. Can she get a refill of these medication to get her to her appointment. She is all out of the Carvedilol and needs more ASAP. She missed a dose. Please sent do the [facility] on file.

## 2022-02-01 DIAGNOSIS — I10 ESSENTIAL HYPERTENSION WITH GOAL BLOOD PRESSURE LESS THAN 140/90: ICD-10-CM

## 2022-02-01 RX ORDER — HYDROCHLOROTHIAZIDE 50 MG/1
25 TABLET ORAL DAILY
Qty: 45 TABLET | Refills: 0 | Status: SHIPPED | OUTPATIENT
Start: 2022-02-01 | End: 2022-03-10

## 2022-02-01 NOTE — TELEPHONE ENCOUNTER
Providers-Please review, sign if agree and may close encounter. Patient scheduled with Dr. Vargas on 3/10/22.    Routing refill request to provider for review/approval because:  Laurie given x1 and patient did not follow up, please advise    Last Written Prescription Date:  11/17/21  Last Fill Quantity: 45,  # refills: 0   Last office visit: 1/8/2021 with prescribing provider:  Dr. Vargas   Future Office Visit:   Next 5 appointments (look out 90 days)    Mar 10, 2022  9:50 AM  (Arrive by 9:30 AM)  Adult Preventative Visit with Gretchen Vargas MD  [facility] [address] [place] [address] [phone]           Thank you!  DEVON Bledsoe BSN, RN  [facility]

## 2022-02-13 ENCOUNTER — HEALTH MAINTENANCE LETTER (OUTPATIENT)
Age: 62
End: 2022-02-13

## 2022-02-14 ENCOUNTER — MYC MEDICAL ADVICE (OUTPATIENT)
Dept: FAMILY MEDICINE | Facility: CLINIC | Age: 62
End: 2022-02-14
Payer: COMMERCIAL

## 2022-03-10 ENCOUNTER — OFFICE VISIT (OUTPATIENT)
Dept: FAMILY MEDICINE | Facility: CLINIC | Age: 62
End: 2022-03-10
Payer: COMMERCIAL

## 2022-03-10 VITALS
DIASTOLIC BLOOD PRESSURE: 84 MMHG | TEMPERATURE: 97.8 F | WEIGHT: 250 LBS | SYSTOLIC BLOOD PRESSURE: 136 MMHG | RESPIRATION RATE: 18 BRPM | HEART RATE: 67 BPM | HEIGHT: 72 IN | OXYGEN SATURATION: 94 % | BODY MASS INDEX: 33.86 KG/M2

## 2022-03-10 DIAGNOSIS — E78.1 HIGH BLOOD TRIGLYCERIDES: ICD-10-CM

## 2022-03-10 DIAGNOSIS — I10 ESSENTIAL HYPERTENSION WITH GOAL BLOOD PRESSURE LESS THAN 140/90: ICD-10-CM

## 2022-03-10 DIAGNOSIS — M25.50 MULTIPLE JOINT PAIN: ICD-10-CM

## 2022-03-10 DIAGNOSIS — E78.5 HYPERLIPIDEMIA LDL GOAL <130: ICD-10-CM

## 2022-03-10 DIAGNOSIS — E11.65 TYPE 2 DIABETES MELLITUS WITH HYPERGLYCEMIA, WITHOUT LONG-TERM CURRENT USE OF INSULIN (H): Primary | ICD-10-CM

## 2022-03-10 DIAGNOSIS — Z12.31 VISIT FOR SCREENING MAMMOGRAM: ICD-10-CM

## 2022-03-10 DIAGNOSIS — E66.01 SEVERE OBESITY (BMI 35.0-35.9 WITH COMORBIDITY) (H): ICD-10-CM

## 2022-03-10 LAB
ALBUMIN SERPL-MCNC: 3.2 G/DL (ref 3.4–5)
ALP SERPL-CCNC: 80 U/L (ref 40–150)
ALT SERPL W P-5'-P-CCNC: 25 U/L (ref 0–50)
ANION GAP SERPL CALCULATED.3IONS-SCNC: 4 MMOL/L (ref 3–14)
AST SERPL W P-5'-P-CCNC: 20 U/L (ref 0–45)
BILIRUB SERPL-MCNC: 0.6 MG/DL (ref 0.2–1.3)
BUN SERPL-MCNC: 17 MG/DL (ref 7–30)
CALCIUM SERPL-MCNC: 9.6 MG/DL (ref 8.5–10.1)
CHLORIDE BLD-SCNC: 101 MMOL/L (ref 94–109)
CHOLEST SERPL-MCNC: 155 MG/DL
CO2 SERPL-SCNC: 33 MMOL/L (ref 20–32)
CREAT SERPL-MCNC: 0.73 MG/DL (ref 0.52–1.04)
FASTING STATUS PATIENT QL REPORTED: ABNORMAL
GFR SERPL CREATININE-BSD FRML MDRD: >90 ML/MIN/1.73M2
GLUCOSE BLD-MCNC: 147 MG/DL (ref 70–99)
HBA1C MFR BLD: 6.9 % (ref 0–5.6)
HDLC SERPL-MCNC: 46 MG/DL
LDLC SERPL CALC-MCNC: 73 MG/DL
NONHDLC SERPL-MCNC: 109 MG/DL
POTASSIUM BLD-SCNC: 4.1 MMOL/L (ref 3.4–5.3)
PROT SERPL-MCNC: 7.9 G/DL (ref 6.8–8.8)
SODIUM SERPL-SCNC: 138 MMOL/L (ref 133–144)
TRIGL SERPL-MCNC: 179 MG/DL

## 2022-03-10 PROCEDURE — 83036 HEMOGLOBIN GLYCOSYLATED A1C: CPT | Performed by: FAMILY MEDICINE

## 2022-03-10 PROCEDURE — 99214 OFFICE O/P EST MOD 30 MIN: CPT | Performed by: FAMILY MEDICINE

## 2022-03-10 PROCEDURE — 99207 PR FOOT EXAM NO CHARGE: CPT | Performed by: FAMILY MEDICINE

## 2022-03-10 PROCEDURE — 80053 COMPREHEN METABOLIC PANEL: CPT | Performed by: FAMILY MEDICINE

## 2022-03-10 PROCEDURE — 80061 LIPID PANEL: CPT | Performed by: FAMILY MEDICINE

## 2022-03-10 PROCEDURE — 36415 COLL VENOUS BLD VENIPUNCTURE: CPT | Performed by: FAMILY MEDICINE

## 2022-03-10 PROCEDURE — 82043 UR ALBUMIN QUANTITATIVE: CPT | Performed by: FAMILY MEDICINE

## 2022-03-10 RX ORDER — HYDROCHLOROTHIAZIDE 50 MG/1
25 TABLET ORAL DAILY
Qty: 45 TABLET | Refills: 3 | Status: SHIPPED | OUTPATIENT
Start: 2022-03-10 | End: 2023-04-10

## 2022-03-10 ASSESSMENT — ENCOUNTER SYMPTOMS
FEVER: 0
EYE PAIN: 0
JOINT SWELLING: 0
CHILLS: 0
CONSTIPATION: 0
HEARTBURN: 0
NERVOUS/ANXIOUS: 0
DYSURIA: 0
DIZZINESS: 0
HEMATURIA: 0
FREQUENCY: 0
MYALGIAS: 0
DIARRHEA: 0
NAUSEA: 0
ABDOMINAL PAIN: 0
SORE THROAT: 0
HEADACHES: 0
SHORTNESS OF BREATH: 0
PARESTHESIAS: 0
PALPITATIONS: 0
COUGH: 0
WEAKNESS: 0
HEMATOCHEZIA: 0
ARTHRALGIAS: 1

## 2022-03-10 NOTE — PROGRESS NOTES
Assessment & Plan     (E11.65) Type 2 diabetes mellitus with hyperglycemia, without long-term current use of insulin (H)  (primary encounter diagnosis)  Comment: at goal  She does not identify with this diagnosis  She does plan to increased activity to reduce weight, blood sugars and BP  Plan: Comprehensive metabolic panel, [place] FOOT EXAM NO         CHARGE, Albumin Random Urine Quantitative with         Creat Ratio, Hemoglobin A1c            (I10) Essential hypertension with goal blood pressure less than 140/90  Comment: At goal  The current medical regimen is effective;  continue present plan and medications.    Plan: Comprehensive metabolic panel, Albumin Random         Urine Quantitative with Creat Ratio,         hydrochlorothiazide (HYDRODIURIL) 50 MG tablet            (E78.5) Hyperlipidemia LDL goal <130  Comment:   LDL Cholesterol Calculated   Date Value Ref Range Status   01/08/2021 96 <100 mg/dL Final     Comment:     Desirable:       <100 mg/dl    At goal  The current medical regimen is effective;  continue present plan and medications.    Plan: Lipid panel reflex to direct LDL Fasting,         Comprehensive metabolic panel          Triglycerides   Date Value Ref Range Status   01/08/2021 145 <150 mg/dL Final     Comment:     Fasting specimen     At goal  The current medical regimen is effective;  continue present plan and medications.      (E78.1) High blood triglycerides  Comment:   Plan: Lipid panel reflex to direct LDL Fasting,         Comprehensive metabolic panel            (Z12.31) Visit for screening mammogram  Comment:   Plan: MA SCREENING DIGITAL BILAT - Future  (s+30)            (M25.50) Multiple joint pain  Comment:   Plan: Rheumatoid factor        Both hands have achy finger joints, no clear morning or evening pattern of pain, consider additional work up as above to differential OA vs RA    BMI:   Estimated body mass index is 34.23 kg/m  as calculated from the following:    Height as of this  "encounter: 1.82 m (5' 11.65\").    Weight as of this encounter: 113.4 kg (250 lb).   Weight management plan: Discussed healthy diet and exercise guidelines she plans to increase daily activity      Return in about 1 year (around 3/10/2023) for preventive visit (wellness/annual physical exam), chronic disease management.    Gretchen Vargas MD  [facility] MILO Ballesteros is a 61 year old who presents for the following health issues     Healthy Habits:     Getting at least 3 servings of Calcium per day:  Yes    Bi-annual eye exam:  Yes    Dental care twice a year:  Yes    Sleep apnea or symptoms of sleep apnea:  None    Diet:  Vegetarian/vegan    Frequency of exercise:  2-3 days/week    Duration of exercise:  15-30 minutes    Taking medications regularly:  Yes    Barriers to taking medications:  None    Medication side effects:  Not applicable    PHQ-2 Total Score: 0    Additional concerns today:  No       Diabetes Follow-up      How often are you checking your blood sugar? Not at all    What concerns do you have today about your diabetes? None     Do you have any of these symptoms? (Select all that apply)  No numbness or tingling in feet.  No redness, sores or blisters on feet.  No complaints of excessive thirst.  No reports of blurry vision.  No significant changes to weight.    Have you had a diabetic eye exam in the last 12 months? Yes- Date of last eye exam: 9/2021,  Location: [facility]      Hyperlipidemia Follow-Up      Are you regularly taking any medication or supplement to lower your cholesterol?   Yes- lovastatin 20 mg daily    Are you having muscle aches or other side effects that you think could be caused by your cholesterol lowering medication?  No    Hypertension Follow-up      Do you check your blood pressure regularly outside of the clinic? No     Are you following a low salt diet? Yes    Are your blood pressures ever more than 140 on the top number " "(systolic) OR more   than 90 on the bottom number (diastolic), for example 140/90? No    BP Readings from Last 2 Encounters:   03/10/22 136/84   01/08/21 138/78     Hemoglobin A1C POCT (%)   Date Value   01/08/2021 6.6 (H)   11/26/2019 6.7 (H)     Hemoglobin A1C (%)   Date Value   03/10/2022 6.9 (H)     LDL Cholesterol Calculated (mg/dL)   Date Value   01/08/2021 96   11/26/2019 98         Review of Systems   Constitutional: Negative for chills and fever.   HENT: Negative for congestion, ear pain, hearing loss and sore throat.    Eyes: Negative for pain and visual disturbance.   Respiratory: Negative for cough and shortness of breath.    Cardiovascular: Negative for chest pain, palpitations and peripheral edema.   Gastrointestinal: Negative for abdominal pain, constipation, diarrhea, heartburn, hematochezia and nausea.   Genitourinary: Positive for vaginal bleeding. Negative for dysuria, frequency, genital sores, hematuria and urgency.   Musculoskeletal: Positive for arthralgias. Negative for joint swelling and myalgias.   Skin: Negative for rash.   Neurological: Negative for dizziness, weakness, headaches and paresthesias.   Psychiatric/Behavioral: Negative for mood changes. The patient is not nervous/anxious.             Objective    /84 (BP Location: Left arm, Patient Position: Sitting, Cuff Size: Adult Large)   Pulse 67   Temp 97.8  F (36.6  C) (Temporal)   Resp 18   Ht 1.82 m (5' 11.65\")   Wt 113.4 kg (250 lb)   LMP 12/22/2012   SpO2 94%   BMI 34.23 kg/m    Body mass index is 34.23 kg/m .   Wt Readings from Last 4 Encounters:   03/10/22 113.4 kg (250 lb)   01/08/21 108.7 kg (239 lb 9.6 oz)   07/12/20 108 kg (238 lb)   11/26/19 112.9 kg (249 lb)       Physical Exam   GENERAL: healthy, alert and no distress  EYES: Eyes grossly normal to inspection, PERRL and conjunctivae and sclerae normal  HENT: ear canals and TM's normal, nose and mouth without ulcers or lesions  NECK: no adenopathy, no asymmetry, " masses, or scars and thyroid normal to palpation  RESP: lungs clear to auscultation - no rales, rhonchi or wheezes  CV: regular rate and rhythm, normal S1 S2, no S3 or S4, no murmur, click or rub, no peripheral edema and peripheral pulses strong  ABDOMEN: soft, nontender, no hepatosplenomegaly, no masses and bowel sounds normal  MS: no gross musculoskeletal defects noted, no edema  SKIN: no suspicious lesions or rashes  NEURO: Normal strength and tone, mentation intact and speech normal  BACK: no CVA tenderness, no paralumbar tenderness  PSYCH: mentation appears normal, affect normal/bright  Diabetic foot exam: normal DP and PT pulses, no trophic changes or ulcerative lesions and normal sensory exam

## 2022-03-10 NOTE — PATIENT INSTRUCTIONS
Results for orders placed or performed in visit on 03/10/22   Hemoglobin A1c     Status: Abnormal   Result Value Ref Range    Hemoglobin A1C 6.9 (H) 0.0 - 5.6 %      Mammogram due!  You can call any of the centers below to schedule your mammogram.  1.  Mamm[facility]  Appointment line [phone]  2   [facility]   Appointment line [phone]   3.   [facility]   Appointment line [phone]      Shingles vaccine  I strongly recommend getting the shingles vaccine (Shingrix) if you are over age 50, but please check with your insurance to see how much you might have to pay for this vaccine! If you are on most insurance plans including Medicare, it's covered if you get it at a pharmacy but not in a clinic.    This shot will prevent shingles, a painful skin rash that you are at risk for getting if you have ever had chicken pox. Sometimes the pain will last the rest of your life, even after the rash has healed, and there is not much that we can do to relieve the pain. The vaccine is 98% effective at preventing shingles.    Please consider getting this vaccine! You'll need #2 vaccines 2-4 months apart to be protected.

## 2022-03-10 NOTE — RESULT ENCOUNTER NOTE
Patient was seen today in clinic.  I discussed results in clinic, please see clinic progress note.    Gretchen Vargas MD 3/10/2022

## 2022-03-11 LAB
CREAT UR-MCNC: 214 MG/DL
MICROALBUMIN UR-MCNC: 32 MG/L
MICROALBUMIN/CREAT UR: 14.95 MG/G CR (ref 0–25)

## 2022-03-18 NOTE — RESULT ENCOUNTER NOTE
Thank you for getting labs done.     Your microalbumen is normal, which is great news. This means you do not have protein in the urine, and that your kidneys are currently functioning well. Keeping blood pressure and blood fats low is the best way to preserve your kidney function over your lifetime.     Good news, your LDL (or bad cholesterol) is at goal. Your medication is working well. Please continue to take your cholesterol lowering medication lovastatin as you have been doing.    The testing of your blood sugar, kidney function, liver function and electrolytes was normal.     Your HgA1C, also called glycosylated hemoglobin, which measures the level of sugar in your blood over the past few months, is at goal, which is great! Congratulations!    Strong work!    If you have any questions, please contact the clinic or schedule an appointment with me, thank you!      Sincerely,  Dr. Gretchen Vargas MD  3/17/2022

## 2022-05-24 DIAGNOSIS — I10 ESSENTIAL HYPERTENSION WITH GOAL BLOOD PRESSURE LESS THAN 140/90: ICD-10-CM

## 2022-05-25 RX ORDER — HYDROCHLOROTHIAZIDE 50 MG/1
TABLET ORAL
Qty: 0.1 TABLET | Refills: 0 | OUTPATIENT
Start: 2022-05-25

## 2022-05-25 NOTE — TELEPHONE ENCOUNTER
Duplicate see script sent 3/10/2022 hydrochlorothiazide (HYDRODIURIL) 50 MG tablet # 45 tablet x 3 refills

## 2022-07-13 DIAGNOSIS — I10 ESSENTIAL HYPERTENSION WITH GOAL BLOOD PRESSURE LESS THAN 140/90: ICD-10-CM

## 2022-07-15 RX ORDER — LISINOPRIL 40 MG/1
TABLET ORAL
Qty: 135 TABLET | Refills: 0 | Status: SHIPPED | OUTPATIENT
Start: 2022-07-15 | End: 2022-10-11

## 2022-07-15 NOTE — TELEPHONE ENCOUNTER
Prescription approved per [facility] Refill Protocol.  DEVON Lane RN  [facility]

## 2022-10-15 ENCOUNTER — HEALTH MAINTENANCE LETTER (OUTPATIENT)
Age: 62
End: 2022-10-15

## 2022-12-22 ENCOUNTER — E-VISIT (OUTPATIENT)
Dept: FAMILY MEDICINE | Facility: CLINIC | Age: 62
End: 2022-12-22
Payer: COMMERCIAL

## 2022-12-22 DIAGNOSIS — N93.9 ABNORMAL VAGINAL BLEEDING: Primary | ICD-10-CM

## 2022-12-22 PROCEDURE — 99421 OL DIG E/M SVC 5-10 MIN: CPT | Performed by: STUDENT IN AN ORGANIZED HEALTH CARE EDUCATION/TRAINING PROGRAM

## 2023-01-09 DIAGNOSIS — I10 ESSENTIAL HYPERTENSION WITH GOAL BLOOD PRESSURE LESS THAN 140/90: ICD-10-CM

## 2023-01-09 DIAGNOSIS — E78.5 HYPERLIPIDEMIA LDL GOAL <130: ICD-10-CM

## 2023-01-11 RX ORDER — CARVEDILOL 6.25 MG/1
6.25 TABLET ORAL 2 TIMES DAILY WITH MEALS
Qty: 60 TABLET | Refills: 0 | Status: SHIPPED | OUTPATIENT
Start: 2023-01-11 | End: 2023-02-09

## 2023-01-11 RX ORDER — LISINOPRIL 40 MG/1
60 TABLET ORAL DAILY
Qty: 45 TABLET | Refills: 0 | Status: SHIPPED | OUTPATIENT
Start: 2023-01-11 | End: 2023-02-09

## 2023-01-11 RX ORDER — LOVASTATIN 20 MG
10 TABLET ORAL AT BEDTIME
Qty: 15 TABLET | Refills: 0 | Status: SHIPPED | OUTPATIENT
Start: 2023-01-11 | End: 2023-02-09

## 2023-03-26 ENCOUNTER — HEALTH MAINTENANCE LETTER (OUTPATIENT)
Age: 63
End: 2023-03-26

## 2023-04-09 DIAGNOSIS — I10 ESSENTIAL HYPERTENSION WITH GOAL BLOOD PRESSURE LESS THAN 140/90: ICD-10-CM

## 2023-04-10 RX ORDER — HYDROCHLOROTHIAZIDE 50 MG/1
25 TABLET ORAL DAILY
Qty: 45 TABLET | Refills: 0 | Status: SHIPPED | OUTPATIENT
Start: 2023-04-10 | End: 2023-07-11

## 2023-04-10 NOTE — TELEPHONE ENCOUNTER
LVM, regarding med refill. Advice pt to call and schedule an annual physical appt for further refill need.

## 2023-04-10 NOTE — TELEPHONE ENCOUNTER
.Please call patient to schedule an annual appointment.  Appointment needed for further refills.  Daiana Del Real RN  [facility]

## 2023-05-05 ENCOUNTER — OFFICE VISIT (OUTPATIENT)
Dept: FAMILY MEDICINE | Facility: CLINIC | Age: 63
End: 2023-05-05
Payer: COMMERCIAL

## 2023-05-05 VITALS
WEIGHT: 262 LBS | SYSTOLIC BLOOD PRESSURE: 145 MMHG | TEMPERATURE: 97.8 F | RESPIRATION RATE: 15 BRPM | BODY MASS INDEX: 37.51 KG/M2 | HEART RATE: 67 BPM | OXYGEN SATURATION: 94 % | DIASTOLIC BLOOD PRESSURE: 88 MMHG | HEIGHT: 70 IN

## 2023-05-05 DIAGNOSIS — G62.9 NEUROPATHY: ICD-10-CM

## 2023-05-05 DIAGNOSIS — Z12.31 VISIT FOR SCREENING MAMMOGRAM: ICD-10-CM

## 2023-05-05 DIAGNOSIS — E11.65 TYPE 2 DIABETES MELLITUS WITH HYPERGLYCEMIA, WITHOUT LONG-TERM CURRENT USE OF INSULIN (H): ICD-10-CM

## 2023-05-05 DIAGNOSIS — Z00.00 ROUTINE GENERAL MEDICAL EXAMINATION AT A HEALTH CARE FACILITY: Primary | ICD-10-CM

## 2023-05-05 DIAGNOSIS — E78.5 HYPERLIPIDEMIA LDL GOAL <130: ICD-10-CM

## 2023-05-05 DIAGNOSIS — I10 ESSENTIAL HYPERTENSION WITH GOAL BLOOD PRESSURE LESS THAN 140/90: ICD-10-CM

## 2023-05-05 LAB
ALBUMIN SERPL BCG-MCNC: 4 G/DL (ref 3.5–5.2)
ALP SERPL-CCNC: 74 U/L (ref 35–104)
ALT SERPL W P-5'-P-CCNC: 15 U/L (ref 10–35)
ANION GAP SERPL CALCULATED.3IONS-SCNC: 11 MMOL/L (ref 7–15)
AST SERPL W P-5'-P-CCNC: 26 U/L (ref 10–35)
BILIRUB SERPL-MCNC: 0.5 MG/DL
BUN SERPL-MCNC: 18.3 MG/DL (ref 8–23)
CALCIUM SERPL-MCNC: 9.5 MG/DL (ref 8.8–10.2)
CHLORIDE SERPL-SCNC: 99 MMOL/L (ref 98–107)
CHOLEST SERPL-MCNC: 164 MG/DL
CREAT SERPL-MCNC: 0.72 MG/DL (ref 0.51–0.95)
CREAT UR-MCNC: 151 MG/DL
DEPRECATED HCO3 PLAS-SCNC: 30 MMOL/L (ref 22–29)
GFR SERPL CREATININE-BSD FRML MDRD: >90 ML/MIN/1.73M2
GLUCOSE SERPL-MCNC: 160 MG/DL (ref 70–99)
HBA1C MFR BLD: 7.2 % (ref 0–5.6)
HDLC SERPL-MCNC: 45 MG/DL
LDLC SERPL CALC-MCNC: 75 MG/DL
MICROALBUMIN UR-MCNC: 26.9 MG/L
MICROALBUMIN/CREAT UR: 17.81 MG/G CR (ref 0–25)
NONHDLC SERPL-MCNC: 119 MG/DL
POTASSIUM SERPL-SCNC: 3.6 MMOL/L (ref 3.4–5.3)
PROT SERPL-MCNC: 7.6 G/DL (ref 6.4–8.3)
SODIUM SERPL-SCNC: 140 MMOL/L (ref 136–145)
TRIGL SERPL-MCNC: 220 MG/DL
VIT B12 SERPL-MCNC: 625 PG/ML (ref 232–1245)

## 2023-05-05 PROCEDURE — 80053 COMPREHEN METABOLIC PANEL: CPT | Performed by: FAMILY MEDICINE

## 2023-05-05 PROCEDURE — 99214 OFFICE O/P EST MOD 30 MIN: CPT | Mod: 25 | Performed by: FAMILY MEDICINE

## 2023-05-05 PROCEDURE — 82043 UR ALBUMIN QUANTITATIVE: CPT | Performed by: FAMILY MEDICINE

## 2023-05-05 PROCEDURE — 99207 PR FOOT EXAM NO CHARGE: CPT | Mod: 25 | Performed by: FAMILY MEDICINE

## 2023-05-05 PROCEDURE — 36415 COLL VENOUS BLD VENIPUNCTURE: CPT | Performed by: FAMILY MEDICINE

## 2023-05-05 PROCEDURE — 83036 HEMOGLOBIN GLYCOSYLATED A1C: CPT | Performed by: FAMILY MEDICINE

## 2023-05-05 PROCEDURE — 90677 PCV20 VACCINE IM: CPT | Performed by: FAMILY MEDICINE

## 2023-05-05 PROCEDURE — 99396 PREV VISIT EST AGE 40-64: CPT | Mod: 25 | Performed by: FAMILY MEDICINE

## 2023-05-05 PROCEDURE — 82607 VITAMIN B-12: CPT | Performed by: FAMILY MEDICINE

## 2023-05-05 PROCEDURE — 99000 SPECIMEN HANDLING OFFICE-LAB: CPT | Performed by: FAMILY MEDICINE

## 2023-05-05 PROCEDURE — 84425 ASSAY OF VITAMIN B-1: CPT | Mod: 90 | Performed by: FAMILY MEDICINE

## 2023-05-05 PROCEDURE — 82570 ASSAY OF URINE CREATININE: CPT | Performed by: FAMILY MEDICINE

## 2023-05-05 PROCEDURE — 90471 IMMUNIZATION ADMIN: CPT | Performed by: FAMILY MEDICINE

## 2023-05-05 PROCEDURE — 80061 LIPID PANEL: CPT | Performed by: FAMILY MEDICINE

## 2023-05-05 ASSESSMENT — PAIN SCALES - GENERAL: PAINLEVEL: NO PAIN (0)

## 2023-05-05 NOTE — PATIENT INSTRUCTIONS
Please schedule an eye exam soon!    Shingles vaccine  I strongly recommend getting the shingles vaccine (Shingrix) if you are over age 50, but please check with your insurance to see how much you might have to pay for this vaccine! If you are on most insurance plans including Medicare, it's covered if you get it at a pharmacy but not in a clinic.    This shot will prevent shingles, a painful skin rash that you are at risk for getting if you have ever had chicken pox. Sometimes the pain will last the rest of your life, even after the rash has healed, and there is not much that we can do to relieve the pain. The vaccine is 98% effective at preventing shingles.    Please consider getting this vaccine! You'll need #2 vaccines 2-4 months apart to be protected.      Preventive Health Recommendations  Female Ages 50 - 64    Yearly exam: See your health care provider every year in order to  Review health changes.   Discuss preventive care.    Review your medicines if your doctor has prescribed any.    Get a Pap test with HPV screening every 5 years.    You should be tested each year for STDs (sexually transmitted diseases) if you're at risk.   Have a mammogram every 1 to 2 years.  Have a colonoscopy at age 50, or have a yearly FIT test (stool test). These exams screen for colon cancer.    Have a cholesterol test every 5 years, or more often if advised.  Have a diabetes test (fasting glucose) every three years. If you are at risk for diabetes, you should have this test more often.   If you are at risk for osteoporosis (brittle bone disease), think about having a bone density scan (DEXA).    Shots: Get a flu shot each year. Get a tetanus shot every 10 years.    Nutrition:   Eat at least 5 servings of fruits and vegetables each day.  Eat whole-grain bread, whole-wheat pasta and brown rice instead of white grains and rice.  Get adequate Calcium and Vitamin D.     Lifestyle  Exercise at least 150 minutes a week (30 minutes a  day, 5 days a week). This will help you control your weight and prevent disease.  Limit alcohol to one drink per day.  No smoking.   Wear sunscreen to prevent skin cancer.   See your dentist every six months for an exam and cleaning.  See your eye doctor every 1 to 2 years.

## 2023-05-05 NOTE — NURSING NOTE
Prior to immunization administration, verified patients identity using patient s name and date of birth. Please see Immunization Activity for additional information.     Screening Questionnaire for Adult Immunization    Are you sick today?   No   Do you have allergies to medications, food, a vaccine component or latex?   No   Have you ever had a serious reaction after receiving a vaccination?   No   Do you have a long-term health problem with heart, lung, kidney, or metabolic disease (e.g., diabetes), asthma, a blood disorder, no spleen, complement component deficiency, a cochlear implant, or a spinal fluid leak?  Are you on long-term aspirin therapy?   No   Do you have cancer, leukemia, HIV/AIDS, or any other immune system problem?   No   Do you have a parent, brother, or sister with an immune system problem?   No   In the past 3 months, have you taken medications that affect  your immune system, such as prednisone, other steroids, or anticancer drugs; drugs for the treatment of rheumatoid arthritis, Crohn s disease, or psoriasis; or have you had radiation treatments?   No   Have you had a seizure, or a brain or other nervous system problem?   No   During the past year, have you received a transfusion of blood or blood    products, or been given immune (gamma) globulin or antiviral drug?   No   For women: Are you pregnant or is there a chance you could become       pregnant during the next month?   No   Have you received any vaccinations in the past 4 weeks?   No     Immunization questionnaire answers were all negative.      Injection of PCV20 given by Ira Jean CMA. Patient instructed to remain in clinic for 15 minutes afterwards, and to report any adverse reactions.     Screening performed by Ira Jean CMA on 5/5/2023 at 10:18 AM.

## 2023-05-05 NOTE — PROGRESS NOTES
SUBJECTIVE:   CC: Lesli is an 62 year old who presents for preventive health visit and chronic disease management.    Diabetes Follow-up      How often are you checking your blood sugar? Not at all    What concerns do you have today about your diabetes? None     Do you have any of these symptoms? (Select all that apply)  No numbness or tingling in feet.  No redness, sores or blisters on feet.  No complaints of excessive thirst.  No reports of blurry vision.  No significant changes to weight.    Have you had a diabetic eye exam in the last 12 months? Yes- Date of last eye exam: 1/1/2021,  Location: [facility] [place]    Hyperlipidemia Follow-Up      Are you regularly taking any medication or supplement to lower your cholesterol?   Yes- lovastatin 20 mg    Are you having muscle aches or other side effects that you think could be caused by your cholesterol lowering medication?  No    Hypertension Follow-up      Do you check your blood pressure regularly outside of the clinic? No     Are you following a low salt diet? No    Are your blood pressures ever more than 140 on the top number (systolic) OR more   than 90 on the bottom number (diastolic), for example 140/90? Doesn't know    BP Readings from Last 2 Encounters:   05/05/23 (!) 145/88   03/10/22 136/84     Hemoglobin A1C (%)   Date Value   05/05/2023 7.2 (H)   03/10/2022 6.9 (H)   01/08/2021 6.6 (H)   11/26/2019 6.7 (H)     LDL Cholesterol Calculated (mg/dL)   Date Value   03/10/2022 73   01/08/2021 96   11/26/2019 98     Neuropathy  Lesli reports feet are numb/tingling without pain or burning sensation, present for many years, without any current concerns about sores on the feet or balance difficulties. She does report drinking more alcohol over the tough prior winter, and not exercising much. Both feet are symmetrically involved.        5/5/2023     9:15 AM   Additional Questions   Roomed by Felicita Brink   Patient has been advised of split billing  requirements and indicates understanding: Yes  Healthy Habits:     Getting at least 3 servings of Calcium per day:  Yes    Bi-annual eye exam:  Yes    Dental care twice a year:  Yes    Sleep apnea or symptoms of sleep apnea:  None    Diet:  Vegetarian/vegan    Frequency of exercise:  1 day/week    Duration of exercise:  Less than 15 minutes    Taking medications regularly:  0    Barriers to taking medications:  None    Medication side effects:  None    PHQ-2 Total Score: 0    Additional concerns today:  No  History of Present Illness       Reason for visit:  Annual checkup    She eats 4 or more servings of fruits and vegetables daily.She consumes 0 sweetened beverage(s) daily.She exercises with enough effort to increase her heart rate 10 to 19 minutes per day.  She exercises with enough effort to increase her heart rate 5 days per week.   She is taking medications regularly.  She is not taking prescribed medications regularly due to None.    Today's PHQ-2 Score:       5/3/2023     4:25 PM   PHQ-2 (  [employer])   Q1: Little interest or pleasure in doing things 0   Q2: Feeling down, depressed or hopeless 0   PHQ-2 Score 0   Q1: Little interest or pleasure in doing things Not at all   Q2: Feeling down, depressed or hopeless Not at all   PHQ-2 Score 0       Have you ever done Advance Care Planning? (For example, a Health Directive, POLST, or a discussion with a medical provider or your loved ones about your wishes): No, advance care planning information given to patient to review.  Patient declined advance care planning discussion at this time.    Social History     Tobacco Use     Smoking status: Former     Packs/day: 1.00     Years: 20.00     Pack years: 20.00     Types: Cigarettes     Quit date: 2010     Years since quittin.3     Smokeless tobacco: Former     Tobacco comments:     5 cigarettes day or less   Vaping Use     Vaping status: Never Used   Substance Use Topics     Alcohol use: Yes     Comment: 1-2   beer daily on average             3/10/2022     9:27 AM   Alcohol Use   Prescreen: >3 drinks/day or >7 drinks/week? No          View : No data to display.              Reviewed orders with patient.  Reviewed health maintenance and updated orders accordingly - Yes  Allergies   Allergen Reactions     Amoxicillin Rash       Breast Cancer Screening:        3/10/2022     9:27 AM   Breast CA Risk Assessment (FHS-7)   Do you have a family history of breast, colon, or ovarian cancer? No / Unknown   Mammogram Screening: Recommended mammography every 1-2 years with patient discussion and risk factor consideration  Pertinent mammograms are reviewed under the imaging tab.    History of abnormal Pap smear: NO - age 30-65 PAP every 5 years with negative HPV co-testing recommended      Latest Ref Rng & Units 2021    10:31 AM 2021     9:54 AM 2016    11:44 AM   PAP / HPV   PAP (Historical)   NIL      HPV 16 DNA NEG^Negative Negative    Negative     HPV 18 DNA NEG^Negative Negative    Negative     Other HR HPV NEG^Negative Negative    Negative       Reviewed and updated as needed this visit by clinical staff   Tobacco  Allergies  Meds              Reviewed and updated as needed this visit by Provider                 Past Medical History:   Diagnosis Date     Baker's cyst of knee     Lt (7.3x4.3x1.6 cm)     Elevated glucose      Hyperlipidemia LDL goal <130      Hypertension goal BP (blood pressure) < 140/90      Idiopathic edema      NONSPECIFIC MEDICAL HISTORY     nephrolithiasis     Personal history of smoking     quit in      Secondary lymphedema 19    DX by [facility] [place], lose wt. at this point     Unspecified iridocyclitis       Past Surgical History:   Procedure Laterality Date     COLONOSCOPY  11    repeat 3 yrs, one inconclusive area     OB History    Para Term  AB Living   1 0 0 0 1 0   SAB IAB Ectopic Multiple Live Births   0 1 0 0 0      # Outcome Date GA Lbr Bryn/2nd Weight  "Sex Delivery Anes PTL Lv   1 IAB              Review of Systems  CONSTITUTIONAL: NEGATIVE for fever, chills, change in weight  INTEGUMENTARY/SKIN: NEGATIVE for worrisome rashes, moles or lesions  EYES: NEGATIVE for vision changes or irritation  ENT: NEGATIVE for ear, mouth and throat problems  RESP: NEGATIVE for significant cough or SOB  BREAST: NEGATIVE for masses, tenderness or discharge  CV: NEGATIVE for chest pain, palpitations or peripheral edema  GI: NEGATIVE for nausea, abdominal pain, heartburn, or change in bowel habits  : NEGATIVE for unusual urinary or vaginal symptoms. No vaginal bleeding.  MUSCULOSKELETAL: NEGATIVE for significant arthralgias or myalgia  NEURO: NEGATIVE for weakness, dizziness or paresthesias  PSYCHIATRIC: NEGATIVE for changes in mood or affect      OBJECTIVE:   BP (!) 145/88 (BP Location: Right arm, Patient Position: Sitting, Cuff Size: Adult Regular)   Pulse 67   Temp 97.8  F (36.6  C) (Temporal)   Resp 15   Ht 1.778 m (5' 10\")   Wt 118.8 kg (262 lb)   LMP 12/22/2012   SpO2 94%   BMI 37.59 kg/m     Wt Readings from Last 4 Encounters:   05/05/23 118.8 kg (262 lb)   03/10/22 113.4 kg (250 lb)   01/08/21 108.7 kg (239 lb 9.6 oz)   07/12/20 108 kg (238 lb)       Physical Exam  GENERAL: healthy, alert and no distress  EYES: Eyes grossly normal to inspection, PERRL and conjunctivae and sclerae normal  HENT: ear canals and TM's normal, nose and mouth without ulcers or lesions  NECK: no adenopathy, no asymmetry, masses, or scars and thyroid normal to palpation  RESP: lungs clear to auscultation - no rales, rhonchi or wheezes  BREAST: normal without masses, tenderness or nipple discharge and no palpable axillary masses or adenopathy  CV: regular rate and rhythm, normal S1 S2, no S3 or S4, no murmur, click or rub, no peripheral edema and peripheral pulses strong  ABDOMEN: soft, nontender, no hepatosplenomegaly, no masses and bowel sounds normal  MS: no gross musculoskeletal defects " noted, no edema  SKIN: no suspicious lesions or rashes  NEURO: Normal strength and tone, mentation intact and speech normal  PSYCH: mentation appears normal, affect normal/bright  Diabetic foot exam: normal DP and PT pulses, no trophic changes or ulcerative lesions, normal sensory exam and monofilament exam reveals decreased sensation of dorsal and plantar surface of both feet and all toes with normal capillary refill and no lesions noted. Both great toenails are thickened and opacified.    Diagnostic Test Results:  Labs reviewed in Epic  Results for orders placed or performed in visit on 05/05/23 (from the past 24 hour(s))   Hemoglobin A1c   Result Value Ref Range    Hemoglobin A1C 7.2 (H) 0.0 - 5.6 %       ASSESSMENT/PLAN:   (Z00.00) Routine general medical examination at a health care facility  (primary encounter diagnosis)      (E11.65) Type 2 diabetes mellitus with hyperglycemia, without long-term current use of insulin (H)  Comment: A1C at goal, but has increased, Lesli feels she can exercise more, eat a healthier diet, and reduce alcohol intake.  Plan: Hemoglobin A1c, **Hemoglobin A1c FUTURE 3mo,         FOOT EXAM        Return to clinic 3 months for diabetes recheck, recheck A1C, already scheduled for August 3rd.    (E78.5) Hyperlipidemia LDL goal <130  Comment:   LDL Cholesterol Calculated   Date Value Ref Range Status   03/10/2022 73 <=100 mg/dL Final   01/08/2021 96 <100 mg/dL Final     Comment:     Desirable:       <100 mg/dl    At goal  The current medical regimen is effective;  continue present plan and medications.    Plan: Lipid panel reflex to direct LDL Fasting,         Comprehensive metabolic panel, methylcellulose         (CITRUCEL) powder          (I10) Essential hypertension with goal blood pressure less than 140/90  Comment:   BP Readings from Last 3 Encounters:   05/05/23 (!) 145/88   03/10/22 136/84   01/08/21 138/78    not at goal, see above, she will incorporate healthier lifestyle choice  and return to clinic 3 months for recheck.  Plan: Albumin Random Urine Quantitative with Creat         Ratio, Comprehensive metabolic panel        If blood pressure still elevated in August, will increase carvedilol or HCTZ    (G62.9) Neuropathy  Comment: diabetic vs vitamin deficiency, will check labs as below  Plan: Vitamin B12, Vitamin B1 whole blood        Will fu as indicated.     (Z12.31) Visit for screening mammogram  Comment: she was reminded to schedule her mammogram  Plan: MA SCREENING DIGITAL BILAT - Future  (s+30)            Patient has been advised of split billing requirements and indicates understanding: Yes      COUNSELING:  Reviewed preventive health counseling, as reflected in patient instructions        She reports that she quit smoking about 13 years ago. Her smoking use included cigarettes. She has a 20.00 pack-year smoking history. She has quit using smokeless tobacco.          Gretchen Vargas MD  [facility]

## 2023-05-09 LAB — VIT B1 PYROPHOSHATE BLD-SCNC: 187 NMOL/L

## 2023-05-11 NOTE — RESULT ENCOUNTER NOTE
Thank you very much for getting labs done! Everything looks normal/stable. Good job! Your B vitamin levels are normal (even slightly high, which is fine.    Your HgA1C, also called glycosylated hemoglobin, which measures the level of sugar in your blood over the past few months, is at goal, which is great! This number has crept up so we will keep an eye on it.    If you have any questions, please contact the clinic or schedule an appointment with me, thank you!    Sincerely,  Dr. Gretchen Vargas MD  5/11/2023

## 2023-05-22 ENCOUNTER — ANCILLARY PROCEDURE (OUTPATIENT)
Dept: MAMMOGRAPHY | Facility: CLINIC | Age: 63
End: 2023-05-22
Attending: FAMILY MEDICINE
Payer: COMMERCIAL

## 2023-05-22 DIAGNOSIS — Z12.31 VISIT FOR SCREENING MAMMOGRAM: ICD-10-CM

## 2023-05-22 PROCEDURE — 77067 SCR MAMMO BI INCL CAD: CPT | Mod: 26

## 2023-05-22 PROCEDURE — 77067 SCR MAMMO BI INCL CAD: CPT

## 2023-07-08 DIAGNOSIS — I10 ESSENTIAL HYPERTENSION WITH GOAL BLOOD PRESSURE LESS THAN 140/90: ICD-10-CM

## 2023-07-10 NOTE — TELEPHONE ENCOUNTER
"Routing refill request to provider for review/approval because:  bp out of range    Last Written Prescription Date:  4/10/23  Last Fill Quantity: 45,  # refills: 0   Last office visit provider:  5/5/23     Requested Prescriptions   Pending Prescriptions Disp Refills     hydrochlorothiazide (HYDRODIURIL) 50 MG tablet [Pharmacy Med Name: HYDROCHLOROTHIAZIDE 50MG TABLETS] 45 tablet 0     Sig: TAKE ONE-HALF TABLET BY MOUTH EVERY DAY       Diuretics (Including Combos) Protocol Failed - 7/8/2023  9:07 PM        Failed - Blood pressure under 140/90 in past 12 months     BP Readings from Last 3 Encounters:   05/05/23 (!) 145/88   03/10/22 136/84   01/08/21 138/78                 Passed - Recent (12 mo) or future (30 days) visit within the authorizing provider's specialty     Patient has had an office visit with the authorizing provider or a provider within the authorizing providers department within the previous 12 mos or has a future within next 30 days. See \"Patient Info\" tab in inbasket, or \"Choose Columns\" in Meds & Orders section of the refill encounter.              Passed - Medication is active on med list        Passed - Patient is age 18 or older        Passed - No active pregancy on record        Passed - Normal serum creatinine on file in past 12 months     Recent Labs   Lab Test 05/05/23  0909   CR 0.72              Passed - Normal serum potassium on file in past 12 months     Recent Labs   Lab Test 05/05/23  0909   POTASSIUM 3.6                    Passed - Normal serum sodium on file in past 12 months     Recent Labs   Lab Test 05/05/23  0909                 Passed - No positive pregnancy test in past 12 months             Charleen Bryan, RN 07/09/23 7:30 PM  "

## 2023-07-11 RX ORDER — HYDROCHLOROTHIAZIDE 50 MG/1
TABLET ORAL
Qty: 45 TABLET | Refills: 0 | Status: SHIPPED | OUTPATIENT
Start: 2023-07-11 | End: 2023-10-06

## 2023-11-05 DIAGNOSIS — I10 ESSENTIAL HYPERTENSION WITH GOAL BLOOD PRESSURE LESS THAN 140/90: ICD-10-CM

## 2023-11-22 ENCOUNTER — MYC REFILL (OUTPATIENT)
Dept: FAMILY MEDICINE | Facility: CLINIC | Age: 63
End: 2023-11-22
Payer: COMMERCIAL

## 2023-11-22 DIAGNOSIS — I10 ESSENTIAL HYPERTENSION WITH GOAL BLOOD PRESSURE LESS THAN 140/90: ICD-10-CM

## 2023-11-24 RX ORDER — HYDROCHLOROTHIAZIDE 50 MG/1
25 TABLET ORAL DAILY
Qty: 45 TABLET | Refills: 3 | Status: SHIPPED | OUTPATIENT
Start: 2023-11-24

## 2024-01-07 ENCOUNTER — HEALTH MAINTENANCE LETTER (OUTPATIENT)
Age: 64
End: 2024-01-07

## 2024-02-03 DIAGNOSIS — E78.5 HYPERLIPIDEMIA LDL GOAL <130: ICD-10-CM

## 2024-02-03 RX ORDER — HYDROCHLOROTHIAZIDE 50 MG/1
TABLET ORAL
Qty: 15 TABLET | Refills: 0 | OUTPATIENT
Start: 2024-02-03

## 2024-02-05 RX ORDER — LOVASTATIN 20 MG
TABLET ORAL
Qty: 45 TABLET | Refills: 0 | Status: SHIPPED | OUTPATIENT
Start: 2024-02-05 | End: 2024-05-24

## 2024-02-06 DIAGNOSIS — I10 ESSENTIAL HYPERTENSION WITH GOAL BLOOD PRESSURE LESS THAN 140/90: ICD-10-CM

## 2024-02-06 RX ORDER — LISINOPRIL 40 MG/1
60 TABLET ORAL DAILY
Qty: 45 TABLET | Refills: 11 | Status: SHIPPED | OUTPATIENT
Start: 2024-02-06

## 2024-02-06 RX ORDER — CARVEDILOL 6.25 MG/1
6.25 TABLET ORAL 2 TIMES DAILY WITH MEALS
Qty: 60 TABLET | Refills: 11 | Status: SHIPPED | OUTPATIENT
Start: 2024-02-06 | End: 2025-01-31

## 2024-03-08 ENCOUNTER — OFFICE VISIT (OUTPATIENT)
Dept: FAMILY MEDICINE | Facility: CLINIC | Age: 64
End: 2024-03-08
Payer: COMMERCIAL

## 2024-03-08 VITALS
RESPIRATION RATE: 15 BRPM | OXYGEN SATURATION: 96 % | DIASTOLIC BLOOD PRESSURE: 86 MMHG | SYSTOLIC BLOOD PRESSURE: 144 MMHG | HEIGHT: 70 IN | BODY MASS INDEX: 37.52 KG/M2 | TEMPERATURE: 97.7 F | WEIGHT: 262.1 LBS | HEART RATE: 73 BPM

## 2024-03-08 DIAGNOSIS — Z12.11 SCREEN FOR COLON CANCER: ICD-10-CM

## 2024-03-08 DIAGNOSIS — E11.65 TYPE 2 DIABETES MELLITUS WITH HYPERGLYCEMIA, WITHOUT LONG-TERM CURRENT USE OF INSULIN (H): ICD-10-CM

## 2024-03-08 DIAGNOSIS — E78.5 HYPERLIPIDEMIA LDL GOAL <130: Primary | ICD-10-CM

## 2024-03-08 LAB — HBA1C MFR BLD: 7.5 % (ref 0–5.6)

## 2024-03-08 PROCEDURE — 91320 SARSCV2 VAC 30MCG TRS-SUC IM: CPT | Performed by: FAMILY MEDICINE

## 2024-03-08 PROCEDURE — 99214 OFFICE O/P EST MOD 30 MIN: CPT | Performed by: FAMILY MEDICINE

## 2024-03-08 PROCEDURE — 82043 UR ALBUMIN QUANTITATIVE: CPT | Performed by: FAMILY MEDICINE

## 2024-03-08 PROCEDURE — 80053 COMPREHEN METABOLIC PANEL: CPT | Performed by: FAMILY MEDICINE

## 2024-03-08 PROCEDURE — 90480 ADMN SARSCOV2 VAC 1/ONLY CMP: CPT | Performed by: FAMILY MEDICINE

## 2024-03-08 PROCEDURE — 82570 ASSAY OF URINE CREATININE: CPT | Performed by: FAMILY MEDICINE

## 2024-03-08 PROCEDURE — 83036 HEMOGLOBIN GLYCOSYLATED A1C: CPT | Performed by: FAMILY MEDICINE

## 2024-03-08 PROCEDURE — 80061 LIPID PANEL: CPT | Performed by: FAMILY MEDICINE

## 2024-03-08 PROCEDURE — 36415 COLL VENOUS BLD VENIPUNCTURE: CPT | Performed by: FAMILY MEDICINE

## 2024-03-08 ASSESSMENT — PAIN SCALES - GENERAL: PAINLEVEL: NO PAIN (0)

## 2024-03-08 NOTE — RESULT ENCOUNTER NOTE
Patient was seen today in clinic.  I discussed results in clinic, please see clinic progress note.    Gretchen Vargas MD 3/8/2024

## 2024-03-08 NOTE — PATIENT INSTRUCTIONS
Results for orders placed or performed in visit on 03/08/24   Hemoglobin A1c     Status: Abnormal   Result Value Ref Range    Hemoglobin A1C 7.5 (H) 0.0 - 5.6 %     Plan: walk more to lower blood sugar and blood pressure.    Diabetes Data    BP Readings from Last 2 Encounters:   03/08/24 (!) 144/86   05/05/23 (!) 145/88     Hemoglobin A1C (%)   Date Value   03/08/2024 7.5 (H)   05/05/2023 7.2 (H)   01/08/2021 6.6 (H)   11/26/2019 6.7 (H)     LDL Cholesterol Calculated (mg/dL)   Date Value   05/05/2023 75   03/10/2022 73   01/08/2021 96   11/26/2019 98     Lab Results   Component Value Date    MICROL 26.9 05/05/2023    MICROL 32 03/10/2022    MICROL 24 01/08/2021     GFR Estimate   Date Value Ref Range Status   05/05/2023 >90 >60 mL/min/1.73m2 Final     Comment:     eGFR calculated using 2021 CKD-EPI equation.   03/10/2022 >90 >60 mL/min/1.73m2 Final     Comment:     Effective December 21, 2021 eGFRcr in adults is calculated using the 2021 CKD-EPI creatinine equation which includes age and gender (Misael et al., NEJ, DOI: 10.1056/TVHYai0157467)   01/08/2021 >90 >60 mL/min/[1.73_m2] Final     Comment:     Non  GFR Calc  Starting 12/18/2018, serum creatinine based estimated GFR (eGFR) will be   calculated using the Chronic Kidney Disease Epidemiology Collaboration   (CKD-EPI) equation.     11/26/2019 >90 >60 mL/min/[1.73_m2] Final     Comment:     Non  GFR Calc  Starting 12/18/2018, serum creatinine based estimated GFR (eGFR) will be   calculated using the Chronic Kidney Disease Epidemiology Collaboration   (CKD-EPI) equation.     11/16/2018 86 >60 mL/min/1.7m2 Final     Comment:     Non  GFR Calc     [facility]  [address], [place]  [phone]     Dr. Winston Lennon, opthalmologist  [facility]   [address]   [place] [address]   Phone:   [phone]

## 2024-03-08 NOTE — PROGRESS NOTES
Prior to immunization administration, verified patients identity using patient s name and date of birth. Please see Immunization Activity for additional information.     Screening Questionnaire for Adult Immunization    Are you sick today?   No   Do you have allergies to medications, food, a vaccine component or latex?   Yes, amoxicillin   Have you ever had a serious reaction after receiving a vaccination?   No   Do you have a long-term health problem with heart, lung, kidney, or metabolic disease (e.g., diabetes), asthma, a blood disorder, no spleen, complement component deficiency, a cochlear implant, or a spinal fluid leak?  Are you on long-term aspirin therapy?   No   Do you have cancer, leukemia, HIV/AIDS, or any other immune system problem?   No   Do you have a parent, brother, or sister with an immune system problem?   No   In the past 3 months, have you taken medications that affect  your immune system, such as prednisone, other steroids, or anticancer drugs; drugs for the treatment of rheumatoid arthritis, Crohn s disease, or psoriasis; or have you had radiation treatments?   No   Have you had a seizure, or a brain or other nervous system problem?   No   During the past year, have you received a transfusion of blood or blood    products, or been given immune (gamma) globulin or antiviral drug?   No   For women: Are you pregnant or is there a chance you could become       pregnant during the next month?   No   Have you received any vaccinations in the past 4 weeks?   No     Immunization questionnaire was positive for at least one answer.  Notified Dr. Vargas.      Patient instructed to remain in clinic for 15 minutes afterwards, and to report any adverse reactions.     Screening performed by Kavon Leonard RN on 3/8/2024 at 11:33 AM.

## 2024-03-08 NOTE — PROGRESS NOTES
ASSESSMENT / PLAN:  (E78.5) Hyperlipidemia LDL goal <130  (primary encounter diagnosis)  Comment:   LDL Cholesterol Calculated   Date Value Ref Range Status   05/05/2023 75 <=100 mg/dL Final   01/08/2021 96 <100 mg/dL Final     Comment:     Desirable:       <100 mg/dl    At goal, great job!    Plan: Lipid panel reflex to direct LDL Fasting,         Comprehensive metabolic panel (BMP + Alb, Alk         Phos, ALT, AST, Total. Bili, TP)          (E11.65) Type 2 diabetes mellitus with hyperglycemia, without long-term current use of insulin (H)  Comment: I recommend goal of < 7, she does not want to start medication. She will work on incorporating more activity into her daily life. She is already counting carbohydrates, plans 60 units or less per meal,and has cut back on alcohol. She declines starting medication. Follow up 3 months, appointment already scheduled.  Eye appointment strongly recommended, see AVS    Plan: Hemoglobin A1c, Comprehensive metabolic panel         (BMP + Alb, Alk Phos, ALT, AST, Total. Bili,         TP), Albumin Random Urine Quantitative with         Creat Ratio          (Z12.11) Screen for colon cancer  Plan: Colonoscopy Screening  Referral            Patient Instructions     Results for orders placed or performed in visit on 03/08/24   Hemoglobin A1c     Status: Abnormal   Result Value Ref Range    Hemoglobin A1C 7.5 (H) 0.0 - 5.6 %     Plan: walk more to lower blood sugar and blood pressure.    Diabetes Data    BP Readings from Last 2 Encounters:   03/08/24 (!) 144/86   05/05/23 (!) 145/88     Hemoglobin A1C (%)   Date Value   03/08/2024 7.5 (H)   05/05/2023 7.2 (H)   01/08/2021 6.6 (H)   11/26/2019 6.7 (H)     LDL Cholesterol Calculated (mg/dL)   Date Value   05/05/2023 75   03/10/2022 73   01/08/2021 96   11/26/2019 98     Lab Results   Component Value Date    MICROL 26.9 05/05/2023    MICROL 32 03/10/2022    MICROL 24 01/08/2021     GFR Estimate   Date Value Ref Range Status    05/05/2023 >90 >60 mL/min/1.73m2 Final     Comment:     eGFR calculated using 2021 CKD-EPI equation.   03/10/2022 >90 >60 mL/min/1.73m2 Final     Comment:     Effective December 21, 2021 eGFRcr in adults is calculated using the 2021 CKD-EPI creatinine equation which includes age and gender (Misael et al., NE, DOI: 10.1056/UDAIjp4838412)   01/08/2021 >90 >60 mL/min/[1.73_m2] Final     Comment:     Non  GFR Calc  Starting 12/18/2018, serum creatinine based estimated GFR (eGFR) will be   calculated using the Chronic Kidney Disease Epidemiology Collaboration   (CKD-EPI) equation.     11/26/2019 >90 >60 mL/min/[1.73_m2] Final     Comment:     Non  GFR Calc  Starting 12/18/2018, serum creatinine based estimated GFR (eGFR) will be   calculated using the Chronic Kidney Disease Epidemiology Collaboration   (CKD-EPI) equation.     11/16/2018 86 >60 mL/min/1.7m2 Final     Comment:     Non  GFR Calc     [facility]  [address], [place]  [phone]     Dr. Winston Lennon, opthalmologist  [facility]   [address]   [place] [address]   Phone:   [phone]         Riccardo Ballesteros is a 63 year old, presenting for the following health issues:  Diabetes and Hypertension        3/8/2024    10:27 AM   Additional Questions   Roomed by Kelin QUINTANA     History of Present Illness       Reason for visit:  Annual checkup    She eats 4 or more servings of fruits and vegetables daily.She consumes 0 sweetened beverage(s) daily.She exercises with enough effort to increase her heart rate 10 to 19 minutes per day.  She exercises with enough effort to increase her heart rate 5 days per week.   She is taking medications regularly.     Diabetes Follow-up    How often are you checking your blood sugar? Not at all  What concerns do you have today about your diabetes? None   Do you have any of these symptoms? (Select all that  "apply)  No numbness or tingling in feet.  No redness, sores or blisters on feet.  No complaints of excessive thirst.  No reports of blurry vision.  No significant changes to weight.  Have you had a diabetic eye exam in the last 12 months? No        BP Readings from Last 2 Encounters:   03/08/24 (!) 144/86   05/05/23 (!) 145/88     Hemoglobin A1C (%)   Date Value   03/08/2024 7.5 (H)   05/05/2023 7.2 (H)   01/08/2021 6.6 (H)   11/26/2019 6.7 (H)     LDL Cholesterol Calculated (mg/dL)   Date Value   05/05/2023 75   03/10/2022 73   01/08/2021 96   11/26/2019 98           Hypertension Follow-up    Do you check your blood pressure regularly outside of the clinic? No   Are you following a low salt diet? Yes  Are your blood pressures ever more than 140 on the top number (systolic) OR more   than 90 on the bottom number (diastolic), for example 140/90? Yes  How many servings of fruits and vegetables do you eat daily?  4 or more  On average, how many sweetened beverages do you drink each day (Examples: soda, juice, sweet tea, etc.  Do NOT count diet or artificially sweetened beverages)?   0  How many days per week do you exercise enough to make your heart beat faster? 3 or less  How many minutes a day do you exercise enough to make your heart beat faster? 9 or less  How many days per week do you miss taking your medication? 0    Review of Systems  Constitutional, HEENT, cardiovascular, pulmonary, gi and gu systems are negative, except as otherwise noted.      Objective    BP (!) 144/86 (BP Location: Right arm, Patient Position: Sitting, Cuff Size: Adult Regular)   Pulse 73   Temp 97.7  F (36.5  C) (Temporal)   Resp 15   Ht 1.778 m (5' 10\")   Wt 118.9 kg (262 lb 1.6 oz)   LMP 12/22/2012   SpO2 96%   BMI 37.61 kg/m    Body mass index is 37.61 kg/m .  Physical Exam   GENERAL: alert and no distress  NECK: no adenopathy, no asymmetry, masses, or scars  RESP: lungs clear to auscultation - no rales, rhonchi or wheezes  CV: " regular rate and rhythm, normal S1 S2, no S3 or S4, no murmur, click or rub, no peripheral edema  MS: no gross musculoskeletal defects noted, no edema    Results for orders placed or performed in visit on 03/08/24 (from the past 24 hour(s))   Hemoglobin A1c   Result Value Ref Range    Hemoglobin A1C 7.5 (H) 0.0 - 5.6 %           Signed Electronically by: Gretchen Vargas MD

## 2024-03-09 LAB
ALBUMIN SERPL BCG-MCNC: 4.1 G/DL (ref 3.5–5.2)
ALP SERPL-CCNC: 88 U/L (ref 40–150)
ALT SERPL W P-5'-P-CCNC: 19 U/L (ref 0–50)
ANION GAP SERPL CALCULATED.3IONS-SCNC: 8 MMOL/L (ref 7–15)
AST SERPL W P-5'-P-CCNC: 28 U/L (ref 0–45)
BILIRUB SERPL-MCNC: 0.5 MG/DL
BUN SERPL-MCNC: 13.8 MG/DL (ref 8–23)
CALCIUM SERPL-MCNC: 9.5 MG/DL (ref 8.8–10.2)
CHLORIDE SERPL-SCNC: 98 MMOL/L (ref 98–107)
CHOLEST SERPL-MCNC: 163 MG/DL
CREAT SERPL-MCNC: 0.72 MG/DL (ref 0.51–0.95)
CREAT UR-MCNC: 152 MG/DL
DEPRECATED HCO3 PLAS-SCNC: 36 MMOL/L (ref 22–29)
EGFRCR SERPLBLD CKD-EPI 2021: >90 ML/MIN/1.73M2
FASTING STATUS PATIENT QL REPORTED: YES
GLUCOSE SERPL-MCNC: 136 MG/DL (ref 70–99)
HDLC SERPL-MCNC: 44 MG/DL
LDLC SERPL CALC-MCNC: 83 MG/DL
MICROALBUMIN UR-MCNC: 55.6 MG/L
MICROALBUMIN/CREAT UR: 36.58 MG/G CR (ref 0–25)
NONHDLC SERPL-MCNC: 119 MG/DL
POTASSIUM SERPL-SCNC: 4.1 MMOL/L (ref 3.4–5.3)
PROT SERPL-MCNC: 7.9 G/DL (ref 6.4–8.3)
SODIUM SERPL-SCNC: 142 MMOL/L (ref 135–145)
TRIGL SERPL-MCNC: 179 MG/DL

## 2024-03-12 NOTE — RESULT ENCOUNTER NOTE
Thank you very much for getting labs done! The rest of the results are back.    Your microalbumen is elevated. This means you have some protein in the urine. It indicates that your kidneys are being affected by diabetes. Keeping blood pressure and blood fats and sugar low is the best treatment in order to keep this  stable. People with microalbumen should avoid anti-inflamatory agents such as motrin, alleve and ibuprofen as much as possible. Anybody that has this should be on lisinopril-as you already are-since this medication protects your kidneys.    Good news, your LDL (or bad cholesterol) is at goal. Your medication is working well. Please continue to take your cholesterol lowering medication, lovastatin, as you have been doing.    The blood testing of your kidney function, liver function and electrolytes was normal.     If you have any questions, please contact the clinic or schedule an appointment with me, thank you!      Sincerely,  Dr. Gretchen Vargas MD  3/12/2024

## 2024-03-29 DIAGNOSIS — Z12.11 COLON CANCER SCREENING: ICD-10-CM

## 2024-04-05 ENCOUNTER — PATIENT OUTREACH (OUTPATIENT)
Dept: CARE COORDINATION | Facility: CLINIC | Age: 64
End: 2024-04-05
Payer: COMMERCIAL

## 2024-04-12 ENCOUNTER — ORDERS ONLY (AUTO-RELEASED) (OUTPATIENT)
Dept: FAMILY MEDICINE | Facility: CLINIC | Age: 64
End: 2024-04-12
Payer: COMMERCIAL

## 2024-04-12 DIAGNOSIS — Z12.11 COLON CANCER SCREENING: ICD-10-CM

## 2024-04-18 DIAGNOSIS — Z12.11 COLON CANCER SCREENING: Primary | ICD-10-CM

## 2024-04-19 ENCOUNTER — PATIENT OUTREACH (OUTPATIENT)
Dept: CARE COORDINATION | Facility: CLINIC | Age: 64
End: 2024-04-19
Payer: COMMERCIAL

## 2024-05-24 DIAGNOSIS — E78.5 HYPERLIPIDEMIA LDL GOAL <130: ICD-10-CM

## 2024-05-24 RX ORDER — LOVASTATIN 20 MG
10 TABLET ORAL AT BEDTIME
Qty: 45 TABLET | Refills: 1 | Status: SHIPPED | OUTPATIENT
Start: 2024-05-24

## 2024-05-24 NOTE — TELEPHONE ENCOUNTER
"Routing to refills. Patient requests a call when sent. Ok to LDM.     Patient calling to request a refill of her lovastatin as [facility] says it was \"discontinued by their PCP\". When really there are just no refills on file.    "

## 2024-07-11 ENCOUNTER — TELEPHONE (OUTPATIENT)
Dept: GASTROENTEROLOGY | Facility: CLINIC | Age: 64
End: 2024-07-11
Payer: COMMERCIAL

## 2024-07-11 ENCOUNTER — HOSPITAL ENCOUNTER (OUTPATIENT)
Facility: AMBULATORY SURGERY CENTER | Age: 64
End: 2024-07-11
Attending: INTERNAL MEDICINE
Payer: COMMERCIAL

## 2024-07-11 NOTE — TELEPHONE ENCOUNTER
"Endoscopy Scheduling Screen    Have you had a positive Covid test in the last 14 days?  No    What is your communication preference for Instructions and/or Bowel Prep?   MyChart    What insurance is in the chart?  Other:  BCBS    Ordering/Referring Provider: Sam   (If ordering provider performs procedure, schedule with ordering provider unless otherwise instructed. )    BMI: Estimated body mass index is 37.61 kg/m  as calculated from the following:    Height as of 3/8/24: 1.778 m (5' 10\").    Weight as of 3/8/24: 118.9 kg (262 lb 1.6 oz).     Sedation Ordered  moderate sedation.   If patient BMI > 50 do not schedule in ASC.    If patient BMI > 45 do not schedule at [facility].    Are you taking methadone or Suboxone?  No    Have you had difficulties, pain, or discomfort during past endoscopy procedures?  No    Are you taking any prescription medications for pain 3 or more times per week?   NO, No RN review required.    Do you have a history of malignant hyperthermia?  No    (Females) Are you currently pregnant?        Have you been diagnosed or told you have pulmonary hypertension?   No    Do you have an LVAD?  No    Have you been told you have moderate to severe sleep apnea?  No    Have you been told you have COPD, asthma, or any other lung disease?  No    Do you have any heart conditions?  No     Have you ever had or are you waiting for an organ transplant?  No. Continue scheduling, no site restrictions.    Have you had a stroke or transient ischemic attack (TIA aka \"mini stroke\" in the last 6 months?   No    Have you been diagnosed with or been told you have cirrhosis of the liver?   No    Are you currently on dialysis?   No    Do you need assistance transferring?   No    BMI: Estimated body mass index is 37.61 kg/m  as calculated from the following:    Height as of 3/8/24: 1.778 m (5' 10\").    Weight as of 3/8/24: 118.9 kg (262 lb 1.6 oz).     Is patients BMI > 40 and scheduling location [facility]?  No    Do you take an " injectable medication for weight loss or diabetes (excluding insulin)?  No    Do you take the medication Naltrexone?  No    Do you take blood thinners?  No       Prep   Are you currently on dialysis or do you have chronic kidney disease?  No    Do you have a diagnosis of diabetes?  No-pre per MD    Do you have a diagnosis of cystic fibrosis (CF)?  No    On a regular basis do you go 3 -5 days between bowel movements?  No    BMI > 40?  No    Preferred Pharmacy:    [id number] [address] [facility] & [address] [place] [id number]  Phone: [phone] Fax: [phone]      Final Scheduling Details     Procedure scheduled  Colonoscopy    Surgeon:       Date of procedure:  9/24/24     Pre-OP / PAC:   No - Not required for this site.    Location  [facility] - [facility] - Patient preference.    Sedation   Moderate Sedation  Per patient request.  Order was for no sedation, patient did want moderate sedation since that is what she had for her previous colonoscopy      Patient Reminders:   You will receive a call from a Nurse to review instructions and health history.  This assessment must be completed prior to your procedure.  Failure to complete the Nurse assessment may result in the procedure being cancelled.      On the day of your procedure, please designate an adult(s) who can drive you home stay with you for the next 24 hours. The medicines used in the exam will make you sleepy. You will not be able to drive.      You cannot take public transportation, ride share services, or non-medical taxi service without a responsible caregiver.  Medical transport services are allowed with the requirement that a responsible caregiver will receive you at your destination.  We require that drivers and caregivers are confirmed prior to your procedure.

## 2024-07-11 NOTE — TELEPHONE ENCOUNTER
Caller: Lesli Jackson   Reason for Reschedule/Cancellation (please be detailed, any staff messages or encounters to note?):     Per pt -- change date       Prior to reschedule please review:  Ordering Provider:enrrique   Sedation Determined: moderate   Does patient have any ASC Exclusions, please identify?: n       Notes on Cancelled Procedure:  Procedure:Lower Endoscopy [Colonoscopy]   Date: 09/24/2024  Location:[facility]; [address] [place] [address], [place] [address]  Surgeon:          Rescheduled: yes   Procedure: Lower Endoscopy [Colonoscopy]  Date: 10/29/2024  Location: [facility] [place]; [address] [place] [address], [place] [address]  Surgeon: Grant   Sedation Level Scheduled  moderate          Reason for Sedation Level per order   Prep/Instructions updated and sent: ricardo     Does patient need PAC or Pre -Op Rescheduled? : n       Send In - basket message to Panc - Albaro Pool if [facility] procedure is canceled or rescheduled: [ N/A, YES or NO] n

## 2024-08-04 ENCOUNTER — HEALTH MAINTENANCE LETTER (OUTPATIENT)
Age: 64
End: 2024-08-04

## 2024-09-02 ENCOUNTER — OFFICE VISIT (OUTPATIENT)
Dept: URGENT CARE | Facility: URGENT CARE | Age: 64
End: 2024-09-02
Payer: COMMERCIAL

## 2024-09-02 ENCOUNTER — APPOINTMENT (OUTPATIENT)
Dept: GENERAL RADIOLOGY | Facility: CLINIC | Age: 64
End: 2024-09-02
Attending: STUDENT IN AN ORGANIZED HEALTH CARE EDUCATION/TRAINING PROGRAM
Payer: COMMERCIAL

## 2024-09-02 ENCOUNTER — HOSPITAL ENCOUNTER (INPATIENT)
Facility: CLINIC | Age: 64
LOS: 4 days | Discharge: HOME | End: 2024-09-06
Attending: STUDENT IN AN ORGANIZED HEALTH CARE EDUCATION/TRAINING PROGRAM | Admitting: STUDENT IN AN ORGANIZED HEALTH CARE EDUCATION/TRAINING PROGRAM
Payer: COMMERCIAL

## 2024-09-02 VITALS
HEART RATE: 78 BPM | TEMPERATURE: 98.6 F | SYSTOLIC BLOOD PRESSURE: 152 MMHG | OXYGEN SATURATION: 96 % | RESPIRATION RATE: 28 BRPM | DIASTOLIC BLOOD PRESSURE: 80 MMHG

## 2024-09-02 DIAGNOSIS — R09.02 HYPOXIA: Primary | ICD-10-CM

## 2024-09-02 DIAGNOSIS — E78.5 HYPERLIPIDEMIA LDL GOAL <130: ICD-10-CM

## 2024-09-02 DIAGNOSIS — E11.65 TYPE 2 DIABETES MELLITUS WITH HYPERGLYCEMIA, WITHOUT LONG-TERM CURRENT USE OF INSULIN (H): Primary | ICD-10-CM

## 2024-09-02 DIAGNOSIS — R09.02 HYPOXIA: ICD-10-CM

## 2024-09-02 DIAGNOSIS — F17.200 SMOKER: ICD-10-CM

## 2024-09-02 DIAGNOSIS — J44.1 COPD EXACERBATION (H): ICD-10-CM

## 2024-09-02 LAB
ALBUMIN SERPL BCG-MCNC: 3.9 G/DL (ref 3.5–5.2)
ALP SERPL-CCNC: 98 U/L (ref 40–150)
ALT SERPL W P-5'-P-CCNC: 20 U/L (ref 0–50)
ANION GAP SERPL CALCULATED.3IONS-SCNC: 7 MMOL/L (ref 7–15)
AST SERPL W P-5'-P-CCNC: 38 U/L (ref 0–45)
BASOPHILS # BLD AUTO: 0.1 10E3/UL (ref 0–0.2)
BASOPHILS NFR BLD AUTO: 1 %
BILIRUB SERPL-MCNC: 0.6 MG/DL
BUN SERPL-MCNC: 10.5 MG/DL (ref 8–23)
CALCIUM SERPL-MCNC: 9.6 MG/DL (ref 8.8–10.4)
CHLORIDE SERPL-SCNC: 96 MMOL/L (ref 98–107)
CREAT SERPL-MCNC: 0.6 MG/DL (ref 0.51–0.95)
CREAT SERPL-MCNC: 0.6 MG/DL (ref 0.51–0.95)
EGFRCR SERPLBLD CKD-EPI 2021: >90 ML/MIN/1.73M2
EGFRCR SERPLBLD CKD-EPI 2021: >90 ML/MIN/1.73M2
EOSINOPHIL # BLD AUTO: 0.3 10E3/UL (ref 0–0.7)
EOSINOPHIL NFR BLD AUTO: 4 %
ERYTHROCYTE [DISTWIDTH] IN BLOOD BY AUTOMATED COUNT: 13.3 % (ref 10–15)
GLUCOSE SERPL-MCNC: 137 MG/DL (ref 70–99)
HCO3 SERPL-SCNC: 36 MMOL/L (ref 22–29)
HCT VFR BLD AUTO: 46.5 % (ref 35–47)
HGB BLD-MCNC: 15.6 G/DL (ref 11.7–15.7)
IMM GRANULOCYTES # BLD: 0.1 10E3/UL
IMM GRANULOCYTES NFR BLD: 1 %
LYMPHOCYTES # BLD AUTO: 1.6 10E3/UL (ref 0.8–5.3)
LYMPHOCYTES NFR BLD AUTO: 18 %
MCH RBC QN AUTO: 31.1 PG (ref 26.5–33)
MCHC RBC AUTO-ENTMCNC: 33.5 G/DL (ref 31.5–36.5)
MCV RBC AUTO: 93 FL (ref 78–100)
MONOCYTES # BLD AUTO: 0.6 10E3/UL (ref 0–1.3)
MONOCYTES NFR BLD AUTO: 7 %
NEUTROPHILS # BLD AUTO: 6.3 10E3/UL (ref 1.6–8.3)
NEUTROPHILS NFR BLD AUTO: 70 %
NRBC # BLD AUTO: 0 10E3/UL
NRBC BLD AUTO-RTO: 0 /100
NT-PROBNP SERPL-MCNC: 289 PG/ML (ref 0–900)
PLATELET # BLD AUTO: 172 10E3/UL (ref 150–450)
POTASSIUM SERPL-SCNC: 3.8 MMOL/L (ref 3.4–5.3)
PROT SERPL-MCNC: 7.8 G/DL (ref 6.4–8.3)
RBC # BLD AUTO: 5.02 10E6/UL (ref 3.8–5.2)
SODIUM SERPL-SCNC: 139 MMOL/L (ref 135–145)
WBC # BLD AUTO: 9 10E3/UL (ref 4–11)

## 2024-09-02 PROCEDURE — 83880 ASSAY OF NATRIURETIC PEPTIDE: CPT | Performed by: STUDENT IN AN ORGANIZED HEALTH CARE EDUCATION/TRAINING PROGRAM

## 2024-09-02 PROCEDURE — 250N000009 HC RX 250: Performed by: PHYSICIAN ASSISTANT

## 2024-09-02 PROCEDURE — 99207 PR INPT ADMISSION FROM CLINIC: CPT | Performed by: PHYSICIAN ASSISTANT

## 2024-09-02 PROCEDURE — 94640 AIRWAY INHALATION TREATMENT: CPT | Mod: 76

## 2024-09-02 PROCEDURE — 36415 COLL VENOUS BLD VENIPUNCTURE: CPT | Performed by: STUDENT IN AN ORGANIZED HEALTH CARE EDUCATION/TRAINING PROGRAM

## 2024-09-02 PROCEDURE — 85025 COMPLETE CBC W/AUTO DIFF WBC: CPT | Performed by: STUDENT IN AN ORGANIZED HEALTH CARE EDUCATION/TRAINING PROGRAM

## 2024-09-02 PROCEDURE — 250N000013 HC RX MED GY IP 250 OP 250 PS 637: Performed by: PHYSICIAN ASSISTANT

## 2024-09-02 PROCEDURE — 99223 1ST HOSP IP/OBS HIGH 75: CPT | Performed by: PHYSICIAN ASSISTANT

## 2024-09-02 PROCEDURE — 94640 AIRWAY INHALATION TREATMENT: CPT

## 2024-09-02 PROCEDURE — 120N000001 HC R&B MED SURG/OB

## 2024-09-02 PROCEDURE — 999N000157 HC STATISTIC RCP TIME EA 10 MIN

## 2024-09-02 PROCEDURE — 99285 EMERGENCY DEPT VISIT HI MDM: CPT | Mod: 25

## 2024-09-02 PROCEDURE — 250N000012 HC RX MED GY IP 250 OP 636 PS 637: Performed by: STUDENT IN AN ORGANIZED HEALTH CARE EDUCATION/TRAINING PROGRAM

## 2024-09-02 PROCEDURE — 71046 X-RAY EXAM CHEST 2 VIEWS: CPT

## 2024-09-02 PROCEDURE — 82040 ASSAY OF SERUM ALBUMIN: CPT | Performed by: STUDENT IN AN ORGANIZED HEALTH CARE EDUCATION/TRAINING PROGRAM

## 2024-09-02 PROCEDURE — 250N000009 HC RX 250: Performed by: STUDENT IN AN ORGANIZED HEALTH CARE EDUCATION/TRAINING PROGRAM

## 2024-09-02 PROCEDURE — 93005 ELECTROCARDIOGRAM TRACING: CPT

## 2024-09-02 RX ORDER — AMOXICILLIN 250 MG
1 CAPSULE ORAL 2 TIMES DAILY PRN
Status: DISCONTINUED | OUTPATIENT
Start: 2024-09-02 | End: 2024-09-06 | Stop reason: HOSPADM

## 2024-09-02 RX ORDER — NICOTINE 21 MG/24HR
1 PATCH, TRANSDERMAL 24 HOURS TRANSDERMAL DAILY PRN
Status: DISCONTINUED | OUTPATIENT
Start: 2024-09-02 | End: 2024-09-06 | Stop reason: HOSPADM

## 2024-09-02 RX ORDER — HYDROMORPHONE HCL IN WATER/PF 6 MG/30 ML
0.2 PATIENT CONTROLLED ANALGESIA SYRINGE INTRAVENOUS
Status: DISCONTINUED | OUTPATIENT
Start: 2024-09-02 | End: 2024-09-06 | Stop reason: HOSPADM

## 2024-09-02 RX ORDER — ONDANSETRON 4 MG/1
4 TABLET, ORALLY DISINTEGRATING ORAL EVERY 6 HOURS PRN
Status: DISCONTINUED | OUTPATIENT
Start: 2024-09-02 | End: 2024-09-06 | Stop reason: HOSPADM

## 2024-09-02 RX ORDER — POLYETHYLENE GLYCOL 3350 17 G/17G
17 POWDER, FOR SOLUTION ORAL 2 TIMES DAILY PRN
Status: DISCONTINUED | OUTPATIENT
Start: 2024-09-02 | End: 2024-09-06 | Stop reason: HOSPADM

## 2024-09-02 RX ORDER — IPRATROPIUM BROMIDE AND ALBUTEROL SULFATE 2.5; .5 MG/3ML; MG/3ML
3 SOLUTION RESPIRATORY (INHALATION)
Status: COMPLETED | OUTPATIENT
Start: 2024-09-02 | End: 2024-09-02

## 2024-09-02 RX ORDER — CARVEDILOL 6.25 MG/1
6.25 TABLET ORAL 2 TIMES DAILY WITH MEALS
Status: DISCONTINUED | OUTPATIENT
Start: 2024-09-02 | End: 2024-09-06 | Stop reason: HOSPADM

## 2024-09-02 RX ORDER — OXYCODONE HYDROCHLORIDE 5 MG/1
5 TABLET ORAL EVERY 4 HOURS PRN
Status: DISCONTINUED | OUTPATIENT
Start: 2024-09-02 | End: 2024-09-06 | Stop reason: HOSPADM

## 2024-09-02 RX ORDER — ACETAMINOPHEN 650 MG/1
650 SUPPOSITORY RECTAL EVERY 4 HOURS PRN
Status: DISCONTINUED | OUTPATIENT
Start: 2024-09-02 | End: 2024-09-06 | Stop reason: HOSPADM

## 2024-09-02 RX ORDER — IPRATROPIUM BROMIDE AND ALBUTEROL SULFATE 2.5; .5 MG/3ML; MG/3ML
3 SOLUTION RESPIRATORY (INHALATION) EVERY 4 HOURS PRN
Status: DISCONTINUED | OUTPATIENT
Start: 2024-09-02 | End: 2024-09-06 | Stop reason: HOSPADM

## 2024-09-02 RX ORDER — HYDRALAZINE HYDROCHLORIDE 10 MG/1
10 TABLET, FILM COATED ORAL EVERY 4 HOURS PRN
Status: DISCONTINUED | OUTPATIENT
Start: 2024-09-02 | End: 2024-09-06 | Stop reason: HOSPADM

## 2024-09-02 RX ORDER — NALOXONE HYDROCHLORIDE 0.4 MG/ML
0.2 INJECTION, SOLUTION INTRAMUSCULAR; INTRAVENOUS; SUBCUTANEOUS
Status: DISCONTINUED | OUTPATIENT
Start: 2024-09-02 | End: 2024-09-06 | Stop reason: HOSPADM

## 2024-09-02 RX ORDER — PREDNISONE 20 MG/1
40 TABLET ORAL ONCE
Status: COMPLETED | OUTPATIENT
Start: 2024-09-02 | End: 2024-09-02

## 2024-09-02 RX ORDER — HYDRALAZINE HYDROCHLORIDE 20 MG/ML
10 INJECTION INTRAMUSCULAR; INTRAVENOUS EVERY 4 HOURS PRN
Status: DISCONTINUED | OUTPATIENT
Start: 2024-09-02 | End: 2024-09-06 | Stop reason: HOSPADM

## 2024-09-02 RX ORDER — BISACODYL 10 MG
10 SUPPOSITORY, RECTAL RECTAL DAILY PRN
Status: DISCONTINUED | OUTPATIENT
Start: 2024-09-02 | End: 2024-09-06 | Stop reason: HOSPADM

## 2024-09-02 RX ORDER — ACETAMINOPHEN 325 MG/1
650 TABLET ORAL EVERY 4 HOURS PRN
Status: DISCONTINUED | OUTPATIENT
Start: 2024-09-02 | End: 2024-09-06 | Stop reason: HOSPADM

## 2024-09-02 RX ORDER — BENZOCAINE 20 %
6 GEL (GRAM) MUCOUS MEMBRANE DAILY
COMMUNITY

## 2024-09-02 RX ORDER — GUAIFENESIN 600 MG/1
1200 TABLET, EXTENDED RELEASE ORAL 2 TIMES DAILY
Status: DISCONTINUED | OUTPATIENT
Start: 2024-09-02 | End: 2024-09-06 | Stop reason: HOSPADM

## 2024-09-02 RX ORDER — PREDNISONE 20 MG/1
40 TABLET ORAL DAILY
Status: DISCONTINUED | OUTPATIENT
Start: 2024-09-03 | End: 2024-09-05

## 2024-09-02 RX ORDER — NALOXONE HYDROCHLORIDE 0.4 MG/ML
0.4 INJECTION, SOLUTION INTRAMUSCULAR; INTRAVENOUS; SUBCUTANEOUS
Status: DISCONTINUED | OUTPATIENT
Start: 2024-09-02 | End: 2024-09-06 | Stop reason: HOSPADM

## 2024-09-02 RX ORDER — PRAVASTATIN SODIUM 20 MG
20 TABLET ORAL EVERY EVENING
Status: DISCONTINUED | OUTPATIENT
Start: 2024-09-02 | End: 2024-09-06 | Stop reason: HOSPADM

## 2024-09-02 RX ORDER — ONDANSETRON 2 MG/ML
4 INJECTION INTRAMUSCULAR; INTRAVENOUS EVERY 6 HOURS PRN
Status: DISCONTINUED | OUTPATIENT
Start: 2024-09-02 | End: 2024-09-06 | Stop reason: HOSPADM

## 2024-09-02 RX ORDER — ENOXAPARIN SODIUM 100 MG/ML
40 INJECTION SUBCUTANEOUS EVERY 24 HOURS
Status: DISCONTINUED | OUTPATIENT
Start: 2024-09-03 | End: 2024-09-06 | Stop reason: HOSPADM

## 2024-09-02 RX ORDER — AMOXICILLIN 250 MG
2 CAPSULE ORAL 2 TIMES DAILY PRN
Status: DISCONTINUED | OUTPATIENT
Start: 2024-09-02 | End: 2024-09-06 | Stop reason: HOSPADM

## 2024-09-02 RX ORDER — LIDOCAINE 40 MG/G
CREAM TOPICAL
Status: DISCONTINUED | OUTPATIENT
Start: 2024-09-02 | End: 2024-09-06 | Stop reason: HOSPADM

## 2024-09-02 RX ORDER — IPRATROPIUM BROMIDE AND ALBUTEROL SULFATE 2.5; .5 MG/3ML; MG/3ML
3 SOLUTION RESPIRATORY (INHALATION)
Status: DISCONTINUED | OUTPATIENT
Start: 2024-09-02 | End: 2024-09-06 | Stop reason: HOSPADM

## 2024-09-02 RX ORDER — HYDROCHLOROTHIAZIDE 25 MG/1
25 TABLET ORAL DAILY
Status: DISCONTINUED | OUTPATIENT
Start: 2024-09-03 | End: 2024-09-06 | Stop reason: HOSPADM

## 2024-09-02 RX ORDER — CALCIUM CARBONATE 500 MG/1
1000 TABLET, CHEWABLE ORAL 4 TIMES DAILY PRN
Status: DISCONTINUED | OUTPATIENT
Start: 2024-09-02 | End: 2024-09-06 | Stop reason: HOSPADM

## 2024-09-02 RX ORDER — HYDROMORPHONE HCL IN WATER/PF 6 MG/30 ML
0.4 PATIENT CONTROLLED ANALGESIA SYRINGE INTRAVENOUS
Status: DISCONTINUED | OUTPATIENT
Start: 2024-09-02 | End: 2024-09-06 | Stop reason: HOSPADM

## 2024-09-02 RX ADMIN — IPRATROPIUM BROMIDE AND ALBUTEROL SULFATE 3 ML: .5; 3 SOLUTION RESPIRATORY (INHALATION) at 17:04

## 2024-09-02 RX ADMIN — PRAVASTATIN SODIUM 20 MG: 20 TABLET ORAL at 21:29

## 2024-09-02 RX ADMIN — PREDNISONE 40 MG: 20 TABLET ORAL at 15:50

## 2024-09-02 RX ADMIN — IPRATROPIUM BROMIDE AND ALBUTEROL SULFATE 3 ML: .5; 3 SOLUTION RESPIRATORY (INHALATION) at 15:41

## 2024-09-02 RX ADMIN — CARVEDILOL 6.25 MG: 6.25 TABLET, FILM COATED ORAL at 21:29

## 2024-09-02 RX ADMIN — IPRATROPIUM BROMIDE AND ALBUTEROL SULFATE 3 ML: .5; 3 SOLUTION RESPIRATORY (INHALATION) at 16:33

## 2024-09-02 RX ADMIN — IPRATROPIUM BROMIDE AND ALBUTEROL SULFATE 3 ML: .5; 3 SOLUTION RESPIRATORY (INHALATION) at 23:07

## 2024-09-02 RX ADMIN — GUAIFENESIN 1200 MG: 600 TABLET, EXTENDED RELEASE ORAL at 21:30

## 2024-09-02 ASSESSMENT — ACTIVITIES OF DAILY LIVING (ADL)
ADLS_ACUITY_SCORE: 36
ADLS_ACUITY_SCORE: 36
ADLS_ACUITY_SCORE: 35
ADLS_ACUITY_SCORE: 36
ADLS_ACUITY_SCORE: 35

## 2024-09-02 NOTE — H&P
[facility]  History and Physical - Hospitalist Service       Date of Admission:  9/2/2024  PRIMARY CARE PROVIDER:    Gretchen Vargas    Assessment & Plan   Lesli Jackson is a 63 year old female admitted on 9/2/2024 due to acute hypoxic respiratory failure secondary to suspected COPD exacerbation.      Past medical history significant for HTN, HLP, DM2, Tobacco use D/O, Obesity, Chronic bilateral lower extremity edema, Alcohol use D/O.      Patient presented to the ED via EMS due to hypoxia (O2 saturation of 84% on Room Air at Urgent Care).  EMS administered a DuoNeb and reported bilateral wheeze throughout the lung fields.      Patient reported that she has felt sick for ~ 1 week prior to admission with URI symptoms (sore throat, runny nose, and mostly nonproductive cough).  She has taken a COVID test twice and both were negative.  It was noted that she has bilateral lower extremity edema that is normal for her.   Notably, patient was seen at [facility] earlier on day of admission.  She had presented to the Urgent Care due to sudden onset of shortness of breath and noted oxygen saturations at 84-86% on room air.      Work-up in the ED included an EKG that showed sinus rhythm with left axis deviation and right bundle branch block.  CMP revealed a chloride of 96, CO2 of 36 glucose of 137 otherwise unremarkable.  CBC with differential was unremarkable.  ProBNP was unremarkable at 289.  Two-view chest x-ray showed normal heart size and pulmonary vascularity, clear lungs without evidence of pleural effusion but noted mild degenerative changes in the thoracic spine.    Patient received DuoNeb therapy x3 and 40 mg PO prednisone while in the ED.      Acute hypoxic hypercapnic respiratory failure  Suspected COPD exacerbation  *No formal diagnosis of COPD but symptoms consistent with COPD with acute shortness of breath, wheezing, somewhat productive cough and continued tobacco use.     *While evaluating the patient in the ED she was on 2 L/m of supplemental O2 and O2 saturation was 90%.  Trialed her off the supplemental O2 and while speaking with her and doing a physical exam her O2 saturation dropped to 86% and the supplemental O2 was replaced.    - Prednisone 40 mg/d.    - Scheduled DuoNebs and PRN.    - Mucinex 1200 mg BID.    - IS/Flutter valve use encouraged.    - Monitor O2 saturations.    - Supplemental O2 as need; wean as able.    - Home O2 assessment ordered.    - RCAT requested.    - Outpatient referral to Pulmonology at discharge.    Tobacco Use D/O   Patient currently smokes ~ 10 pack/day.    - Counseled regarding smoking cessation that should also continue with PCP.    - Nicoderm patch ordered.    HTN  - Resumed on PTA lisinopril 60 mg/d, Coreg 6.25 mg BID, hydrochlorothiazide 25 mg/d.  Hold parameters in place.      HLP  - Resumed on PTA lovastatin 10 mg at bedtime.      DM2  *A1c from 3/8/2024 elevated at 7.5%.  Patient does not appear to be on any medications.  Reviewed PCP note from 5/2024 in which she elected to proceed with diet and life style modifications.    - BMP ordered for 9/3.      Obesity   BMI ~ 37.61.    Increase in all-cause morbidity and mortality.   - Follow up with PCP regarding ongoing management.      Alcohol use D/O  *Patient reportedly consumes 3-4 drinks per day.    - Follow up with PCP.        Clinically Significant Risk Factors Present on Admission                  # Hypertension: Noted on problem list    # DMII: A1C = N/A within past 6 months            Diet: Regular  DVT Prophylaxis: Enoxaparin (Lovenox) SQ  Pryor Catheter: Not present  Lines: None     Cardiac Monitoring: None  Code Status: FULL CODE    Disposition Plan   Inpatient status.  Anticipate greater than 2 evening hospitalization while undergoing continued work-up/management of acute hypoxic respiratory failure and suspected COPD exacerbation.      Medically Ready for Discharge: Anticipated in  2-4 Days    The patient's care was discussed with the Bedside Nurse, Patient, Patient's Family, and Dr. Conde .  Reviewed ED notes and Urgent Care note    The patient has been discussed with Dr. Hicks, who agrees with the assessment and plan at this time.    Leo Gunter PA-C  [facility]  Securely message with the Vocera Web Console (learn more here)    ______________________________________________________________________    Chief Complaint   Shortness of breath    History is obtained from Dr. Conde, the patient and EMR.      History of Present Illness   Lesli Jackson is a 63 year old female admitted on 9/2/2024 due to acute hypoxic respiratory failure secondary to suspected COPD exacerbation.       Past medical history significant for HTN, HLP, DM2, Tobacco use D/O, Obesity, Chronic bilateral lower extremity edema, Alcohol use D/O.      Patient presented to the ED via EMS due to hypoxia (O2 saturation of 84% on Room Air at Urgent Care).  EMS administered a DuoNeb and reported bilateral wheeze throughout the lung fields.      Patient reported that she has felt sick for ~ 1 week prior to admission with URI symptoms (sore throat, runny nose, and mostly nonproductive cough).  She has taken a COVID test twice and both were negative.  It was noted that she has bilateral lower extremity edema that is normal for her.   Notably, patient was seen at [facility] earlier on day of admission.  She had presented to the Urgent Care due to sudden onset of shortness of breath and noted oxygen saturations at 84-86% on room air.      Work-up in the ED included an EKG that showed sinus rhythm with left axis deviation and right bundle branch block.  CMP revealed a chloride of 96, CO2 of 36 glucose of 137 otherwise unremarkable.  CBC with differential was unremarkable.  ProBNP was unremarkable at 289.  Two-view chest x-ray showed normal heart size and pulmonary vascularity, clear lungs  without evidence of pleural effusion but noted mild degenerative changes in the thoracic spine.    Patient received DuoNeb therapy x3 and 40 mg PO prednisone while in the ED.      Patient was seen in the ED where she was seated on the gurney upon arrival.  Patient's significant other was present in the room.  She is currently on 2 L/min of supplemental oxygen via nasal cannula with O2 saturation at 90%.  We discussed/reviewed events that led to patient's presentation to the ED.  We discussed that she was evaluated at urgent care.  She indicated that she had taken 2 at home COVID test that were both negative.    Upon questioning, patient indicated that she did have a fever at some point this week where she had taken her own temperature and it was 100  F.  She indicated that she has had a slight cough but has been taking Mucinex which has been decreasing the frequency.  She indicated it is not a productive cough.  She has been experiencing upper respiratory symptoms with a sore throat, runny nose and slight cough.  Typically, she prefers to sleep laying flat however the night prior to admission she had to prop yourself up with pillows.  She also indicated that within the last week she was feeling slightly constipated but this is since resolved.    Patient resides in a house with her significant other (Winston) in [place], [place].  She indicated that she smokes up to 10 cigarettes/day.  She consumes 3-4 alcoholic beverages every night.  She does not utilize recreational drugs.  She does not use a cane, walker, supplemental oxygen or CPAP machine.    Discussed and reviewed CODE STATUS and patient elected to be FULL CODE.      Past Medical History    I have reviewed this patient's medical history and updated it with pertinent information if needed.   Past Medical History:   Diagnosis Date    Baker's cyst of knee     Lt (7.3x4.3x1.6 cm)    Elevated glucose     Hyperlipidemia LDL goal <130     Hypertension goal BP  (blood pressure) < 140/90     Idiopathic edema     NONSPECIFIC MEDICAL HISTORY     nephrolithiasis    Personal history of smoking     quit in 2010    Secondary lymphedema 8/4/19    DX by [facility] [place], lose wt. at this point    Unspecified iridocyclitis    HTN, HLP, DM2, Tobacco use D/O, Obesity, Chronic bilateral lower extremity edema, Alcohol use D/O.      Prior to Admission Medications   Prior to Admission Medications   Prescriptions Last Dose Informant Patient Reported? Taking?   Flaxseed, Linseed, (FLAXSEED OIL) 1000 MG CAPS   No No   Sig: Take 2 capsules by mouth daily.   MULTIPLE VIT-MIN-CALCIUM-FA PO   Yes No   Sig: Take  by mouth.   carvedilol (COREG) 6.25 MG tablet   No No   Sig: Take 1 tablet (6.25 mg) by mouth 2 times daily (with meals) for 360 days   hydrochlorothiazide (HYDRODIURIL) 50 MG tablet   No No   Sig: Take 0.5 tablets (25 mg) by mouth daily   lisinopril (ZESTRIL) 40 MG tablet   No No   Sig: Take 1.5 tablets (60 mg) by mouth daily   lovastatin (MEVACOR) 20 MG tablet   No No   Sig: Take 0.5 tablets (10 mg) by mouth at bedtime TAKE 1/2 TABLET(10 MG) BY MOUTH EVERY NIGHT AT BEDTIME   methylcellulose (CITRUCEL) powder   No No   Sig: Take 0.85 g (3 teaspoonful) by mouth daily      Facility-Administered Medications: None     Allergies   Allergies   Allergen Reactions    Amoxicillin Rash       Physical Exam   Vital Signs: Temp: 97.5  F (36.4  C) Temp src: Temporal BP: (!) 148/89 Pulse: 70   Resp: 13 SpO2: 100 % O2 Device: None (Room air)    Weight: 0 lbs 0 oz    Constitutional: Awake, alert, cooperative, no apparent distress.    ENT: Normocephalic, without obvious abnormality, atraumatic, oral pharynx with moist mucus membranes.  Eyes extra occular movements intact.  Normal sclera.    Neck: Supple, symmetrical, trachea midline, no adenopathy.  Pulmonary: 2 L/m of supplemental O2 with saturation at 90%.  When this was removed O2 saturation dropped to 86%.  No increased work of breathing.  Right  sided inspiratory and prolonged expiratory wheeze appreciated.  Left sided prolonged expiratory wheeze appreciated.    Cardiovascular: Regular rate and rhythm, normal S1 and S2, no S3 or S4, and no murmur noted.  GI: Normal bowel sounds, soft, non-distended, non-tender.  Obese.  Skin/Integumen: Visualized skin appeared clear.  Neuro: CN II-XII grossly intact.  Upper and lower extremities strength, coordination and sensation intact bilaterally.    Psych:  Alert and oriented x 3. Normal affect.  Extremities: Bilateral lower extremity edema noted, and calves are non-tender to palpation bilaterally.    Medical Decision Making       Please see A&P for additional details of medical decision making.  GREATER THAN 75 MINUTES SPENT BY ME on the date of service doing chart review, history, exam, documentation & further activities per the note.       Data   Data reviewed today: I reviewed all medications, new labs and imaging results over the last 24 hours. I personally reviewed the EKG tracing showing sinus rhythm with right BBB .      I have personally reviewed the following data over the past 24 hrs:    9.0  \   15.6   / 172     139 96 (L) 10.5 /  137 (H)   3.8 36 (H) 0.60 \     ALT: 20 AST: 38 AP: 98 TBILI: 0.6   ALB: 3.9 TOT PROTEIN: 7.8 LIPASE: N/A     Trop: N/A BNP: 289       Imaging results reviewed over the past 24 hrs:   Recent Results (from the past 24 hour(s))   XR Chest 2 Views    Narrative    EXAM: XR CHEST 2 VIEWS  LOCATION: [facility]  DATE: 9/2/2024    INDICATION: Shortness of breath. Wheezing.  COMPARISON: 6/1/2008      Impression    IMPRESSION: Normal heart size and pulmonary vascularity. Lungs are clear. No pleural effusions. Mild degenerative changes in the thoracic spine.

## 2024-09-02 NOTE — PROGRESS NOTES
SUBJECTIVE:   Lesli Jackson is a 63 year old female presenting with a chief complaint of   Chief Complaint   Patient presents with    Shortness of Breath       She is an established patient of [facility].    Patient is presenting with sudden onset of SOB.  Saturations are 84-86 percent in the office since yesterday.  Smoker  Denies any lung problems.    Review of Systems    Past Medical History:   Diagnosis Date    Baker's cyst of knee     Lt (7.3x4.3x1.6 cm)    Elevated glucose     Hyperlipidemia LDL goal <130     Hypertension goal BP (blood pressure) < 140/90     Idiopathic edema     NONSPECIFIC MEDICAL HISTORY     nephrolithiasis    Personal history of smoking     quit in 2010    Secondary lymphedema 8/4/19    DX by [facility], lose wt. at this point    Unspecified iridocyclitis      Family History   Problem Relation Age of Onset    C.A.D. Father     Diabetes Father     Cerebrovascular Disease Father         stroke    Eye Disorder Father         cataracts    Heart Disease Father     Hypertension Mother     Arthritis Mother     Cancer Mother         lymphoma    Abdominal Aortic Aneurysm Brother 63        nonsmoker    Prostate Cancer Maternal Uncle     Allergies Sister     Eye Disorder Other         iritis    Lipids Other         one of her parents    Thyroid Disease Sister      Current Outpatient Medications   Medication Sig Dispense Refill    carvedilol (COREG) 6.25 MG tablet Take 1 tablet (6.25 mg) by mouth 2 times daily (with meals) for 360 days 60 tablet 11    Flaxseed, Linseed, (FLAXSEED OIL) 1000 MG CAPS Take 2 capsules by mouth daily. 90 capsule 0    hydrochlorothiazide (HYDRODIURIL) 50 MG tablet Take 0.5 tablets (25 mg) by mouth daily 45 tablet 3    lisinopril (ZESTRIL) 40 MG tablet Take 1.5 tablets (60 mg) by mouth daily 45 tablet 11    lovastatin (MEVACOR) 20 MG tablet Take 0.5 tablets (10 mg) by mouth at bedtime TAKE 1/2 TABLET(10 MG) BY MOUTH EVERY NIGHT AT BEDTIME 45 tablet 1    methylcellulose  (CITRUCEL) powder Take 0.85 g (3 teaspoonful) by mouth daily      MULTIPLE VIT-MIN-CALCIUM-FA PO Take  by mouth.       Social History     Tobacco Use    Smoking status: Former     Current packs/day: 0.00     Average packs/day: 1 pack/day for 20.0 years (20.0 ttl pk-yrs)     Types: Cigarettes     Start date: 1990     Quit date: 2010     Years since quittin.6    Smokeless tobacco: Former    Tobacco comments:     5 cigarettes day or less   Substance Use Topics    Alcohol use: Yes     Comment: 1-2  beer daily on average       OBJECTIVE  LMP 2012     Physical Exam  Vitals reviewed.   Constitutional:       Appearance: Normal appearance. She is obese.      Comments: Talking in near complete sentences.  Appears SOB.   Cardiovascular:      Rate and Rhythm: Normal rate.   Pulmonary:      Breath sounds: Wheezing present.   Neurological:      Mental Status: She is alert.           O2 via NC to keep saturations above 90.  Patient sent via ambulance to ED for evaluation and treatment.

## 2024-09-02 NOTE — ED PROVIDER NOTES
Emergency Department Note      History of Present Illness     Chief Complaint   Shortness of Breath      HPI   Lesli Jackson is a 63 year old female history of hypertension, hyperlipidemia, smoking, presenting from clinic for shortness of breath.  Patient has had a cold for the last week including sore throat, runny nose, cough, mostly nonproductive.  Over the last 2 days she has felt significantly short of breath.  Had 2 negative COVID tests.  Today she went to urgent care and her O2 saturations were 84 to 86%.  She was also diffusely wheezy.  EMS provided DuoNeb.  Patient states she typically gets like this when she gets a cold but usually she improves and this episode did not.  No chest pain or abdominal pain.  At baseline she has significant swelling of her lower extremities bilaterally but that is unchanged today and she has no leg pain.    Independent Historian   None    Review of External Notes   Urgent care note from today for hypoxia.    Past Medical History     Medical History and Problem List   Past Medical History:   Diagnosis Date    Baker's cyst of knee     Elevated glucose     Hyperlipidemia LDL goal <130     Hypertension goal BP (blood pressure) < 140/90     Idiopathic edema     NONSPECIFIC MEDICAL HISTORY     Personal history of smoking     Secondary lymphedema 8/4/19    Unspecified iridocyclitis        Medications   carvedilol (COREG) 6.25 MG tablet  Flaxseed, Linseed, (FLAXSEED OIL) 1000 MG CAPS  hydrochlorothiazide (HYDRODIURIL) 50 MG tablet  lisinopril (ZESTRIL) 40 MG tablet  lovastatin (MEVACOR) 20 MG tablet  methylcellulose (CITRUCEL) powder  MULTIPLE VIT-MIN-CALCIUM-FA PO        Surgical History   Past Surgical History:   Procedure Laterality Date    COLONOSCOPY  2/16/11    repeat 3 yrs, one inconclusive area       Physical Exam     Patient Vitals for the past 24 hrs:   BP Temp Temp src Pulse Resp SpO2   09/02/24 1806 -- -- -- 70 12 92 %   09/02/24 1800 -- -- -- 79 15 (!) 89 %   09/02/24 1545  (!) 148/89 -- -- 70 13 100 %   09/02/24 1532 -- -- -- -- -- 96 %   09/02/24 1531 -- -- -- -- -- 97 %   09/02/24 1530 -- -- -- -- -- 95 %   09/02/24 1529 (!) 174/104 -- -- 75 -- 95 %   09/02/24 1528 (!) 174/104 97.5  F (36.4  C) Temporal 74 18 95 %     Physical Exam  GENERAL: Patient well-nourished.  Slightly short of breath with speaking.  HEAD: Atraumatic.  NECK: No rigidity  CV: RRR, no murmurs, rubs or gallops  PULM: Mild accessory muscle use.  Diffuse inspiratory and expiratory wheezing bilaterally.  ABD: Soft, nontender, nondistended, no guarding  DERM: No rash. Skin warm and dry  EXTREMITY: Bilateral lower extremities are enlarged but there is no tenderness or pitting edema.  VASCULAR: Symmetric pulses bilaterally      Diagnostics     Lab Results   Labs Ordered and Resulted from Time of ED Arrival to Time of ED Departure   COMPREHENSIVE METABOLIC PANEL - Abnormal       Result Value    Sodium 139      Potassium 3.8      Carbon Dioxide (CO2) 36 (*)     Anion Gap 7      Urea Nitrogen 10.5      Creatinine 0.60      GFR Estimate >90      Calcium 9.6      Chloride 96 (*)     Glucose 137 (*)     Alkaline Phosphatase 98      AST 38      ALT 20      Protein Total 7.8      Albumin 3.9      Bilirubin Total 0.6     NT PROBNP INPATIENT - Normal    N terminal Pro BNP Inpatient 289     CBC WITH PLATELETS AND DIFFERENTIAL    WBC Count 9.0      RBC Count 5.02      Hemoglobin 15.6      Hematocrit 46.5      MCV 93      MCH 31.1      MCHC 33.5      RDW 13.3      Platelet Count 172      % Neutrophils 70      % Lymphocytes 18      % Monocytes 7      % Eosinophils 4      % Basophils 1      % Immature Granulocytes 1      NRBCs per 100 WBC 0      Absolute Neutrophils 6.3      Absolute Lymphocytes 1.6      Absolute Monocytes 0.6      Absolute Eosinophils 0.3      Absolute Basophils 0.1      Absolute Immature Granulocytes 0.1      Absolute NRBCs 0.0         Imaging   XR Chest 2 Views   Final Result   IMPRESSION: Normal heart size and  pulmonary vascularity. Lungs are clear. No pleural effusions. Mild degenerative changes in the thoracic spine.          EKG   ECG interpreted by me.  Time 1536  NSR at 65. No ST elevation or depression.  Left axis deviation.  Right bundle branch block.  .  .  QTc 457.      Independent Interpretation   Chest x-ray-no pneumonia or increased pulm vascular congestion.    ED Course      Medications Administered   Medications   ipratropium - albuterol 0.5 mg/2.5 mg/3 mL (DUONEB) neb solution 3 mL (3 mLs Nebulization $Given 9/2/24 1704)   predniSONE (DELTASONE) tablet 40 mg (40 mg Oral $Given 9/2/24 1550)       Procedures   Procedures     Discussion of Management   I discussed admission and the plan of care with the Hospitalist ROSIO Mullen      ED Course        Additional Documentation  None    Medical Decision Making / Diagnosis        MDM   Lesli Jackson is a 63 year old female     Symptoms most consistent with new onset acute COPD exacerbation.     Chronic conditions complicating -smoking.    DDx considered pneumonia, PE, CHF, anaphylaxis, however evaluation not consistent with these etiologies.    Chest x-ray independently interpreted - negative for acute process.     Given nebulized duonebs and prednisone.    Patient feeling improved and she looks better.  Not requiring BiPAP nor intubation.  However, off oxygen her O2 sats are in the high 80s to 90% at rest.    Therefore, I think patient could benefit from admission.    No fever, white count, or productive cough, therefore we will hold on antibiotics.    Counseled on smoking cessation.    Discussed with hospitalist who will admit patient for further treatment.      Disposition   The patient was admitted to the hospital.     Diagnosis     ICD-10-CM    1. COPD exacerbation (H)  J44.1       2. Hypoxia  R09.02            Discharge Medications   New Prescriptions    No medications on file         MD Amaya Sahu Kevin, MD  09/02/24 8409

## 2024-09-02 NOTE — ED NOTES
[facility]  ED Nurse Handoff Report    ED Chief complaint: Shortness of Breath      ED Diagnosis:   Final diagnoses:   COPD exacerbation (H)   Hypoxia       Code Status: Assumed full - admitting MD to establish with patient.    Allergies:   Allergies   Allergen Reactions    Amoxicillin Rash       Patient Story: Sick for the last week, URI symptoms. Walked into clinic at 84% on room air. Bilateral low extremity edema. Duo neb en route to ED by EMS. Wheezing throughout bilateral lungs.     Focused Assessment:  AxOx4, flat affect, requiring 2L NC to maintain 92% O2 sats; calm and cooperative with interventions    Treatments and/or interventions provided: IV placed, labs, Xray, Duonebs x3, supplemental oxygen    Patient's response to treatments and/or interventions: See results; see MAR    To be done/followed up on inpatient unit:  See orders    Does this patient have any cognitive concerns?:  No    Activity level - Baseline/Home:  Independent  Activity Level - Current:   Stand with Assist    Patient's Preferred language: English   Needed?: No    Isolation: None  Infection: Not Applicable  Patient tested for COVID 19 prior to admission: YES  Bariatric?: No    Vital Signs:   Vitals:    09/02/24 1532 09/02/24 1545 09/02/24 1800 09/02/24 1806   BP:  (!) 148/89     Pulse:  70 79 70   Resp:  13 15 12   Temp:       TempSrc:       SpO2: 96% 100% (!) 89% 92%       Cardiac Rhythm:     Was the PSS-3 completed:   Yes  What interventions are required if any?               Family Comments: Partner bedside  OBS brochure/video discussed/provided to patient/family: N/A              Name of person given brochure if not patient:               Relationship to patient:     For the majority of the shift this patient's behavior was Green.   No behavioral interventions performed.    ED NURSE PHONE NUMBER: *[phone]

## 2024-09-02 NOTE — ED TRIAGE NOTES
Sick for the last week, URI symptoms. Walked into clinic at 84% on room air. Bilateral low extremity edema. Duo neb en route to ED by EMS. Wheezing throughout bilateral lungs.      Triage Assessment (Adult)       Row Name 09/02/24 1527          Triage Assessment    Airway WDL WDL        Cognitive/Neuro/Behavioral WDL    Cognitive/Neuro/Behavioral WDL WDL

## 2024-09-02 NOTE — PHARMACY-ADMISSION MEDICATION HISTORY
Pharmacist Admission Medication History    Admission medication history is complete. The information provided in this note is only as accurate as the sources available at the time of the update.    Information Source(s): Patient and CareEverywhere/SureScripts via in-person    Pertinent Information: none    Changes made to PTA medication list:  Added: None  Deleted: None  Changed:   Flaxseed 1000mg 2 capsules daily --> 6 capsules    Allergies reviewed with patient and updates made in EHR: yes    Medication History Completed By: Milly Gutierrez RPH 9/2/2024 6:54 PM    PTA Med List   Medication Sig Last Dose    carvedilol (COREG) 6.25 MG tablet Take 1 tablet (6.25 mg) by mouth 2 times daily (with meals) for 360 days 9/2/2024 at AM    Flaxseed, Linseed, 1000 MG CAPS Take 6 capsules by mouth daily. 9/2/2024 at AM    hydrochlorothiazide (HYDRODIURIL) 50 MG tablet Take 0.5 tablets (25 mg) by mouth daily 9/2/2024 at AM    lisinopril (ZESTRIL) 40 MG tablet Take 1.5 tablets (60 mg) by mouth daily 9/2/2024 at AM    lovastatin (MEVACOR) 20 MG tablet Take 0.5 tablets (10 mg) by mouth at bedtime TAKE 1/2 TABLET(10 MG) BY MOUTH EVERY NIGHT AT BEDTIME (Patient taking differently: Take 10 mg by mouth at bedtime.) 9/1/2024 at HS    methylcellulose (CITRUCEL) powder Take 0.85 g (3 teaspoonful) by mouth daily 9/2/2024 at AM    MULTIPLE VIT-MIN-CALCIUM-FA PO Take 1 tablet by mouth daily. 9/2/2024 at AM

## 2024-09-02 NOTE — ED NOTES
Bed: ST02  Expected date:   Expected time:   Means of arrival:   Comments:  Audrey [address] 63F resp distress

## 2024-09-03 ENCOUNTER — APPOINTMENT (OUTPATIENT)
Dept: ULTRASOUND IMAGING | Facility: CLINIC | Age: 64
End: 2024-09-03
Attending: NURSE PRACTITIONER
Payer: COMMERCIAL

## 2024-09-03 ENCOUNTER — APPOINTMENT (OUTPATIENT)
Dept: CT IMAGING | Facility: CLINIC | Age: 64
End: 2024-09-03
Attending: NURSE PRACTITIONER
Payer: COMMERCIAL

## 2024-09-03 LAB
ANION GAP SERPL CALCULATED.3IONS-SCNC: 8 MMOL/L (ref 7–15)
ATRIAL RATE - MUSE: 65 BPM
BUN SERPL-MCNC: 9.8 MG/DL (ref 8–23)
CALCIUM SERPL-MCNC: 9.6 MG/DL (ref 8.8–10.4)
CHLORIDE SERPL-SCNC: 95 MMOL/L (ref 98–107)
CREAT SERPL-MCNC: 0.58 MG/DL (ref 0.51–0.95)
D DIMER PPP FEU-MCNC: 2.81 UG/ML FEU (ref 0–0.5)
DIASTOLIC BLOOD PRESSURE - MUSE: NORMAL MMHG
EGFRCR SERPLBLD CKD-EPI 2021: >90 ML/MIN/1.73M2
ERYTHROCYTE [DISTWIDTH] IN BLOOD BY AUTOMATED COUNT: 13.3 % (ref 10–15)
FLUAV RNA SPEC QL NAA+PROBE: NEGATIVE
FLUBV RNA RESP QL NAA+PROBE: NEGATIVE
GLUCOSE SERPL-MCNC: 143 MG/DL (ref 70–99)
HBA1C MFR BLD: 7.1 %
HCO3 SERPL-SCNC: 34 MMOL/L (ref 22–29)
HCT VFR BLD AUTO: 43.1 % (ref 35–47)
HGB BLD-MCNC: 14.4 G/DL (ref 11.7–15.7)
INTERPRETATION ECG - MUSE: NORMAL
MCH RBC QN AUTO: 31.1 PG (ref 26.5–33)
MCHC RBC AUTO-ENTMCNC: 33.4 G/DL (ref 31.5–36.5)
MCV RBC AUTO: 93 FL (ref 78–100)
P AXIS - MUSE: 45 DEGREES
PLATELET # BLD AUTO: 166 10E3/UL (ref 150–450)
POTASSIUM SERPL-SCNC: 3.5 MMOL/L (ref 3.4–5.3)
PR INTERVAL - MUSE: 200 MS
QRS DURATION - MUSE: 124 MS
QT - MUSE: 440 MS
QTC - MUSE: 457 MS
R AXIS - MUSE: -82 DEGREES
RBC # BLD AUTO: 4.63 10E6/UL (ref 3.8–5.2)
RSV RNA SPEC NAA+PROBE: NEGATIVE
SARS-COV-2 RNA RESP QL NAA+PROBE: NEGATIVE
SODIUM SERPL-SCNC: 137 MMOL/L (ref 135–145)
SYSTOLIC BLOOD PRESSURE - MUSE: NORMAL MMHG
T AXIS - MUSE: 22 DEGREES
VENTRICULAR RATE- MUSE: 65 BPM
WBC # BLD AUTO: 8.9 10E3/UL (ref 4–11)

## 2024-09-03 PROCEDURE — 999N000157 HC STATISTIC RCP TIME EA 10 MIN

## 2024-09-03 PROCEDURE — 83036 HEMOGLOBIN GLYCOSYLATED A1C: CPT | Performed by: NURSE PRACTITIONER

## 2024-09-03 PROCEDURE — 36415 COLL VENOUS BLD VENIPUNCTURE: CPT | Performed by: NURSE PRACTITIONER

## 2024-09-03 PROCEDURE — 93970 EXTREMITY STUDY: CPT

## 2024-09-03 PROCEDURE — 250N000011 HC RX IP 250 OP 636: Performed by: NURSE PRACTITIONER

## 2024-09-03 PROCEDURE — 87637 SARSCOV2&INF A&B&RSV AMP PRB: CPT | Performed by: NURSE PRACTITIONER

## 2024-09-03 PROCEDURE — 250N000009 HC RX 250: Performed by: PHYSICIAN ASSISTANT

## 2024-09-03 PROCEDURE — 250N000013 HC RX MED GY IP 250 OP 250 PS 637: Performed by: PHYSICIAN ASSISTANT

## 2024-09-03 PROCEDURE — 250N000012 HC RX MED GY IP 250 OP 636 PS 637: Performed by: PHYSICIAN ASSISTANT

## 2024-09-03 PROCEDURE — 120N000001 HC R&B MED SURG/OB

## 2024-09-03 PROCEDURE — 85027 COMPLETE CBC AUTOMATED: CPT | Performed by: PHYSICIAN ASSISTANT

## 2024-09-03 PROCEDURE — 36415 COLL VENOUS BLD VENIPUNCTURE: CPT | Performed by: PHYSICIAN ASSISTANT

## 2024-09-03 PROCEDURE — 250N000009 HC RX 250: Performed by: NURSE PRACTITIONER

## 2024-09-03 PROCEDURE — 94640 AIRWAY INHALATION TREATMENT: CPT | Mod: 76

## 2024-09-03 PROCEDURE — 71275 CT ANGIOGRAPHY CHEST: CPT

## 2024-09-03 PROCEDURE — 85379 FIBRIN DEGRADATION QUANT: CPT | Performed by: NURSE PRACTITIONER

## 2024-09-03 PROCEDURE — 99232 SBSQ HOSP IP/OBS MODERATE 35: CPT | Performed by: NURSE PRACTITIONER

## 2024-09-03 PROCEDURE — 250N000011 HC RX IP 250 OP 636: Performed by: PHYSICIAN ASSISTANT

## 2024-09-03 PROCEDURE — 94640 AIRWAY INHALATION TREATMENT: CPT

## 2024-09-03 PROCEDURE — 80048 BASIC METABOLIC PNL TOTAL CA: CPT | Performed by: PHYSICIAN ASSISTANT

## 2024-09-03 RX ORDER — IOPAMIDOL 755 MG/ML
80 INJECTION, SOLUTION INTRAVASCULAR ONCE
Status: COMPLETED | OUTPATIENT
Start: 2024-09-03 | End: 2024-09-03

## 2024-09-03 RX ADMIN — CARVEDILOL 6.25 MG: 6.25 TABLET, FILM COATED ORAL at 09:11

## 2024-09-03 RX ADMIN — GUAIFENESIN 1200 MG: 600 TABLET, EXTENDED RELEASE ORAL at 19:19

## 2024-09-03 RX ADMIN — IPRATROPIUM BROMIDE AND ALBUTEROL SULFATE 3 ML: .5; 3 SOLUTION RESPIRATORY (INHALATION) at 14:50

## 2024-09-03 RX ADMIN — CARVEDILOL 6.25 MG: 6.25 TABLET, FILM COATED ORAL at 19:19

## 2024-09-03 RX ADMIN — PREDNISONE 40 MG: 20 TABLET ORAL at 09:11

## 2024-09-03 RX ADMIN — ENOXAPARIN SODIUM 40 MG: 40 INJECTION SUBCUTANEOUS at 09:12

## 2024-09-03 RX ADMIN — HYDROCHLOROTHIAZIDE 25 MG: 25 TABLET ORAL at 09:11

## 2024-09-03 RX ADMIN — GUAIFENESIN 1200 MG: 600 TABLET, EXTENDED RELEASE ORAL at 09:11

## 2024-09-03 RX ADMIN — PRAVASTATIN SODIUM 20 MG: 20 TABLET ORAL at 19:19

## 2024-09-03 RX ADMIN — IPRATROPIUM BROMIDE AND ALBUTEROL SULFATE 3 ML: .5; 3 SOLUTION RESPIRATORY (INHALATION) at 07:34

## 2024-09-03 RX ADMIN — SODIUM CHLORIDE 100 ML: 9 INJECTION, SOLUTION INTRAVENOUS at 17:32

## 2024-09-03 RX ADMIN — IPRATROPIUM BROMIDE AND ALBUTEROL SULFATE 3 ML: .5; 3 SOLUTION RESPIRATORY (INHALATION) at 18:02

## 2024-09-03 RX ADMIN — LISINOPRIL 60 MG: 40 TABLET ORAL at 19:19

## 2024-09-03 RX ADMIN — IOPAMIDOL 80 ML: 755 INJECTION, SOLUTION INTRAVENOUS at 17:32

## 2024-09-03 ASSESSMENT — ACTIVITIES OF DAILY LIVING (ADL)
ADLS_ACUITY_SCORE: 18
DEPENDENT_IADLS:: INDEPENDENT
ADLS_ACUITY_SCORE: 18

## 2024-09-03 NOTE — PLAN OF CARE
PRIMARY Concern: Acute hypoxic respiratory failure. Suspected COPD exacerbation  SAFETY RISK Concerns (fall risk, behaviors, etc.):   Isolation/Type: N/A  Tests/Procedures for NEXT shift: Home O2 consult  Consults? (Pending/following, signed-off?)   Where is patient from? (Home, TCU, etc.): Home  Other Important info for NEXT shift: Daily alcohol intake. CIWA ordered  Anticipated DC date & active delays: TBD  _____________________________________________________________________________  SUMMARY NOTE:  Orientation/Cognitive: A&Ox4  Observation Goals (Met/ Not Met): Inpatient   Mobility Level/Assist Equipment: Independent  Antibiotics & Plan (IV/po, length of tx left):   Pain Management: Denies  Tele/VS/O2: HTN upon arrival to unit. BP coming down after scheduled BP medications. 2L NC  ABNL Lab/BG: CO2: 36  Diet: Vegetarian  Bowel/Bladder: Continent  Skin Concerns: BLE edema  Drains/Devices: PIV SL. NC  Patient Stated Goal for Today: Pt hopeful to discharge tomorrow 9/3

## 2024-09-03 NOTE — PLAN OF CARE
Goal Outcome Evaluation:      Plan of Care Reviewed With: patient  Outcome Evaluation: anticipate home / no CM needs

## 2024-09-03 NOTE — PLAN OF CARE
Goal Outcome Evaluation:  PRIMARY Concern: Acute hypoxic respiratory failure. Suspected COPD exacerbation  SAFETY RISK Concerns (fall risk, behaviors, etc.):   Isolation/Type: n/a  Tests/Procedures for NEXT shift: Home O2 consult  Consults? (Pending/following, signed-off?)   Where is patient from? (Home, TCU, etc.): Home  Other Important info for NEXT shift: Daily alcohol intake. CIWA 0  Anticipated DC date & active delays: TBD  _____________________________________________________________________________  SUMMARY NOTE:  Orientation/Cognitive: A&Ox4  Observation Goals (Met/ Not Met): Inpatient   Mobility Level/Assist Equipment: Independent  Antibiotics & Plan (IV/po, length of tx left):   Pain Management: Denies  Tele/VS/O2:  VSS 1 L NC  ABNL Lab/BG: CO2: 36, covid/flu swab neg  Diet: Vegetarian  Bowel/Bladder: Continent  Skin Concerns: BLE edema, +3 edema in left leg, +2 in right leg  Drains/Devices: PIV SL.  Patient Stated Goal for Today: Hopeful to discharge tomorrow 9/4

## 2024-09-03 NOTE — PROVIDER NOTIFICATION
MD Notification    Notified Person: MD    Notified Person Name: Lashay    Notification Date/Time: 3:25pm    Notification Interaction: Santino    Purpose of Notification: Should we be doing blood sugar checks due to prednisone?     Orders Received: Hold off for now    Comments:

## 2024-09-03 NOTE — PLAN OF CARE
Goal Outcome Evaluation:  PRIMARY Concern: Acute hypoxic respiratory failure. Suspected COPD exacerbation  SAFETY RISK Concerns (fall risk, behaviors, etc.):   Isolation/Type: N/A  Tests/Procedures for NEXT shift: Home O2 consult  Consults? (Pending/following, signed-off?)   Where is patient from? (Home, TCU, etc.): Home  Other Important info for NEXT shift: Daily alcohol intake. CIWA 0  Anticipated DC date & active delays: TBD  _____________________________________________________________________________  SUMMARY NOTE:  Orientation/Cognitive: A&Ox4  Observation Goals (Met/ Not Met): Inpatient   Mobility Level/Assist Equipment: Independent  Antibiotics & Plan (IV/po, length of tx left):   Pain Management: Denies  Tele/VS/O2:  VSS 2L NC  ABNL Lab/BG: CO2: 36  Diet: Vegetarian  Bowel/Bladder: Continent  Skin Concerns: BLE edema  Drains/Devices: PIV SL.  Patient Stated Goal for Today: Hopeful to discharge tomorrow 9/3

## 2024-09-03 NOTE — CONSULTS
Care Management Initial Consult    General Information  Assessment completed with: Lesli Gutierres  Type of CM/SW Visit: Initial Assessment  Primary Care Provider verified and updated as needed: Yes (Vargas, Gretchen Bridget [phone])   Readmission within the last 30 days: no previous admission in last 30 days      Reason for Consult: discharge planning  Advance Care Planning:    no ACP document, requested & was given blank copy     Communication Assessment  Patient's communication style: spoken language (English or Bilingual)    Hearing Difficulty or Deaf: no   Wear Glasses or Blind: no    Cognitive  Cognitive/Neuro/Behavioral: WDL                      Living Environment:   People in home: friend(s)  Winston Johnson [phone]  Current living Arrangements: house ([address] [place] [id number])      Able to return to prior arrangements: yes  Living Arrangement Comments: home has 2 steps to enter the front; 4 steps to enter the back; is an older home with 15 steps up to bathroom & bedrooms; 11 steps down to kitchen    Family/Social Support:  Care provided by: self  Provides care for: no one  Marital Status: Single  Support system: Friend, Sibling(s)          Description of Support System: Supportive, Involved    Support Assessment: Adequate family and caregiver support    Current Resources:   Patient receiving home care services: No  Community Resources: None  Equipment currently used at home: none  Supplies currently used at home: None    Employment/Financial:  Employment Status:       Financial Concerns: none   Finance Comments: active [place] insurance  Does the patient's insurance plan have a 3 day qualifying hospital stay waiver?  No    Lifestyle & Psychosocial Needs:  Social Determinants of Health     Food Insecurity: Not on file   Depression: Not at risk (3/8/2024)    PHQ-2     PHQ-2 Score: 0   Housing Stability: Not on file   Tobacco Use: Medium Risk (3/8/2024)    Patient History     Smoking  Tobacco Use: Former     Smokeless Tobacco Use: Former     Passive Exposure: Not on file   Financial Resource Strain: Not on file   Alcohol Use: Not on file   Transportation Needs: Not on file   Physical Activity: Not on file   Interpersonal Safety: Low Risk  (3/8/2024)    Interpersonal Safety     Do you feel physically and emotionally safe where you currently live?: Yes     Within the past 12 months, have you been hit, slapped, kicked or otherwise physically hurt by someone?: No     Within the past 12 months, have you been humiliated or emotionally abused in other ways by your partner or ex-partner?: No   Stress: Not on file   Social Connections: Not on file   Health Literacy: Not on file     Functional Status:  Prior to admission patient needed assistance:   Dependent ADLs:: Independent  Dependent IADLs:: Independent     Mental Health Status:  Mental Health Status: No Current Concerns       Chemical Dependency Status:  Chemical Dependency Status: No Current Concerns       Values/Beliefs:  Spiritual, Cultural Beliefs, Advent Practices, Values that affect care: no         Discussed  Partnership in Safe Discharge Planning  document with patient/family: No    Additional Information:  Met with patient in room, introduced self and role in discharge planning. Confirmed the information in the above assessment, please see each section for helpful details.  Pt lives independently in her home in [place], friend Winston also lives there.  He is her emergency contact; she added brother Jabari Jackson to contact list for next of kin.  Confirms no ACP documents but did request blank copy--given.  Identifies no needs.      [facility] team did secure a hospital followup apt, added to AVS:   Sep 10, 2024 11:40 AM (Arrive by 11:20 AM)  ED/Hospital Follow Up with Belia Lema DNP  [facility]     Note: if home O2 needed, this is managed by bedside RN.     Next Steps:   No further care management intervention  anticipated at this time.  Care management signing off.   Please re-consult if further needs arise.    Genna Sharp RN, BSN, PHN  [facility]  Inpatient Care Management - FLOAT  Please reach out via [employer] or contact   Short Stay CM RN Mobile: [phone] daily 7:30-4:00

## 2024-09-03 NOTE — PROGRESS NOTES
[facility]    Medicine Progress Note - Hospitalist Service    Date of Admission:  9/2/2024    Assessment & Plan   Lesli Jackson is a 63 year old female with PMH of  HTN, HLP, DM2, Tobacco use D/O, Obesity, Chronic bilateral lower extremity edema, Alcohol use D/O who was admitted on 9/2/2024 for acute hypoxic respiratory failure secondary to suspected COPD exacerbation.      Acute hypoxic hypercapnic respiratory failure with new O2 requirement since ED presentation  Suspected COPD exacerbation with bicarb of 34 lower extremity edema  *has felt sick for ~ 1 week prior to admission with URI symptoms (sore throat, runny nose, and mostly nonproductive cough).  She has taken a COVID test twice and both were negative.  *Two-view chest x-ray 9/2/24 showed normal heart size and pulmonary vascularity, clear lungs without evidence of pleural effusion but noted mild degenerative changes in the thoracic spine.  *No formal diagnosis of COPD but symptoms consistent with COPD with acute shortness of breath, wheezing, somewhat productive cough and continued tobacco use.    - Prednisone 40 mg/d, plan for 5-day course  - Scheduled DuoNebs 4 times a day and every 4 hours PRN.    - Mucinex 1200 mg BID.    - IS/Flutter valve use encouraged.    -Wean FiO2, SpO2 goal greater than equal to 90% monitor O2 saturations.    -Patient is hopeful to not need O2 at discharge  - RCAT requested, aggressive pulmonary hygiene with incentive promontory and flutter valve  - Outpatient referral to Pulmonology at discharge.  -Ambulate 3 times daily  -Checking RSV COVID, flu--> all are negative so will check D-dimer  -If D-dimer is positive will obtain both lower extremity venous Dopplers and CT angiogram to evaluate if VTE is contributing to her suspected COPD exacerbation    Lower extremity edema chronic, secondary lymphedema worse than usual  *Coupled with acute hypoxic respiratory failure entertained idea of VTE, no personal  "prior history of such  -Checking D-dimer as above  - Will obtain CT angiogram chest and lower extremity venous Dopplers if positive    Tobacco Use D/O   Patient currently smokes ~ 10 cigarettes/day ready to quit effective date of discharge  - Counseled regarding smoking cessation that should also continue with PCP.    - Nicoderm patch offered patient declined    HTN  -Continue PTA lisinopril 60 mg/d, Coreg 6.25 mg BID, hydrochlorothiazide 25 mg/d.  Hold parameters in place.      HLP  -Continue PTA lovastatin 10 mg at bedtime.      DM2 with hyperglycemia  Recent Labs   Lab 09/03/24  0701 09/02/24  1539   * 137*     *A1c from 3/8/2024 elevated at 7.5%.  Patient does not appear to be on any medications.  Reviewed PCP note from 5/2024 in which she elected to proceed with diet and life style modifications.    - BMP ordered for 9/3.    -Anticipate that alcohol cessation will help with glycemic control  -change to mod CHO diet  -recheck A1c    Obesity   BMI ~ 37.61.    Increase in all-cause morbidity and mortality.   - Follow up with PCP regarding ongoing management.      Alcohol use D/O  *Patient reportedly consumes 3-4 drinks per day.    -Discussed endorgan damage with ongoing use she endorses readiness to quit          Diet: Combination Diet Vegetarian Diet    DVT Prophylaxis: Enoxaparin (Lovenox) SQ  Pryor Catheter: Not present  Lines: None     Cardiac Monitoring: None  Code Status: Full Code      Clinically Significant Risk Factors Present on Admission                  # Hypertension: Noted on problem list        # DMII: A1C = N/A within past 6 months    # Obesity: Estimated body mass index is 35.72 kg/m  as calculated from the following:    Height as of this encounter: 1.803 m (5' 11\").    Weight as of this encounter: 116.2 kg (256 lb 1.6 oz).       # Financial/Environmental Concerns: none               Disposition Plan     Medically Ready for Discharge: Anticipated Tomorrow 9/4/24 pending ability to come off " of oxygen.  She could be considered for short-term home O2 at discharge       The patient's care was discussed with the Attending Physician, Dr. Lawrence, Bedside Nurse, and Patient.    ALICE Kovacs [facility]  Hospitalist Service  [facility]  Securely message with [employer] (more info)  Text page via [facility] Paging/Directory   ______________________________________________________________________    Interval History   Reports breathing is better though not yet at baseline.  It is less labored than prior.  She is not on any oxygen PTA.  Still feels as though she has wheezing, endorses chronic LLE> RLE lower extremity edema but feels it is generally worse today than it has been.  She is not craving a nicotine generally feels she is ready to quit smoking.  Drinks alcohol daily but does not think she actually needs it is willing to quit.  Endorses some degree of lightheadedness that subsided without intervention    Physical Exam   Vital Signs: Temp: 98  F (36.7  C) Temp src: Oral BP: (!) 152/88 Pulse: 70   Resp: 16 SpO2: 90 % O2 Device: Nasal cannula Oxygen Delivery: 1 LPM  Weight: 256 lbs 1.6 oz    Constitutional: vs as per EMR  General:  adult pt lying in bed without acute distress  Neuro: +follows commands wiggle toes and show 2 fingers bilat, face symmetric, tongue midline, speech fluent, able to stand without assistance  Head, ENT & mouth: NC/AT,  mouth moist oral mucosa  Neck: supple  CV S1S2 no murmur  resp: Diffuse in all lung fields expiratory wheezes but speaking in full sentences no accessory muscle use  gi:normoactive bowel sounds, soft, nontender, nondisteded  Ext:  LLE> RLE lower extremity edema  Skin: no rashes on exposed skin  Musculoskeletal no bony joint deformities    Medical Decision Making       35 MINUTES SPENT BY ME on the date of service doing chart review, history, exam, documentation & further activities per the note.  Moderate complexity, 1 acute problem uncertain  significance, reviewed/ordered greater than equal to 3 lab data points, prescription medication management    Data     I have personally reviewed the following data over the past 24 hrs:    8.9  \   14.4   / 166     137 95 (L) 9.8 /  143 (H)   3.5 34 (H) 0.58 \     ALT: 20 AST: 38 AP: 98 TBILI: 0.6   ALB: 3.9 TOT PROTEIN: 7.8 LIPASE: N/A     Trop: N/A BNP: 289       Imaging results reviewed over the past 24 hrs:   Recent Results (from the past 24 hour(s))   XR Chest 2 Views    Narrative    EXAM: XR CHEST 2 VIEWS  LOCATION: [facility]  DATE: 9/2/2024    INDICATION: Shortness of breath. Wheezing.  COMPARISON: 6/1/2008      Impression    IMPRESSION: Normal heart size and pulmonary vascularity. Lungs are clear. No pleural effusions. Mild degenerative changes in the thoracic spine.

## 2024-09-04 PROCEDURE — 250N000012 HC RX MED GY IP 250 OP 636 PS 637: Performed by: PHYSICIAN ASSISTANT

## 2024-09-04 PROCEDURE — 250N000013 HC RX MED GY IP 250 OP 250 PS 637: Performed by: PHYSICIAN ASSISTANT

## 2024-09-04 PROCEDURE — 120N000001 HC R&B MED SURG/OB

## 2024-09-04 PROCEDURE — 250N000011 HC RX IP 250 OP 636: Performed by: PHYSICIAN ASSISTANT

## 2024-09-04 PROCEDURE — 94640 AIRWAY INHALATION TREATMENT: CPT

## 2024-09-04 PROCEDURE — 99232 SBSQ HOSP IP/OBS MODERATE 35: CPT | Performed by: STUDENT IN AN ORGANIZED HEALTH CARE EDUCATION/TRAINING PROGRAM

## 2024-09-04 PROCEDURE — 999N000157 HC STATISTIC RCP TIME EA 10 MIN

## 2024-09-04 PROCEDURE — 250N000009 HC RX 250: Performed by: PHYSICIAN ASSISTANT

## 2024-09-04 PROCEDURE — 94640 AIRWAY INHALATION TREATMENT: CPT | Mod: 76

## 2024-09-04 RX ADMIN — SENNOSIDES AND DOCUSATE SODIUM 1 TABLET: 50; 8.6 TABLET ORAL at 17:15

## 2024-09-04 RX ADMIN — GUAIFENESIN 1200 MG: 600 TABLET, EXTENDED RELEASE ORAL at 08:28

## 2024-09-04 RX ADMIN — IPRATROPIUM BROMIDE AND ALBUTEROL SULFATE 3 ML: .5; 3 SOLUTION RESPIRATORY (INHALATION) at 19:55

## 2024-09-04 RX ADMIN — GUAIFENESIN 1200 MG: 600 TABLET, EXTENDED RELEASE ORAL at 20:39

## 2024-09-04 RX ADMIN — CARVEDILOL 6.25 MG: 6.25 TABLET, FILM COATED ORAL at 08:28

## 2024-09-04 RX ADMIN — LISINOPRIL 60 MG: 40 TABLET ORAL at 20:39

## 2024-09-04 RX ADMIN — PREDNISONE 40 MG: 20 TABLET ORAL at 08:28

## 2024-09-04 RX ADMIN — IPRATROPIUM BROMIDE AND ALBUTEROL SULFATE 3 ML: .5; 3 SOLUTION RESPIRATORY (INHALATION) at 15:05

## 2024-09-04 RX ADMIN — IPRATROPIUM BROMIDE AND ALBUTEROL SULFATE 3 ML: .5; 3 SOLUTION RESPIRATORY (INHALATION) at 07:38

## 2024-09-04 RX ADMIN — IPRATROPIUM BROMIDE AND ALBUTEROL SULFATE 3 ML: .5; 3 SOLUTION RESPIRATORY (INHALATION) at 11:21

## 2024-09-04 RX ADMIN — CARVEDILOL 6.25 MG: 6.25 TABLET, FILM COATED ORAL at 17:13

## 2024-09-04 RX ADMIN — IPRATROPIUM BROMIDE AND ALBUTEROL SULFATE 3 ML: .5; 3 SOLUTION RESPIRATORY (INHALATION) at 05:22

## 2024-09-04 RX ADMIN — HYDROCHLOROTHIAZIDE 25 MG: 25 TABLET ORAL at 08:28

## 2024-09-04 RX ADMIN — PRAVASTATIN SODIUM 20 MG: 20 TABLET ORAL at 20:39

## 2024-09-04 RX ADMIN — ENOXAPARIN SODIUM 40 MG: 40 INJECTION SUBCUTANEOUS at 08:27

## 2024-09-04 ASSESSMENT — ACTIVITIES OF DAILY LIVING (ADL)
ADLS_ACUITY_SCORE: 18

## 2024-09-04 NOTE — PLAN OF CARE
Mental Status: A&O x4  Activity/dangle: Independent in room  Diet: Vegetarian/Mod Carb  Pain: Denies pain  Pryor/Voiding: Voiding in the bathroom  Tele/Restraints/Iso: N/A  02/LDA: 1L 02 NC. IV saline locked  D/C Date: TBD  Other Info: [facility] protocol. Given 1x Senna per pt request.

## 2024-09-04 NOTE — PROGRESS NOTES
PRIMARY Concern: Acute hypoxic respiratory failure. Suspected COPD exacerbation  SAFETY RISK Concerns (fall risk, behaviors, etc.): None  Isolation/Type: none  Tests/Procedures for NEXT shift: AM Labs   Consults? (Pending/following, signed-off?) RT for Duonebs   Where is patient from? (Home, TCU, etc.): Home  Other Important info for NEXT shift: Unable to wean O2  Anticipated DC date & active delays: TBD, pending improvement   _____________________________________________________________________________  SUMMARY NOTE:   Orientation/Cognitive: A/O x4  Observation Goals (Met/ Not Met): In-pt  Mobility Level/Assist Equipment: Ind  Antibiotics & Plan (IV/po, length of tx left): n/a  Pain Management: Denies pain   Tele/VS/O2:  VSS on 2L NC  ABNL Lab/BG: A1C 7.1, D-dimer 2.81  Diet: Vegetarian/Mod carb   Bowel/Bladder: Continent  Skin Concerns: BLE and Yosi wrist edima (baseline wrist edema per pt)    Drains/Devices: PIV SL  Patient Stated Goal for Today: Rest

## 2024-09-04 NOTE — PROGRESS NOTES
PRIMARY Concern: Acute hypoxic respiratory failure. Suspected COPD exacerbation  SAFETY RISK Concerns (fall risk, behaviors, etc.): None  Isolation/Type: n/a  Tests/Procedures for NEXT shift: Labs  Consults? (Pending/following, signed-off?)   Where is patient from? (Home, TCU, etc.): Home  Other Important info for NEXT shift: Daily alcohol intake. CIWA 0. BLE US- no DVT. Chest CT- no PE.  Anticipated DC date & active delays: TBD  _____________________________________________________________________________  SUMMARY NOTE:  Orientation/Cognitive: A&Ox4  Observation Goals (Met/ Not Met): Inpatient   Mobility Level/Assist Equipment: Independent  Antibiotics & Plan (IV/po, length of tx left): Prednisone   Pain Management: Denies  Tele/VS/O2:  VSS on 2.5 L NC  ABNL Lab/BG: CO2: 36, covid/flu swab neg  Diet: Vegetarian/CHO  Bowel/Bladder: Continent  Skin Concerns: BLE edema, +3 edema in left leg, +2 in right leg. +2 Edema in R/L wrists (baseline).   Drains/Devices: PIV SL.  Patient Stated Goal for Today: Hopeful to discharge tomorrow 9/4

## 2024-09-04 NOTE — PROGRESS NOTES
[facility]    Medicine Progress Note - Hospitalist Service    Date of Admission:  9/2/2024    Assessment & Plan      Lesli Jackson is a 63 year old female with PMH of  HTN, HLP, DM2, Tobacco use D/O, Obesity, Chronic bilateral lower extremity edema, Alcohol use D/O who was admitted on 9/2/2024 for acute hypoxic respiratory failure secondary to suspected COPD exacerbation.       Acute hypoxic hypercapnic respiratory failure with new O2 requirement since ED presentation  Suspected COPD exacerbation with bicarb of 34 lower extremity edema  *has felt sick for ~ 1 week prior to admission with URI symptoms (sore throat, runny nose, and mostly nonproductive cough).  She has taken a COVID test twice and both were negative.  *Two-view chest x-ray 9/2/24 showed normal heart size and pulmonary vascularity, clear lungs without evidence of pleural effusion but noted mild degenerative changes in the thoracic spine.  *No formal diagnosis of COPD but symptoms consistent with COPD with acute shortness of breath, wheezing, somewhat productive cough and continued tobacco use.    - Prednisone 40 mg/d, plan for 5-day course  - Scheduled DuoNebs 4 times a day and every 4 hours PRN.    - Mucinex 1200 mg BID.    - IS/Flutter valve use encouraged.    -Wean FiO2, SpO2 goal greater than equal to 90% monitor O2 saturations.    -Patient is hopeful to not need O2 at discharge  - RCAT requested, aggressive pulmonary hygiene with incentive promontory and flutter valve  - Outpatient referral to Pulmonology at discharge.  -Ambulate 3 times daily  -Checking RSV COVID, flu--> all are negative   -Duplex negative and CTA shows centrilobular empyhsema and no PE           Lower extremity edema chronic, secondary lymphedema worse than usual  *Coupled with acute hypoxic respiratory failure entertained idea of VTE, no personal prior history of such  -Duplex no DVT      Tobacco Use D/O   Patient currently smokes ~ 10 cigarettes/day  "ready to quit effective date of discharge  - Counseled regarding smoking cessation that should also continue with PCP.    - Nicoderm patch offered patient declined     HTN  -Continue PTA lisinopril 60 mg/d, Coreg 6.25 mg BID, hydrochlorothiazide 25 mg/d.  Hold parameters in place.       HLP  -Continue PTA lovastatin 10 mg at bedtime.       DM2 with hyperglycemia     *A1c from 3/8/2024 elevated at 7.5%.  Patient does not appear to be on any medications.  Reviewed PCP note from 5/2024 in which she elected to proceed with diet and life style modifications.    - BMP ordered for 9/3.    -Anticipate that alcohol cessation will help with glycemic control  -change to mod CHO diet  -recheck A1c     Obesity   BMI ~ 37.61.    Increase in all-cause morbidity and mortality.   - Follow up with PCP regarding ongoing management.       Alcohol use D/O  *Patient reportedly consumes 3-4 drinks per day.    -Was counselled by MARIOLA Kristina thompson with ongoing use she endorses readiness to quit           Diet: Combination Diet Vegetarian Diet; Moderate Consistent Carb (60 g CHO per Meal) Diet    DVT Prophylaxis: Enoxaparin (Lovenox) SQ  Pryor Catheter: Not present  Lines: None     Cardiac Monitoring: None  Code Status: Full Code      Clinically Significant Risk Factors                  # Hypertension: Noted on problem list          # DMII: A1C = 7.1 % (Ref range: <5.7 %) within past 6 months, PRESENT ON ADMISSION  # Obesity: Estimated body mass index is 35.64 kg/m  as calculated from the following:    Height as of this encounter: 1.803 m (5' 11\").    Weight as of this encounter: 115.9 kg (255 lb 8.2 oz)., PRESENT ON ADMISSION     # Financial/Environmental Concerns: none               Disposition Plan     Medically Ready for Discharge: Anticipated Tomorrow             Mahsa Lawrence MD  Hospitalist Service  [facility]  Securely message with [employer] (more info)  Text page via [employer] " [employer]/Directory   ______________________________________________________________________    Interval History   Patient seen and examined at bedside feels well.  Improved shortness of breath   Physical Exam   Vital Signs: Temp: 98.3  F (36.8  C) Temp src: Oral BP: (!) 145/85 Pulse: 68   Resp: 18 SpO2: 90 % O2 Device: Nasal cannula Oxygen Delivery: 1 LPM  Weight: 255 lbs 8.21 oz    Physical Exam  Cardiovascular:      Rate and Rhythm: Normal rate and regular rhythm.   Pulmonary:      Effort: Pulmonary effort is normal. No respiratory distress.      Breath sounds: Wheezing present.   Abdominal:      General: There is no distension.      Palpations: Abdomen is soft.      Tenderness: There is no abdominal tenderness.   Neurological:      Mental Status: She is alert.          Medical Decision Making       40 MINUTES SPENT BY ME on the date of service doing chart review, history, exam, documentation & further activities per the note.      Data     I have personally reviewed the following data over the past 24 hrs:    TSH: N/A T4: N/A A1C: N/A       Imaging results reviewed over the past 24 hrs:   Recent Results (from the past 24 hour(s))   US Lower Extremity Venous Duplex Bilateral    Narrative    VENOUS ULTRASOUND BILATERAL LEG(S)  9/3/2024 3:34 PM     HISTORY: Asymmetric lower extremity edema. Left greater than right  lower extremity swelling.    COMPARISON: None.    FINDINGS: Examination of the deep veins with graded compression and  color flow Doppler with spectral wave form analysis was performed.  Images show no evidence of thrombus in the bilateral common femoral  vein, femoral vein, popliteal vein or calf veins. Complex fluid  collection in the left popliteal fossa measuring 6.9 x 3.9 x 0.9 cm,  with no internal color flow. This likely represents a popliteal fossa  cyst.      Impression    IMPRESSION:   1. No deep vein thrombosis in either lower extremity.   2. Complex fluid collection left popliteal fossa, likely  popliteal  fossa cyst.     YADIRA DO TASNEEM         SYSTEM ID:  [id number]   CT Chest Pulmonary Embolism w Contrast    Narrative    EXAM: CT CHEST PULMONARY EMBOLISM W CONTRAST  LOCATION: [facility]  DATE: 9/3/2024    INDICATION: Acute hypoxic respiratory failure.   COMPARISON: None.  TECHNIQUE: CT chest pulmonary angiogram during arterial phase injection of IV contrast. Multiplanar reformats and MIP reconstructions were performed. Dose reduction techniques were used.   CONTRAST: 80mL isovue 370    FINDINGS:    ANGIOGRAM CHEST: Pulmonary arteries are normal caliber and negative for pulmonary emboli. Thoracic aorta is normal in caliber and suboptimally opacified; no evidence of acute aortic pathology. Mild atherosclerotic calcifications within the aortic arch   and descending thoracic aorta.    LUNGS AND PLEURA: Mild upper lobe predominant centrilobular emphysema. Mild central bronchial wall thickening. No consolidations, pleural effusions, or pneumothorax. Punctate right lower lobe calcified granuloma. Few scattered small thin-walled pulmonary   cysts.     MEDIASTINUM/AXILLAE: Normal cardiac size. Mitral annular calcifications. No pericardial effusion. No enlarged thoracic lymph node.     CORONARY ARTERY CALCIFICATION: Moderate.    UPPER ABDOMEN: Simple right upper pole renal cyst, which does not require follow-up.     MUSCULOSKELETAL: Multilevel degenerative changes of the spine. No acute bony abnormalities.       Impression    IMPRESSION:    1.  No pulmonary embolism.    2.  Mild inflammatory central bronchial wall thickening. No consolidations or pleural effusions.    3.  Mild upper lobe predominant centrilobular emphysema.    4.  Moderate coronary arterial calcifications.

## 2024-09-05 ENCOUNTER — PATIENT OUTREACH (OUTPATIENT)
Dept: CARE COORDINATION | Facility: CLINIC | Age: 64
End: 2024-09-05
Payer: COMMERCIAL

## 2024-09-05 LAB
ANION GAP SERPL CALCULATED.3IONS-SCNC: 9 MMOL/L (ref 7–15)
BUN SERPL-MCNC: 14 MG/DL (ref 8–23)
CALCIUM SERPL-MCNC: 9.3 MG/DL (ref 8.8–10.4)
CHLORIDE SERPL-SCNC: 96 MMOL/L (ref 98–107)
CREAT SERPL-MCNC: 0.63 MG/DL (ref 0.51–0.95)
EGFRCR SERPLBLD CKD-EPI 2021: >90 ML/MIN/1.73M2
ERYTHROCYTE [DISTWIDTH] IN BLOOD BY AUTOMATED COUNT: 13.4 % (ref 10–15)
GLUCOSE SERPL-MCNC: 128 MG/DL (ref 70–99)
HCO3 SERPL-SCNC: 34 MMOL/L (ref 22–29)
HCT VFR BLD AUTO: 40.2 % (ref 35–47)
HGB BLD-MCNC: 13.1 G/DL (ref 11.7–15.7)
MCH RBC QN AUTO: 30.4 PG (ref 26.5–33)
MCHC RBC AUTO-ENTMCNC: 32.6 G/DL (ref 31.5–36.5)
MCV RBC AUTO: 93 FL (ref 78–100)
PLATELET # BLD AUTO: 164 10E3/UL (ref 150–450)
POTASSIUM SERPL-SCNC: 3.3 MMOL/L (ref 3.4–5.3)
POTASSIUM SERPL-SCNC: 4.2 MMOL/L (ref 3.4–5.3)
RBC # BLD AUTO: 4.31 10E6/UL (ref 3.8–5.2)
SODIUM SERPL-SCNC: 139 MMOL/L (ref 135–145)
WBC # BLD AUTO: 8.9 10E3/UL (ref 4–11)

## 2024-09-05 PROCEDURE — 120N000001 HC R&B MED SURG/OB

## 2024-09-05 PROCEDURE — 80048 BASIC METABOLIC PNL TOTAL CA: CPT | Performed by: STUDENT IN AN ORGANIZED HEALTH CARE EDUCATION/TRAINING PROGRAM

## 2024-09-05 PROCEDURE — 250N000013 HC RX MED GY IP 250 OP 250 PS 637: Performed by: STUDENT IN AN ORGANIZED HEALTH CARE EDUCATION/TRAINING PROGRAM

## 2024-09-05 PROCEDURE — 85027 COMPLETE CBC AUTOMATED: CPT | Performed by: STUDENT IN AN ORGANIZED HEALTH CARE EDUCATION/TRAINING PROGRAM

## 2024-09-05 PROCEDURE — 99232 SBSQ HOSP IP/OBS MODERATE 35: CPT | Performed by: STUDENT IN AN ORGANIZED HEALTH CARE EDUCATION/TRAINING PROGRAM

## 2024-09-05 PROCEDURE — 36415 COLL VENOUS BLD VENIPUNCTURE: CPT | Performed by: STUDENT IN AN ORGANIZED HEALTH CARE EDUCATION/TRAINING PROGRAM

## 2024-09-05 PROCEDURE — 94640 AIRWAY INHALATION TREATMENT: CPT | Mod: 76

## 2024-09-05 PROCEDURE — 250N000012 HC RX MED GY IP 250 OP 636 PS 637: Performed by: PHYSICIAN ASSISTANT

## 2024-09-05 PROCEDURE — 94640 AIRWAY INHALATION TREATMENT: CPT

## 2024-09-05 PROCEDURE — 250N000011 HC RX IP 250 OP 636: Performed by: PHYSICIAN ASSISTANT

## 2024-09-05 PROCEDURE — 999N000157 HC STATISTIC RCP TIME EA 10 MIN

## 2024-09-05 PROCEDURE — 250N000011 HC RX IP 250 OP 636: Performed by: STUDENT IN AN ORGANIZED HEALTH CARE EDUCATION/TRAINING PROGRAM

## 2024-09-05 PROCEDURE — 84132 ASSAY OF SERUM POTASSIUM: CPT | Performed by: STUDENT IN AN ORGANIZED HEALTH CARE EDUCATION/TRAINING PROGRAM

## 2024-09-05 PROCEDURE — 250N000013 HC RX MED GY IP 250 OP 250 PS 637: Performed by: PHYSICIAN ASSISTANT

## 2024-09-05 PROCEDURE — 250N000009 HC RX 250: Performed by: PHYSICIAN ASSISTANT

## 2024-09-05 RX ORDER — POTASSIUM CHLORIDE 1500 MG/1
40 TABLET, EXTENDED RELEASE ORAL ONCE
Status: COMPLETED | OUTPATIENT
Start: 2024-09-05 | End: 2024-09-05

## 2024-09-05 RX ORDER — METHYLPREDNISOLONE SODIUM SUCCINATE 125 MG/2ML
60 INJECTION, POWDER, LYOPHILIZED, FOR SOLUTION INTRAMUSCULAR; INTRAVENOUS EVERY 12 HOURS
Status: DISCONTINUED | OUTPATIENT
Start: 2024-09-05 | End: 2024-09-06 | Stop reason: HOSPADM

## 2024-09-05 RX ADMIN — IPRATROPIUM BROMIDE AND ALBUTEROL SULFATE 3 ML: .5; 3 SOLUTION RESPIRATORY (INHALATION) at 11:50

## 2024-09-05 RX ADMIN — POTASSIUM CHLORIDE 40 MEQ: 1500 TABLET, EXTENDED RELEASE ORAL at 10:56

## 2024-09-05 RX ADMIN — GUAIFENESIN 1200 MG: 600 TABLET, EXTENDED RELEASE ORAL at 09:48

## 2024-09-05 RX ADMIN — IPRATROPIUM BROMIDE AND ALBUTEROL SULFATE 3 ML: .5; 3 SOLUTION RESPIRATORY (INHALATION) at 07:48

## 2024-09-05 RX ADMIN — CARVEDILOL 6.25 MG: 6.25 TABLET, FILM COATED ORAL at 19:45

## 2024-09-05 RX ADMIN — HYDROCHLOROTHIAZIDE 25 MG: 25 TABLET ORAL at 09:48

## 2024-09-05 RX ADMIN — CARVEDILOL 6.25 MG: 6.25 TABLET, FILM COATED ORAL at 09:48

## 2024-09-05 RX ADMIN — ENOXAPARIN SODIUM 40 MG: 40 INJECTION SUBCUTANEOUS at 09:48

## 2024-09-05 RX ADMIN — PRAVASTATIN SODIUM 20 MG: 20 TABLET ORAL at 20:40

## 2024-09-05 RX ADMIN — LISINOPRIL 60 MG: 40 TABLET ORAL at 20:40

## 2024-09-05 RX ADMIN — METHYLPREDNISOLONE SODIUM SUCCINATE 62.5 MG: 125 INJECTION, POWDER, FOR SOLUTION INTRAMUSCULAR; INTRAVENOUS at 14:40

## 2024-09-05 RX ADMIN — PREDNISONE 40 MG: 20 TABLET ORAL at 09:48

## 2024-09-05 RX ADMIN — GUAIFENESIN 1200 MG: 600 TABLET, EXTENDED RELEASE ORAL at 20:39

## 2024-09-05 RX ADMIN — IPRATROPIUM BROMIDE AND ALBUTEROL SULFATE 3 ML: .5; 3 SOLUTION RESPIRATORY (INHALATION) at 16:37

## 2024-09-05 ASSESSMENT — ACTIVITIES OF DAILY LIVING (ADL)
ADLS_ACUITY_SCORE: 18
ADLS_ACUITY_SCORE: 19
ADLS_ACUITY_SCORE: 19
ADLS_ACUITY_SCORE: 18
ADLS_ACUITY_SCORE: 19
ADLS_ACUITY_SCORE: 19
ADLS_ACUITY_SCORE: 18
ADLS_ACUITY_SCORE: 19
ADLS_ACUITY_SCORE: 18
ADLS_ACUITY_SCORE: 19
ADLS_ACUITY_SCORE: 18

## 2024-09-05 NOTE — PROGRESS NOTES
PRIMARY Concern: Acute hypoxic respiratory failure. Suspected COPD exacerbation  SAFETY RISK Concerns (fall risk, behaviors, etc.): None  Isolation/Type: n/a  Tests/Procedures for NEXT shift: K recheck, Echo pending  Consults? (Pending/following, signed-off?) RT following for scheduled nebs  Where is patient from? (Home, TCU, etc.): Home  Other Important info for NEXT shift: Daily alcohol intake. CIWA 0, re-eval home O2 assessments tomorrow  Anticipated DC date & active delays: tomorrow pending O2 needs  _____________________________________________________________________________  SUMMARY NOTE:  Orientation/Cognitive: A&Ox4  Observation Goals (Met/ Not Met): Inpatient   Mobility Level/Assist Equipment: Independent  Antibiotics & Plan (IV/po, length of tx left): escalated to IV solumedrol this shift  Pain Management: Denies  Tele/VS/O2:  VSS on 2 L, needing 6L with activity  ABNL Lab/BG: K+ 3.3, replaced, recheck pending  Diet: Vegetarian/CHO  Bowel/Bladder: Continent  Skin Concerns: BLE edema, +3 edema in left leg, +2 in right leg. +2 Edema in R/L wrists (baseline).   Drains/Devices: PIV SL.  Patient Stated Goal for Today: Hopeful to discharge tomorrow 9/6

## 2024-09-05 NOTE — PLAN OF CARE
PRIMARY Concern: Acute hypoxic respiratory failure. Suspected COPD exacerbation  SAFETY RISK Concerns (fall risk, behaviors, etc.): None  Isolation/Type: None  Tests/Procedures for NEXT shift: AM Labs, wean off O2 needs,   Consults? (Pending/following, signed-off?) RT following for nebs,   Where is patient from? (Home, TCU, etc.): Home  Other Important info for NEXT shift: CiWAr checks, been 0  Anticipated DC date & active delays: TBD  ___________________________________________  SUMMARY NOTE:   Orientation/Cognitive: A&O x4  Observation Goals (Met/ Not Met): Inpatient  Mobility Level/Assist Equipment: Ind  Antibiotics & Plan (IV/po, length of tx left): n/a  Pain Management: Denies pain   Tele/VS/O2:  VSS on 2L NC  ABNL Lab/BG: A1C 7.1, D-dimer 2.81, Chest XR neg, CT Chest neg for PE  Diet: Vegetarian/Mod CHO  Bowel/Bladder: Continent  Skin Concerns: BLE and Yosi wrist edima (baseline wrist edema per pt)  +2  Drains/Devices: PIV SL  Patient Stated Goal for Today: Rest

## 2024-09-05 NOTE — PROGRESS NOTES
Patient has been assessed for Home Oxygen needs.     Pulse oximetry (SpO2) and Oxygen flow readings:    SpO2 = 88% on room air at rest while awake.    SpO2 improved to 92% on 2 liters/minute at rest.    SpO2 = 84% on room air during activity/with exercise.    *SpO2 improved to 94% on 6 liters/minute during activity/with exercise.

## 2024-09-06 ENCOUNTER — APPOINTMENT (OUTPATIENT)
Dept: CARDIOLOGY | Facility: CLINIC | Age: 64
End: 2024-09-06
Attending: STUDENT IN AN ORGANIZED HEALTH CARE EDUCATION/TRAINING PROGRAM
Payer: COMMERCIAL

## 2024-09-06 VITALS
TEMPERATURE: 97.9 F | OXYGEN SATURATION: 92 % | RESPIRATION RATE: 18 BRPM | WEIGHT: 255.51 LBS | DIASTOLIC BLOOD PRESSURE: 88 MMHG | HEART RATE: 52 BPM | BODY MASS INDEX: 35.77 KG/M2 | SYSTOLIC BLOOD PRESSURE: 160 MMHG | HEIGHT: 71 IN

## 2024-09-06 LAB
ANION GAP SERPL CALCULATED.3IONS-SCNC: 10 MMOL/L (ref 7–15)
BUN SERPL-MCNC: 14 MG/DL (ref 8–23)
CALCIUM SERPL-MCNC: 9.7 MG/DL (ref 8.8–10.4)
CHLORIDE SERPL-SCNC: 94 MMOL/L (ref 98–107)
CREAT SERPL-MCNC: 0.58 MG/DL (ref 0.51–0.95)
EGFRCR SERPLBLD CKD-EPI 2021: >90 ML/MIN/1.73M2
GLUCOSE SERPL-MCNC: 225 MG/DL (ref 70–99)
HCO3 SERPL-SCNC: 33 MMOL/L (ref 22–29)
LVEF ECHO: NORMAL
POTASSIUM SERPL-SCNC: 4.6 MMOL/L (ref 3.4–5.3)
SODIUM SERPL-SCNC: 137 MMOL/L (ref 135–145)

## 2024-09-06 PROCEDURE — 250N000009 HC RX 250: Performed by: PHYSICIAN ASSISTANT

## 2024-09-06 PROCEDURE — 94640 AIRWAY INHALATION TREATMENT: CPT

## 2024-09-06 PROCEDURE — 36415 COLL VENOUS BLD VENIPUNCTURE: CPT | Performed by: STUDENT IN AN ORGANIZED HEALTH CARE EDUCATION/TRAINING PROGRAM

## 2024-09-06 PROCEDURE — C8929 TTE W OR WO FOL WCON,DOPPLER: HCPCS

## 2024-09-06 PROCEDURE — 255N000002 HC RX 255 OP 636: Performed by: STUDENT IN AN ORGANIZED HEALTH CARE EDUCATION/TRAINING PROGRAM

## 2024-09-06 PROCEDURE — 82565 ASSAY OF CREATININE: CPT | Performed by: STUDENT IN AN ORGANIZED HEALTH CARE EDUCATION/TRAINING PROGRAM

## 2024-09-06 PROCEDURE — 250N000013 HC RX MED GY IP 250 OP 250 PS 637: Performed by: PHYSICIAN ASSISTANT

## 2024-09-06 PROCEDURE — 99239 HOSP IP/OBS DSCHRG MGMT >30: CPT | Performed by: STUDENT IN AN ORGANIZED HEALTH CARE EDUCATION/TRAINING PROGRAM

## 2024-09-06 PROCEDURE — 250N000011 HC RX IP 250 OP 636: Performed by: PHYSICIAN ASSISTANT

## 2024-09-06 PROCEDURE — 250N000011 HC RX IP 250 OP 636: Performed by: STUDENT IN AN ORGANIZED HEALTH CARE EDUCATION/TRAINING PROGRAM

## 2024-09-06 PROCEDURE — 93306 TTE W/DOPPLER COMPLETE: CPT | Mod: 26 | Performed by: INTERNAL MEDICINE

## 2024-09-06 PROCEDURE — 82374 ASSAY BLOOD CARBON DIOXIDE: CPT | Performed by: STUDENT IN AN ORGANIZED HEALTH CARE EDUCATION/TRAINING PROGRAM

## 2024-09-06 PROCEDURE — 94640 AIRWAY INHALATION TREATMENT: CPT | Mod: 76

## 2024-09-06 PROCEDURE — 999N000157 HC STATISTIC RCP TIME EA 10 MIN

## 2024-09-06 PROCEDURE — 999N000208 ECHOCARDIOGRAM COMPLETE

## 2024-09-06 RX ORDER — ALBUTEROL SULFATE 90 UG/1
2 AEROSOL, METERED RESPIRATORY (INHALATION) EVERY 6 HOURS PRN
Qty: 18 G | Refills: 0 | Status: SHIPPED | OUTPATIENT
Start: 2024-09-06

## 2024-09-06 RX ORDER — LANCETS
EACH MISCELLANEOUS
Qty: 100 EACH | Refills: 0 | Status: SHIPPED | OUTPATIENT
Start: 2024-09-06

## 2024-09-06 RX ORDER — PREDNISONE 10 MG/1
TABLET ORAL
Qty: 26 TABLET | Refills: 0 | Status: SHIPPED | OUTPATIENT
Start: 2024-09-06 | End: 2024-09-15

## 2024-09-06 RX ADMIN — IPRATROPIUM BROMIDE AND ALBUTEROL SULFATE 3 ML: .5; 3 SOLUTION RESPIRATORY (INHALATION) at 07:12

## 2024-09-06 RX ADMIN — CARVEDILOL 6.25 MG: 6.25 TABLET, FILM COATED ORAL at 08:51

## 2024-09-06 RX ADMIN — METHYLPREDNISOLONE SODIUM SUCCINATE 62.5 MG: 125 INJECTION, POWDER, FOR SOLUTION INTRAMUSCULAR; INTRAVENOUS at 02:30

## 2024-09-06 RX ADMIN — GUAIFENESIN 1200 MG: 600 TABLET, EXTENDED RELEASE ORAL at 08:52

## 2024-09-06 RX ADMIN — METHYLPREDNISOLONE SODIUM SUCCINATE 62.5 MG: 125 INJECTION, POWDER, FOR SOLUTION INTRAMUSCULAR; INTRAVENOUS at 14:12

## 2024-09-06 RX ADMIN — ENOXAPARIN SODIUM 40 MG: 40 INJECTION SUBCUTANEOUS at 08:52

## 2024-09-06 RX ADMIN — HUMAN ALBUMIN MICROSPHERES AND PERFLUTREN 3 ML: 10; .22 INJECTION, SOLUTION INTRAVENOUS at 10:26

## 2024-09-06 RX ADMIN — SENNOSIDES AND DOCUSATE SODIUM 2 TABLET: 50; 8.6 TABLET ORAL at 14:11

## 2024-09-06 RX ADMIN — IPRATROPIUM BROMIDE AND ALBUTEROL SULFATE 3 ML: .5; 3 SOLUTION RESPIRATORY (INHALATION) at 10:51

## 2024-09-06 RX ADMIN — HYDROCHLOROTHIAZIDE 25 MG: 25 TABLET ORAL at 08:51

## 2024-09-06 RX ADMIN — IPRATROPIUM BROMIDE AND ALBUTEROL SULFATE 3 ML: .5; 3 SOLUTION RESPIRATORY (INHALATION) at 15:20

## 2024-09-06 ASSESSMENT — ACTIVITIES OF DAILY LIVING (ADL)
ADLS_ACUITY_SCORE: 18

## 2024-09-06 NOTE — PROGRESS NOTES
A&OX4, VSS on 2L NC, denies pain, n&V. All diabetic education including the use of glucometer done with pt teach back. All belongings returned to pt. Stable at time of discharge. Discharged with home O2 supplies.

## 2024-09-06 NOTE — PLAN OF CARE
Goal Outcome Evaluation:  Overall Patient Progress: no change      PRIMARY Concern: Acute hypoxic respiratory failure. Suspected COPD exacerbation  SAFETY RISK Concerns (fall risk, behaviors, etc.): None  Isolation/Type: None  Tests/Procedures for NEXT shift: AM Labs, echo  Consults? (Pending/following, signed-off?) RT following for nebs  Where is patient from? (Home, TCU, etc.): Home  Other Important info for NEXT shift: CiWAr checks, been 0, on K+ protocol, Home O2 assessment completed; needs 6L O2 w/ exertion  Anticipated DC date & active delays: TBD  ___________________________________________  SUMMARY NOTE:   Orientation/Cognitive: A&O x4  Observation Goals (Met/ Not Met): Inpatient  Mobility Level/Assist Equipment: Ind  Antibiotics & Plan (IV/po, length of tx left): n/a  Pain Management: Denies pain   Tele/VS/O2:  VSS on 2L NC  ABNL Lab/BG:K+ 4.2  Diet: Vegetarian/Mod CHO  Bowel/Bladder: Continent  Skin Concerns: BLE and Yosi wrist edima (baseline wrist edema per pt)  +2  Drains/Devices: PIV SL  Patient Stated Goal for Today: Rest

## 2024-09-06 NOTE — PLAN OF CARE
Goal Outcome Evaluation:         Overall Patient Progress: no change        PRIMARY Concern: Acute hypoxic respiratory failure. Suspected COPD exacerbation  SAFETY RISK Concerns (fall risk, behaviors, etc.): None  Isolation/Type: None  Tests/Procedures for NEXT shift: AM Labs, echo today.   Consults? (Pending/following, signed-off?) RT following for nebs.  Where is patient from? (Home, TCU, etc.): Home.  Other Important info for NEXT shift: CIWAr checks, been 0, on K+ protocol, Home O2 assessment completed; needs 6L O2 w/ exertion  Anticipated DC date & active delays: TBD  ___________________________________________  SUMMARY NOTE:   Orientation/Cognitive: A&O x4  Observation Goals (Met/ Not Met): Inpatient.  Mobility Level/Assist Equipment: IND.  Antibiotics & Plan (IV/po, length of tx left): n/a.  Pain Management: Denies pain.  Tele/VS/O2:  VSS on 2L NC.  O2 needs increase with ambulation.   ABNL Lab/BG:K+4.6  Diet: Vegetarian/Mod CHO.  Bowel/Bladder: Continent.    Skin Concerns: BLE and Yosi wrist edima (baseline wrist edema per pt)  +2 wirsts; +3 right leg, +4 left leg.   Drains/Devices: PIV SL.  Patient Stated Goal for Today: Rest.

## 2024-09-06 NOTE — PROGRESS NOTES
[facility]    Medicine Progress Note - Hospitalist Service    Date of Admission:  9/2/2024    Assessment & Plan      Lesli Jackson is a 63 year old female with PMH of  HTN, HLP, DM2, Tobacco use D/O, Obesity, Chronic bilateral lower extremity edema, Alcohol use D/O who was admitted on 9/2/2024 for acute hypoxic respiratory failure secondary to suspected COPD exacerbation.       Acute hypoxic hypercapnic respiratory failure with new O2 requirement since ED presentation  Suspected COPD exacerbation with bicarb of 34 lower extremity edema  *has felt sick for ~ 1 week prior to admission with URI symptoms (sore throat, runny nose, and mostly nonproductive cough).  She has taken a COVID test twice and both were negative.  *Two-view chest x-ray 9/2/24 showed normal heart size and pulmonary vascularity, clear lungs without evidence of pleural effusion but noted mild degenerative changes in the thoracic spine.  *No formal diagnosis of COPD but symptoms consistent with COPD with acute shortness of breath, wheezing, somewhat productive cough and continued tobacco use.    - - Scheduled DuoNebs 4 times a day and every 4 hours PRN.    - Mucinex 1200 mg BID.    - IS/Flutter valve use encouraged.    -Wean FiO2, SpO2 goal greater than equal to 90% monitor O2 saturations.  - RCAT requested, aggressive pulmonary hygiene with incentive promontory and flutter valve  - Outpatient referral to Pulmonology at discharge.  -Ambulate 3 times daily  -Checking RSV COVID, flu--> all are negative   -Duplex negative and CTA shows centrilobular empyhsema and no PE  -Oxygen assessment shows needing 6 L with exertion  -Patient's prednisone changed to Solu-Medrol  -Echocardiogram ordered for bilateral lower extremity edema to look for congestive heart failure           Lower extremity edema chronic, secondary lymphedema worse than usual  *Coupled with acute hypoxic respiratory failure entertained idea of VTE, no personal prior  "history of such  -Duplex no DVT      Tobacco Use D/O   Patient currently smokes ~ 10 cigarettes/day ready to quit effective date of discharge  - Counseled regarding smoking cessation that should also continue with PCP.    - Nicoderm patch offered patient declined     HTN  -Continue PTA lisinopril 60 mg/d, Coreg 6.25 mg BID, hydrochlorothiazide 25 mg/d.  Hold parameters in place.       HLP  -Continue PTA lovastatin 10 mg at bedtime.       DM2 with hyperglycemia     *A1c from 3/8/2024 elevated at 7.5%.  Patient does not appear to be on any medications.  Reviewed PCP note from 5/2024 in which she elected to proceed with diet and life style modifications.    - BMP ordered for 9/3.    -Anticipate that alcohol cessation will help with glycemic control  -change to mod CHO diet  -recheck A1c     Obesity   BMI ~ 37.61.    Increase in all-cause morbidity and mortality.   - Follow up with PCP regarding ongoing management.        # Hypokalemia  -Potassium replacement      Alcohol use D/O  *Patient reportedly consumes 3-4 drinks per day.    -Was counselled by MARIOLA thompson with ongoing use she endorses readiness to quit      Family updated significant other at bedside     Diet: Combination Diet Vegetarian Diet; Moderate Consistent Carb (60 g CHO per Meal) Diet    DVT Prophylaxis: Enoxaparin (Lovenox) SQ  Pryor Catheter: Not present  Lines: None     Cardiac Monitoring: None  Code Status: Full Code      Clinically Significant Risk Factors        # Hypokalemia: Lowest K = 3.3 mmol/L in last 2 days, will replace as needed           # Hypertension: Noted on problem list          # DMII: A1C = 7.1 % (Ref range: <5.7 %) within past 6 months, PRESENT ON ADMISSION  # Obesity: Estimated body mass index is 35.64 kg/m  as calculated from the following:    Height as of this encounter: 1.803 m (5' 11\").    Weight as of this encounter: 115.9 kg (255 lb 8.2 oz)., PRESENT ON ADMISSION       # Financial/Environmental Concerns: " none               Disposition Plan     Medically Ready for Discharge: Anticipated Tomorrow             Mahsa Lawrence MD  Hospitalist Service  [facility]  Securely message with [employer] (more info)  Text page via [employer] Paging/Directory   ______________________________________________________________________    Interval History     Patient seen and examined at bedside,  no acute overnight events   Denies any chest pain   Improved wheezing and improved shortness of breath    Discussed with patient's nurse  Physical Exam   Vital Signs: Temp: 98  F (36.7  C) Temp src: Oral BP: (!) 154/86 Pulse: 63   Resp: 18 SpO2: 91 % O2 Device: Nasal cannula Oxygen Delivery: 2 LPM  Weight: 255 lbs 8.21 oz    Physical Exam  Cardiovascular:      Rate and Rhythm: Normal rate and regular rhythm.   Pulmonary:      Effort: Pulmonary effort is normal. No respiratory distress.      Breath sounds: Wheezing present.   Abdominal:      General: There is no distension.      Palpations: Abdomen is soft.      Tenderness: There is no abdominal tenderness.   Neurological:      Mental Status: She is alert.          Medical Decision Making       42 MINUTES SPENT BY ME on the date of service doing chart review, history, exam, documentation & further activities per the note.      Data     I have personally reviewed the following data over the past 24 hrs:    8.9  \   13.1   / 164     139 96 (L) 14.0 /  128 (H)   4.2 34 (H) 0.63 \       Imaging results reviewed over the past 24 hrs:   No results found for this or any previous visit (from the past 24 hour(s)).

## 2024-09-06 NOTE — PROGRESS NOTES
Oxygen Documentation  I certify that this patient, Lesli Jackson has been under my care (or a nurse practitioner or physican's assistant working with me). This is the face-to-face encounter for oxygen medical necessity.      At the time of this encounter, I have reviewed the qualifying testing and have determined that supplemental oxygen is reasonable and necessary and is expected to improve the patient's condition in a home setting.         Patient has continued oxygen desaturation due to COPD J44.9.    If portability is ordered, is the patient mobile within the home? yes    Was this visit performed as a telehealth visit: No    Based on oxygen Assessment           *Pulse oximetry (SpO2) = 88% on room air at rest while awake.     *SpO2 improved to 92% on 2 liters/minute at rest.     *SpO2 = 84% on room air during activity/with exercise.     *SpO2 improved to 92% on 2 liters/minute during activity/with exercise.

## 2024-09-06 NOTE — DISCHARGE SUMMARY
[facility]    Hospitalist Discharge Summary       Date of Admission:  9/2/2024  Date of Discharge:  9/6/2024  6:33 PM  Discharging Provider: Mahsa Lawrence MD      Discharge Diagnoses     -Transition of care    -Incidental Finding  Follow-ups Needed After Discharge   Follow-up Appointments     Follow-up and recommended labs and tests       Follow up with primary care provider, Gretchen Vargas, within 7   days for hospital follow- up.      Follow with Pulmonology in 2-3 months            Unresulted Labs Ordered in the Past 30 Days of this Admission       No orders found from 8/3/2024 to 9/3/2024.        Hospital Course   Lesli Jackson is a 63 year old female with PMH of  HTN, HLP, DM2, Tobacco use D/O, Obesity, Chronic bilateral lower extremity edema, Alcohol use D/O who was admitted on 9/2/2024 for acute hypoxic respiratory failure secondary to suspected COPD exacerbation.       Acute hypoxic hypercapnic respiratory failure with new O2 requirement since ED presentation  Suspected COPD exacerbation with bicarb of 34 lower extremity edema  *has felt sick for ~ 1 week prior to admission with URI symptoms (sore throat, runny nose, and mostly nonproductive cough).  She has taken a COVID test twice and both were negative.  atient reported that she has felt sick for ~ 1 week prior to admission with URI symptoms (sore throat, runny nose, and mostly nonproductive cough).  She has taken a COVID test twice and both were negative.  It was noted that she has bilateral lower extremity edema that is normal for her.   Notably, patient was seen at [facility] earlier on day of admission.  She had presented to the Urgent Care due to sudden onset of shortness of breath and noted oxygen saturations at 84-86% on room air.       Work-up in the ED included an EKG that showed sinus rhythm with left axis deviation and right bundle branch block.  CMP revealed a chloride of 96, CO2 of 36  glucose of 137 otherwise unremarkable.  CBC with differential was unremarkable.  ProBNP was unremarkable at 289.  Two-view chest x-ray showed normal heart size and pulmonary vascularity, clear lungs without evidence of pleural effusion but noted mild degenerative changes in the thoracic spine.       *Two-view chest x-ray 9/2/24 showed normal heart size and pulmonary vascularity, clear lungs without evidence of pleural effusion but noted mild degenerative changes in the thoracic spine.  *No formal diagnosis of COPD but symptoms consistent with COPD with acute shortness of breath, wheezing, somewhat productive cough and continued tobacco use.    - - RCAT requested, aggressive pulmonary hygiene with incentive promontory and flutter valve  - Outpatient referral to Pulmonology at discharge.  -Ambulate 3 times daily  -Checking RSV COVID, flu--> all are negative   -Duplex negative and CTA shows centrilobular empyhsema and no PE  -Patient treated with nebulizers, steroids, mucolytics and condition improved  -She qualified for home oxygen  -Patient discharged on steroid taper and albuterol inhaler with close follow-up with the primary care and also pulmonology  -Counseled extensively on quitting smoking           Lower extremity edema chronic, secondary lymphedema worse than usual  *Coupled with acute hypoxic respiratory failure entertained idea of VTE, no personal prior history of such  -Duplex no DVT  -The left ventricle is normal in size.  The visual ejection fraction is 60-65%.  Diastolic Doppler findings (E/E' ratio and/or other parameters) suggest left  ventricular filling pressures are increased.  No regional wall motion abnormalities noted.  There is mild mitral stenosis.  There is sclerosis, calcification, and restriction of the aortic valve opening  compatible with trivial aortic stenosis.  -Patient should also follow-up with primary care and patient may need cardiology referral for increased diastolic  pressure      Tobacco Use D/O   Patient currently smokes ~ 10 cigarettes/day ready to quit effective date of discharge  - Counseled regarding smoking cessation that should also continue with PCP.    - Nicoderm patch offered patient declined     HTN  -Continue PTA lisinopril 60 mg/d, Coreg 6.25 mg BID, hydrochlorothiazide 25 mg/d.  Hold parameters in place.       HLP  -Continue PTA lovastatin 10 mg at bedtime.       DM2 with hyperglycemia     *A1c from 3/8/2024 elevated at 7.5%.  Patient does not appear to be on any medications.  Reviewed PCP note from 5/2024 in which she elected to proceed with diet and life style modifications.    - BMP ordered for 9/3.    -Anticipate that alcohol cessation will help with glycemic control  -change to mod CHO diet  -Hemoglobin A1c was 7.1  -Diabetic supplies were prescribed and also diabetic education referral provided  -Patient okay to take metformin while she is on steroids and she will follow-up with her primary care for further management of diabetes     Obesity   BMI ~ 37.61.    Increase in all-cause morbidity and mortality.   - Follow up with PCP regarding ongoing management.       Alcohol use D/O  *Patient reportedly consumes 3-4 drinks per day.    -Was counselled by MARIOLA Kristina thompson with ongoing use she endorses readiness to quit  -I counseled again on discharge for cessation       Consultations This Hospital Stay   CARE MANAGEMENT / SOCIAL WORK IP CONSULT    Code Status   Full Code    Time Spent on this Encounter   I,Mahsa Lawrence, personally saw the patient today and spent approximately greater than 30 minutes minutes discharging this patient.       Mahsa Lawrence MD  [facility]  ______________________________________________________________________    Physical Exam   Vital Signs: Temp: 97.9  F (36.6  C) Temp src: Oral BP: (!) 160/88 Pulse: 52   Resp: 18 SpO2: 92 % O2 Device: Nasal cannula Oxygen Delivery: 2  LPM  Weight: 255 lbs 8.21 oz    Physical Exam  Cardiovascular:      Rate and Rhythm: Normal rate and regular rhythm.   Pulmonary:      Effort: Pulmonary effort is normal. No respiratory distress.      Breath sounds: Wheezing present.   Abdominal:      General: There is no distension.      Palpations: Abdomen is soft.      Tenderness: There is no abdominal tenderness.   Musculoskeletal:      Right lower leg: Edema present.      Left lower leg: Edema present.              Primary Care Physician   Gretchen Vargas    Discharge Disposition   Discharged to home  Condition at discharge: Stable    Significant Results and Procedures   Most Recent 3 CBC's:  Recent Labs   Lab Test 09/05/24  0633 09/03/24  0701 09/02/24  1539   WBC 8.9 8.9 9.0   HGB 13.1 14.4 15.6   MCV 93 93 93    166 172     Most Recent 3 BMP's:  Recent Labs   Lab Test 09/06/24  0839 09/05/24  1630 09/05/24  0633 09/03/24  0701     --  139 137   POTASSIUM 4.6 4.2 3.3* 3.5   CHLORIDE 94*  --  96* 95*   CO2 33*  --  34* 34*   BUN 14.0  --  14.0 9.8   CR 0.58  --  0.63 0.58   ANIONGAP 10  --  9 8   MINA 9.7  --  9.3 9.6   *  --  128* 143*     Most Recent 2 LFT's:  Recent Labs   Lab Test 09/02/24  1539 03/08/24  1022   AST 38 28   ALT 20 19   ALKPHOS 98 88   BILITOTAL 0.6 0.5     Most Recent 3 INR's:No lab results found.  Most Recent 3 Troponin's:No lab results found.  Most Recent 3 BNP's:  Recent Labs   Lab Test 09/02/24  1539   NTBNPI 289     Most Recent D-dimer:  Recent Labs   Lab Test 09/03/24  1359   DD 2.81*     Most Recent Cholesterol Panel:  Recent Labs   Lab Test 03/08/24  1022   CHOL 163   LDL 83   HDL 44*   TRIG 179*       Results for orders placed or performed during the hospital encounter of 09/02/24   XR Chest 2 Views    Narrative    EXAM: XR CHEST 2 VIEWS  LOCATION: [facility]  DATE: 9/2/2024    INDICATION: Shortness of breath. Wheezing.  COMPARISON: 6/1/2008      Impression    IMPRESSION: Normal  heart size and pulmonary vascularity. Lungs are clear. No pleural effusions. Mild degenerative changes in the thoracic spine.   US Lower Extremity Venous Duplex Bilateral    Narrative    VENOUS ULTRASOUND BILATERAL LEG(S)  9/3/2024 3:34 PM     HISTORY: Asymmetric lower extremity edema. Left greater than right  lower extremity swelling.    COMPARISON: None.    FINDINGS: Examination of the deep veins with graded compression and  color flow Doppler with spectral wave form analysis was performed.  Images show no evidence of thrombus in the bilateral common femoral  vein, femoral vein, popliteal vein or calf veins. Complex fluid  collection in the left popliteal fossa measuring 6.9 x 3.9 x 0.9 cm,  with no internal color flow. This likely represents a popliteal fossa  cyst.      Impression    IMPRESSION:   1. No deep vein thrombosis in either lower extremity.   2. Complex fluid collection left popliteal fossa, likely popliteal  fossa cyst.     YADIRA BOATENG DO         SYSTEM ID:  [id number]   CT Chest Pulmonary Embolism w Contrast    Narrative    EXAM: CT CHEST PULMONARY EMBOLISM W CONTRAST  LOCATION: [facility]  DATE: 9/3/2024    INDICATION: Acute hypoxic respiratory failure.   COMPARISON: None.  TECHNIQUE: CT chest pulmonary angiogram during arterial phase injection of IV contrast. Multiplanar reformats and MIP reconstructions were performed. Dose reduction techniques were used.   CONTRAST: 80mL isovue 370    FINDINGS:    ANGIOGRAM CHEST: Pulmonary arteries are normal caliber and negative for pulmonary emboli. Thoracic aorta is normal in caliber and suboptimally opacified; no evidence of acute aortic pathology. Mild atherosclerotic calcifications within the aortic arch   and descending thoracic aorta.    LUNGS AND PLEURA: Mild upper lobe predominant centrilobular emphysema. Mild central bronchial wall thickening. No consolidations, pleural effusions, or pneumothorax. Punctate right lower  lobe calcified granuloma. Few scattered small thin-walled pulmonary   cysts.     MEDIASTINUM/AXILLAE: Normal cardiac size. Mitral annular calcifications. No pericardial effusion. No enlarged thoracic lymph node.     CORONARY ARTERY CALCIFICATION: Moderate.    UPPER ABDOMEN: Simple right upper pole renal cyst, which does not require follow-up.     MUSCULOSKELETAL: Multilevel degenerative changes of the spine. No acute bony abnormalities.       Impression    IMPRESSION:    1.  No pulmonary embolism.    2.  Mild inflammatory central bronchial wall thickening. No consolidations or pleural effusions.    3.  Mild upper lobe predominant centrilobular emphysema.    4.  Moderate coronary arterial calcifications.    Echocardiogram Complete     Value    LVEF  60-65%    [facility]    [id number]  [id number]  [id number]  [id number]^ROSALBA^JUAREZ^LOVE     [facility]  Echocardiography Laboratory  [address] [place] [address]     Name: JADE TO  MRN: [id number]  : 1960  Study Date: 2024 09:59 AM  Age: 63 yrs  Gender: Female  Patient Location: [facility]  Reason For Study: CHF  Ordering Physician: JUAERZ NAVARRETE  Referring Physician: Gretchen Vargas  Performed By: Narayan Kramer RDCS     BSA: 2.3 m2  Height: 71 in  Weight: 255 lb  HR: 72  BP: 137/73 mmHg  ______________________________________________________________________________  Procedure  Complete Portable Echo Adult. Optison (NDC #[id number]) given intravenously.  Complete Echo Adult.  ______________________________________________________________________________  Interpretation Summary     The left ventricle is normal in size.  The visual ejection fraction is 60-65%.  Diastolic Doppler findings (E/E' ratio and/or other parameters) suggest left  ventricular filling pressures are increased.  No regional wall motion abnormalities noted.  There is mild mitral stenosis.  There is sclerosis, calcification, and restriction  of the aortic valve opening  compatible with trivial aortic stenosis.  ______________________________________________________________________________  Left Ventricle  The left ventricle is normal in size. There is normal left ventricular wall  thickness. The visual ejection fraction is 60-65%. Diastolic Doppler findings  (E/E' ratio and/or other parameters) suggest left ventricular filling  pressures are increased. No regional wall motion abnormalities noted.     Right Ventricle  The right ventricle is normal in size and function.     Atria  Normal left atrial size. Right atrial size is normal. There is no color  Doppler evidence of an atrial shunt.     Mitral Valve  The mitral valve leaflets are mildly thickened. There is moderate mitral  annular calcification. There is trace mitral regurgitation. The mean mitral  valve gradient is 4.6 mmHg. There is mild mitral stenosis.     Tricuspid Valve  There is mild to moderate (1-2+) tricuspid regurgitation. IVC diameter and  respiratory changes fall into an intermediate range suggesting an RA pressure  of 8 mmHg. The right ventricular systolic pressure is approximated at 37.6  mmHg plus the right atrial pressure.     Aortic Valve  The aortic valve is not well visualized. No aortic regurgitation is present.  The mean AoV pressure gradient is 8.0 mmHg. The calculated aortic valve are is  2.5 cm^2. There is sclerosis, calcification, and restriction of the aortic  valve opening compatible with trivial aortic stenosis.     Pulmonic Valve  There is trace pulmonic valvular regurgitation.     Vessels  Normal size aorta. The aortic root is normal size.     Pericardium  There is no pericardial effusion.     Rhythm  Sinus rhythm was noted.  ______________________________________________________________________________  MMode/2D Measurements & Calculations  IVSd: 1.1 cm     LVIDd: 5.0 cm  LVIDs: 2.5 cm  LVPWd: 1.2 cm  FS: 50.6 %  LV mass(C)d: 220.3 grams  LV mass(C)dI: 94.2  grams/m2  Ao root diam: 3.5 cm  LA dimension: 3.9 cm  asc Aorta Diam: 3.4 cm  LA/Ao: 1.1  LVOT diam: 2.0 cm  LVOT area: 3.1 cm2  Ao root diam index Ht(cm/m): 2.0  Ao root diam index BSA (cm/m2): 1.5  Asc Ao diam index BSA (cm/m2): 1.5  Asc Ao diam index Ht(cm/m): 1.9  RV Base: 3.6 cm  RWT: 0.48  TAPSE: 3.5 cm     Doppler Measurements & Calculations  MV E max serge: 140.0 cm/sec  MV A max serge: 157.0 cm/sec  MV E/A: 0.89     MV max P.7 mmHg  MV mean P.6 mmHg  MV V2 VTI: 50.4 cm  MVA(VTI): 2.0 cm2  MV dec time: 0.27 sec  Ao V2 max: 187.0 cm/sec  Ao max P.0 mmHg  Ao V2 mean: 129.0 cm/sec  Ao mean P.0 mmHg  Ao V2 VTI: 41.7 cm  LESLIE(I,D): 2.5 cm2  LESLIE(V,D): 2.4 cm2  LV V1 max P.3 mmHg  LV V1 max: 144.0 cm/sec  LV V1 VTI: 33.0 cm  SV(LVOT): 102.2 ml  SI(LVOT): 43.7 ml/m2  PA acc time: 0.18 sec  TR max serge: 306.4 cm/sec  TR max P.6 mmHg  AV Serge Ratio (DI): 0.77  LESLIE Index (cm2/m2): 1.0  E/E' av.6  Lateral E/e': 17.4  Medial E/e': 15.9  RV S Serge: 17.7 cm/sec     ______________________________________________________________________________  Report approved by: Moody Conn 2024 02:25 PM             Discharge Orders      Primary Care - Care Coordination Referral      Adult Pulmonary Medicine  Referral      Adult Diabetes Education  Referral      Reason for your hospital stay    Hypoxic respiratory failure, Possible copd exacerbation     Follow-up and recommended labs and tests     Follow up with primary care provider, Gretchen Vargas, within 7 days for hospital follow- up.      Follow with Pulmonology in 2-3 months     Activity    Your activity upon discharge: activity as tolerated     Oxygen Adult/Peds    Oxygen Documentation  I certify that this patient, Lesli Jackson has been under my care (or a nurse practitioner or physican's assistant working with me). This is the face-to-face encounter for oxygen medical necessity.      At the time of this encounter, I have  reviewed the qualifying testing and have determined that supplemental oxygen is reasonable and necessary and is expected to improve the patient's condition in a home setting.         Patient has continued oxygen desaturation due to COPD J44.9.    If portability is ordered, is the patient mobile within the home? yes    Was this visit performed as a telehealth visit: No     Diet    Follow this diet upon discharge: Current Diet:Orders Placed This Encounter      Combination Diet Vegetarian Diet; Moderate Consistent Carb (60 g CHO per Meal) Diet     Discharge Medications   Current Discharge Medication List        START taking these medications    Details   albuterol (PROAIR HFA/PROVENTIL HFA/VENTOLIN HFA) 108 (90 Base) MCG/ACT inhaler Inhale 2 puffs into the lungs every 6 hours as needed for shortness of breath, wheezing or cough.  Qty: 18 g, Refills: 0    Comments: Pharmacy may dispense brand covered by insurance (Proair, or proventil or ventolin or generic albuterol inhaler)  Associated Diagnoses: COPD exacerbation (H)      blood glucose (NO BRAND SPECIFIED) test strip Use to test blood sugar once daily  Qty: 100 strip, Refills: 6    Comments: To accompany: glucometer per insurance.  Associated Diagnoses: Type 2 diabetes mellitus with hyperglycemia, without long-term current use of insulin (H)      blood glucose monitoring (NO BRAND SPECIFIED) meter device kit Use to test blood sugar once daily  Qty: 1 kit, Refills: 0    Comments: Preferred blood glucose meter OR supplies to accompany: glucometer per insurance  Associated Diagnoses: Type 2 diabetes mellitus with hyperglycemia, without long-term current use of insulin (H)      metFORMIN (GLUCOPHAGE) 500 MG tablet Take once daily for 3 days and then increase it to twice daily  Qty: 60 tablet, Refills: 0    Associated Diagnoses: Type 2 diabetes mellitus with hyperglycemia, without long-term current use of insulin (H)      predniSONE (DELTASONE) 10 MG tablet Take 4 tablets  (40 mg) by mouth daily for 3 days, THEN 3 tablets (30 mg) daily for 3 days, THEN 2 tablets (20 mg) daily for 2 days, THEN 1 tablet (10 mg) daily for 1 day.  Qty: 26 tablet, Refills: 0    Associated Diagnoses: COPD exacerbation (H)      sodium chloride (OCEAN) 0.65 % nasal spray As needed for dryness  Qty: 30 mL, Refills: 0    Associated Diagnoses: COPD exacerbation (H)      thin (NO BRAND SPECIFIED) lancets Use with lanceting device.  Qty: 100 each, Refills: 0    Comments: To accompany: per insurance.  Associated Diagnoses: Type 2 diabetes mellitus with hyperglycemia, without long-term current use of insulin (H)           CONTINUE these medications which have NOT CHANGED    Details   carvedilol (COREG) 6.25 MG tablet Take 1 tablet (6.25 mg) by mouth 2 times daily (with meals) for 360 days  Qty: 60 tablet, Refills: 11    Associated Diagnoses: Essential hypertension with goal blood pressure less than 140/90      Flaxseed, Linseed, 1000 MG CAPS Take 6 capsules by mouth daily.      hydrochlorothiazide (HYDRODIURIL) 50 MG tablet Take 0.5 tablets (25 mg) by mouth daily  Qty: 45 tablet, Refills: 3    Associated Diagnoses: Essential hypertension with goal blood pressure less than 140/90      lisinopril (ZESTRIL) 40 MG tablet Take 1.5 tablets (60 mg) by mouth daily  Qty: 45 tablet, Refills: 11    Associated Diagnoses: Essential hypertension with goal blood pressure less than 140/90      lovastatin (MEVACOR) 20 MG tablet Take 0.5 tablets (10 mg) by mouth at bedtime TAKE 1/2 TABLET(10 MG) BY MOUTH EVERY NIGHT AT BEDTIME  Qty: 45 tablet, Refills: 1    Associated Diagnoses: Hyperlipidemia LDL goal <130      methylcellulose (CITRUCEL) powder Take 0.85 g (3 teaspoonful) by mouth daily    Associated Diagnoses: Hyperlipidemia LDL goal <130      MULTIPLE VIT-MIN-CALCIUM-FA PO Take 1 tablet by mouth daily.           Allergies   Allergies   Allergen Reactions    Amoxicillin Rash

## 2024-09-06 NOTE — PLAN OF CARE
Goal Outcome Evaluation:         Patient has been assessed for Home Oxygen needs. Oxygen readings:    *Pulse oximetry (SpO2) = 88% on room air at rest while awake.    *SpO2 improved to 92% on 2 liters/minute at rest.    *SpO2 = 84% on room air during activity/with exercise.    *SpO2 improved to 92% on 2 liters/minute during activity/with exercise.

## 2024-09-06 NOTE — PLAN OF CARE
Goal Outcome Evaluation:      Plan of Care Reviewed With: patient    PRIMARY Concern: Acute hypoxic respiratory failure. Suspected COPD exacerbation  SAFETY RISK Concerns (fall risk, behaviors, etc.): None  Isolation/Type: n/a  Tests/Procedures for NEXT shift: Echo pending  Consults? (Pending/following, signed-off?) RT following for scheduled nebs  Where is patient from? (Home, TCU, etc.): Home  Other Important info for NEXT shift: re-eval home O2 assessments tomorrow  Anticipated DC date & active delays: tomorrow pending O2 needs  _______________________________________________________________________  SUMMARY NOTE:  Orientation/Cognitive: A&Ox4  Observation Goals (Met/ Not Met): Inpatient   Mobility Level/Assist Equipment: Independent  Antibiotics & Plan (IV/po, length of tx left): escalated to IV solumedrol this shift  Pain Management: Denies  Tele/VS/O2:  VSS on 2L O2 ex BP elevated 167/88, gave scheduled BP meds. Encouraged IS.  ABNL Lab/BG: see lab result  Diet: Vegetarian/CHO  Bowel/Bladder: Continent  Skin Concerns: BLE edema, +3 edema in left leg, +2 in right leg. +2 Edema in R/L wrists (baseline).   Drains/Devices: PIV SL.  Patient Stated Goal for Today: Hopeful to discharge tomorrow 9/6

## 2024-09-09 ENCOUNTER — PATIENT OUTREACH (OUTPATIENT)
Dept: CARE COORDINATION | Facility: CLINIC | Age: 64
End: 2024-09-09
Payer: COMMERCIAL

## 2024-09-09 NOTE — PROGRESS NOTES
"Clinic Care Coordination Contact  Transitions of Care Outreach  Chief Complaint   Patient presents with    Clinic Care Coordination - Post Hospital    Clinic Care Coordination - Initial     Most Recent Admission Date: 9/2/2024   Most Recent Admission Diagnosis: Hypoxia - R09.02  COPD exacerbation (H) - J44.1   Most Recent Discharge Date: 9/6/2024   Most Recent Discharge Diagnosis: COPD exacerbation (H) - J44.1  Hypoxia - R09.02  Hyperlipidemia LDL goal <130 - E78.5  Type 2 diabetes mellitus with hyperglycemia, without long-term current use of insulin (H) - E11.65     Transitions of Care Assessment  Discharge Assessment  How are you doing now that you are home?: \"feeling quite a bit better\"  How are your symptoms? (Red Flag symptoms escalate to triage hotline per guidelines): Improved  Do you know how to contact your clinic care team if you have future questions or changes to your health status? : Yes  Does the patient have their discharge instructions? : Yes  Does the patient have questions regarding their discharge instructions? : No  Were you started on any new medications or were there changes to any of your previous medications? : Yes  Does the patient have all of their medications?: Yes  Do you have questions regarding any of your medications? : No  Do you have all of your needed medical supplies or equipment (DME)?  (i.e. oxygen tank, CPAP, cane, etc.): Yes         Post-op (Clinicians Only)  Did the patient have surgery or a procedure: No    Follow up Plan   Discharge Follow-Up  Discharge follow up appointment scheduled in alignment with recommended follow up timeframe or Transitions of Risk Category? (Low = within 30 days; Moderate= within 14 days; High= within 7 days): Yes  Discharge Follow Up Appointment Date: 09/10/24  Discharge Follow Up Appointment Scheduled with?: Primary Care Provider    Future Appointments   Date Time Provider Department Center   9/10/2024 11:40 AM Zulema Shelby APRN CNP [facility] "   10/24/2024  1:50 PM Gretchen Vargas MD [facility]     Outpatient Plan as outlined on AVS reviewed with patient.    For any urgent concerns, please contact our 24 hour nurse triage line: [phone] ([phone])       Leela Gómez RN

## 2024-09-10 ENCOUNTER — OFFICE VISIT (OUTPATIENT)
Dept: FAMILY MEDICINE | Facility: CLINIC | Age: 64
End: 2024-09-10
Payer: COMMERCIAL

## 2024-09-10 VITALS
HEART RATE: 60 BPM | SYSTOLIC BLOOD PRESSURE: 126 MMHG | RESPIRATION RATE: 20 BRPM | DIASTOLIC BLOOD PRESSURE: 78 MMHG | BODY MASS INDEX: 35.76 KG/M2 | WEIGHT: 256.4 LBS | OXYGEN SATURATION: 95 % | TEMPERATURE: 97.3 F

## 2024-09-10 DIAGNOSIS — Z09 HOSPITAL DISCHARGE FOLLOW-UP: Primary | ICD-10-CM

## 2024-09-10 DIAGNOSIS — J44.1 COPD EXACERBATION (H): Primary | ICD-10-CM

## 2024-09-10 DIAGNOSIS — R68.89 EXERCISE INTOLERANCE: ICD-10-CM

## 2024-09-10 DIAGNOSIS — E66.01 SEVERE OBESITY (BMI 35.0-35.9 WITH COMORBIDITY) (H): ICD-10-CM

## 2024-09-10 DIAGNOSIS — R60.0 BILATERAL LOWER EXTREMITY EDEMA: ICD-10-CM

## 2024-09-10 DIAGNOSIS — E11.65 TYPE 2 DIABETES MELLITUS WITH HYPERGLYCEMIA, WITHOUT LONG-TERM CURRENT USE OF INSULIN (H): ICD-10-CM

## 2024-09-10 DIAGNOSIS — J44.1 COPD EXACERBATION (H): ICD-10-CM

## 2024-09-10 PROCEDURE — 99495 TRANSJ CARE MGMT MOD F2F 14D: CPT

## 2024-09-10 ASSESSMENT — PAIN SCALES - GENERAL: PAINLEVEL: NO PAIN (0)

## 2024-09-10 NOTE — PROGRESS NOTES
Assessment & Plan     Hospital discharge follow-up  COPD exacerbation (H)  Exercise intolerance  Patient doing well post-hospital discharge, remains on 2L oxygen via nasal cannula continuously which is new for her since discharge. She would like to know more about her prognosis in regards to her COPD- pulmonology referral was placed in hospital and they should be reaching out to schedule, I did give her the # to schedule as well.   She reports she has not smoked any cigarettes since being home and does not plan to. She declines need for nicotine gum, patch, lozenges.   She would like a referral to PT for ways to help her with exercises given she has a hard time with her lungs to tolerate exercises/movements. Referral placed.   - Physical Therapy  Referral    Type 2 diabetes mellitus with hyperglycemia, without long-term current use of insulin (H)  Severe obesity (BMI 35.0-35.9 with comorbidity) (H)  Metformin initiated in hospital, sent in refill to pharmacy to continue this. Diabetic educator referral was placed in hospital, looks like they have not reached out yet so I also gave her this scheduling number. Should have follow-up to continue monitoring of diabetes.   She is interested in ways to control her diet as well which diabetic educator can be a great resource.   - metFORMIN (GLUCOPHAGE) 500 MG tablet  Dispense: 180 tablet; Refill: 0    Bilateral lower extremity edema  Lymphedema referral placed for chronic bilateral lower extremity edema. Recommended keeping legs elevated above level of the heart when resting at home. She does not want to wear compression stockings.   Hospital with diastolic doppler findings suggesting left ventricular fillings increased, referral placed to cardiology as well noted for upcoming PCP visit in October in appt notes for further evaluation.   - Lymphedema Therapy  Referral        MED REC REQUIRED  Post Medication Reconciliation Status: discharge medications  reconciled, continue medications without change      Subjective   Lesli is a 63 year old, presenting for the following health issues:  Hospital F/U      9/10/2024    11:21 AM   Additional Questions   Roomed by Nithya   Accompanied by alone         9/10/2024    11:21 AM   Patient Reported Additional Medications   Patient reports taking the following new medications none     HPI        Hospital Follow-up Visit:    Hospital/Nursing Home/IP Rehab Facility: [facility]  Date of Admission: 9/2/24  Date of Discharge: 9/6/24  Reason(s) for Admission: copd exacerbation with bicarb of 34 lower extremity edema  Was the patient in the ICU or did the patient experience delirium during hospitalization?  No  Do you have any other stressors you would like to discuss with your provider? No    Problems taking medications regularly:  None  Medication changes since discharge: None  Problems adhering to non-medication therapy:  None    Summary of hospitalization:  [facility] discharge summary reviewed  Diagnostic Tests/Treatments reviewed.  Follow up needed: pulmonology   Other Healthcare Providers Involved in Patient s Care:         Specialist appointment - pulmonology  Update since discharge: improved.         Plan of care communicated with patient      Patient here today for hospital follow-up    PMH of  HTN, HLP, DM2, Tobacco use D/O, Obesity, Chronic bilateral lower extremity edema, Alcohol use D/O who was admitted on 9/2/2024 for acute hypoxic respiratory failure secondary to suspected COPD exacerbation.     Referral was placed to pulmonology, counseling was provided on tobacco cessation. Discharged on steroid taper and albuterol inhaler.     She reports doing really well post-discharge, breathing is much improved.   On home oxygen 2L continuously.   Reports she has 2-3 more days of steroid taper.     Also initiated on metformin in the hospital, currently on one 500 mg tablet daily, increases  to twice daily today. No side effects at this point.   Was referred to diabetic educator in the hospital, she hasn't heard from them yet.     Lower extremity edema-- she reports her legs are looking much better since the hospitalization, they are at her baseline.                Review of Systems  Constitutional, HEENT, cardiovascular, pulmonary, gi and gu systems are negative, except as otherwise noted.      Objective    /78 (BP Location: Right arm, Patient Position: Sitting, Cuff Size: Adult Large)   Pulse 60   Temp 97.3  F (36.3  C) (Temporal)   Resp 20   Wt 116.3 kg (256 lb 6.4 oz)   LMP 12/22/2012   SpO2 95%   BMI 35.76 kg/m    Body mass index is 35.76 kg/m .  Physical Exam   GENERAL: alert and no distress  RESP: on 2L oxygen via nasal cannula. lungs clear to auscultation - no rales, rhonchi or wheezes  CV: regular rate and rhythm, normal S1 S2, no S3 or S4, no murmur, click or rub  MS: Nonpitting edema to bilateral lower extremities. Normal peripheral pulses.   SKIN: no suspicious lesions or rashes  NEURO: mentation intact and speech normal  PSYCH: mentation appears normal, affect normal/bright          Signed Electronically by: ALICE Salas CNP

## 2024-09-10 NOTE — Clinical Note
Future Appointments 10/24/2024 1:50 PM    Gretchen Vargas MD [facility] 11/26/2024 9:00 AM    [facility] 11/26/2024 10:30 AM   Brian Martinez MD         [facility]

## 2024-09-12 ENCOUNTER — TELEPHONE (OUTPATIENT)
Dept: FAMILY MEDICINE | Facility: CLINIC | Age: 64
End: 2024-09-12
Payer: COMMERCIAL

## 2024-09-12 NOTE — TELEPHONE ENCOUNTER
Writer called and left message on patient's voicemail to call back and speak with a triage nurse.    Writer responded via [employer].    Charleen Vogt, DARLEENN RN  [facility]

## 2024-09-12 NOTE — TELEPHONE ENCOUNTER
RN team--    I saw pt for hospital follow-up this week and was just reviewing her chart and placed a cardiology referral as they had noted some increased ventricular pressures in her heart so they can monitor this closely.     I also let her PCP know and she can discuss further with PCP at [facility] in Oct as well.     Can you let pt know that is why she's being referred to cardiology for when she gets a call to schedule?    Zulema Shelby, DNP

## 2024-09-20 ENCOUNTER — VIRTUAL VISIT (OUTPATIENT)
Dept: EDUCATION SERVICES | Facility: CLINIC | Age: 64
End: 2024-09-20
Attending: STUDENT IN AN ORGANIZED HEALTH CARE EDUCATION/TRAINING PROGRAM
Payer: COMMERCIAL

## 2024-09-20 DIAGNOSIS — E11.65 TYPE 2 DIABETES MELLITUS WITH HYPERGLYCEMIA, WITHOUT LONG-TERM CURRENT USE OF INSULIN (H): Primary | ICD-10-CM

## 2024-09-20 PROCEDURE — G0108 DIAB MANAGE TRN  PER INDIV: HCPCS | Mod: 95 | Performed by: DIETITIAN, REGISTERED

## 2024-09-20 NOTE — LETTER
9/20/2024         RE: Lesli Jackson  [address]  [facility] [address]        Dear Colleague,    Thank you for referring your patient, Lesli Jackson, to the [facility]. Please see a copy of my visit note below.    Diabetes Self-Management Education & Support    Presents for:      Type of service:  Video Visit    *Video got disconnected after a few minutes and we continued on over the phone    ASSESSMENT:    Initial diabetes education visit.  New dx - did not want to go any meds and wanted to focus on lifestyle modification  Good support from partner  Regular alcohol intake  Works full time as a  for an [employer] complex  Per the SMBG data pt's BG is elevated    SMBG:  Tests 1x/d, fasting  BG 9/7 - 20:  173, 161, 179, 126, 178, 126, 136, 110, 131, 142, 167, 168, 152, 171  *pt will start washing her hands before testing BG    Consumes 3 meals daily.   Watches her intake. Familiar with CHO foods. Is a vegetarian - eats eggs. Seldom eats out.  Food recall:  BF:cherrios, coffee (creamer)  L: salad, grapes  D: rice, schmitt (with cheese, peas)  Denies any desserts or sugary drinks on a regular basis.  *Reports that she has been trying to cut back on alcohol    Goes for short walks whenever she can. Utilizes her lunch break at times. Has knee pain.    Education/Discussion: Reviewed diabetes basics, AIC and BS goals, sx and tx of hypo and hyperglycemia, complications of uncontrolled diabetes, general activity and diet guidelines, CHO foods, concept of budgeting and balancing, planning ahead for healthy meals, examples of quick and healthy meals/snacks, plate method for portion control, meter and testing basics: pt expressed understanding.    Discussed optimal lifestyle interventions to help control blood sugars, including healthy diet and physical activity. Advised to eat a diet low in carbohydrates with increased vegetables, healthy fat, and protein.      Patient's most recent  "  Lab Results   Component Value Date    A1C 7.1 09/03/2024    A1C 6.6 01/08/2021     is meeting goal of <8.0    PLAN    Continue with current regimen.   Future:reassess for med start    Topics to cover at upcoming visits: Problem Solving and Reducing Risks    Follow-up: 6 weeks (or sooner if concerns)    See Care Plan for co-developed, patient-state behavior change goals.  AVS provided for patient today.    Education Materials Provided:  Living Healthy with Diabetes, My Plate Planner, and CHO foods and serving     SUBJECTIVE/OBJECTIVE:  Diabetes education in the past 24mo: No  Diabetes type: Other (see Comments)  Please elaborate:: Not diagnosed  Disease course: Other (see Comments)  How confident are you filling out medical forms by yourself:: Extremely  Diabetes management related comments/concerns: No  Cultural Influences/Ethnic Background:  Not  or     Diabetes Symptoms & Complications:  Diabetes Related Symptoms: None  Weight trend: Stable  Disease course: Other (see Comments)     Patient Problem List and Family Medical History reviewed for relevant medical history, current medical status, and diabetes risk factors.    Vitals:  [facility] 12/22/2012   Estimated body mass index is 35.76 kg/m  as calculated from the following:    Height as of 9/3/24: 1.803 m (5' 11\").    Weight as of 9/10/24: 116.3 kg (256 lb 6.4 oz).   Last 3 BP:   BP Readings from Last 3 Encounters:   09/10/24 126/78   09/06/24 (!) 160/88   09/02/24 (!) 152/80       History   Smoking Status     Former     Types: Cigarettes   Smokeless Tobacco     Former       Labs:  Lab Results   Component Value Date    A1C 7.1 09/03/2024    A1C 6.6 01/08/2021     Lab Results   Component Value Date     09/06/2024     03/10/2022     01/08/2021     Lab Results   Component Value Date    LDL 83 03/08/2024    LDL 96 01/08/2021     HDL Cholesterol   Date Value Ref Range Status   01/08/2021 44 (L) >49 mg/dL Final     Direct Measure HDL " "  Date Value Ref Range Status   03/08/2024 44 (L) >=50 mg/dL Final   ]  GFR Estimate   Date Value Ref Range Status   09/06/2024 >90 >60 mL/min/1.73m2 Final     Comment:     eGFR calculated using 2021 CKD-EPI equation.   01/08/2021 >90 >60 mL/min/[1.73_m2] Final     Comment:     Non  GFR Calc  Starting 12/18/2018, serum creatinine based estimated GFR (eGFR) will be   calculated using the Chronic Kidney Disease Epidemiology Collaboration   (CKD-EPI) equation.       GFR Estimate If Black   Date Value Ref Range Status   01/08/2021 >90 >60 mL/min/[1.73_m2] Final     Comment:      GFR Calc  Starting 12/18/2018, serum creatinine based estimated GFR (eGFR) will be   calculated using the Chronic Kidney Disease Epidemiology Collaboration   (CKD-EPI) equation.       Lab Results   Component Value Date    CR 0.58 09/06/2024    CR 0.67 01/08/2021     No results found for: \"MICROALBUMIN\"    Healthy Eating:  How many times a week on average do you eat food made away from home (restaurant/take-out)?: 0  Meals include: Breakfast, Lunch, Dinner  Beverages: Coffee, Alcohol    Being Active:  Exercise:: Currently not exercising  Barrier to exercise: Physical limitation    Monitoring:  Monitoring Assessed Today: Yes  Times checking blood sugar at home (number): 1  Times checking blood sugar at home (per): Day    Taking Medications:  Diabetes Medication(s)       Biguanides       metFORMIN (GLUCOPHAGE) 500 MG tablet Take 1 tablet (500 mg) by mouth 2 times daily (with meals).            Taking Medication Assessed Today: Yes  Current Treatments: Diet    Problem Solving:  Is the patient at risk for DKA?: No    Reducing Risks:  Has dilated eye exam at least once a year?: No  Sees dentist every 6 months?: Yes    Healthy Coping:  Emotional response to diabetes: Acceptance  Informal Support system:: Family, Partner  Stage of change: PREPARATION (Decided to change - considering how)  Patient Activation Measure Survey " Score:      1/19/2011     9:00 AM   HILLARY Score (Last Two)   HILLARY Raw Score 36   Activation Score 47.4   HILLARY Level 2       Time Spent: 60 minutes  Encounter Type: Individual    Any diabetes medication dose changes were made via the CDE Protocol per the patient's referring provider. A copy of this encounter was shared with the provider.

## 2024-09-20 NOTE — PROGRESS NOTES
Diabetes Self-Management Education & Support    Presents for:      Type of service:  Video Visit    *Video got disconnected after a few minutes and we continued on over the phone    ASSESSMENT:    Initial diabetes education visit.  New dx - did not want to go any meds and wanted to focus on lifestyle modification  Good support from partner  Regular alcohol intake  Works full time as a  for an apartment complex  Per the SMBG data pt's BG is elevated    SMBG:  Tests 1x/d, fasting  BG 9/7 - 20:  173, 161, 179, 126, 178, 126, 136, 110, 131, 142, 167, 168, 152, 171  *pt will start washing her hands before testing BG    Consumes 3 meals daily.   Watches her intake. Familiar with CHO foods. Is a vegetarian - eats eggs. Seldom eats out.  Food recall:  BF:cherrios, coffee (creamer)  L: salad, grapes  D: rice, schmitt (with cheese, peas)  Denies any desserts or sugary drinks on a regular basis.  *Reports that she has been trying to cut back on alcohol    Goes for short walks whenever she can. Utilizes her lunch break at times. Has knee pain.    Education/Discussion: Reviewed diabetes basics, AIC and BS goals, sx and tx of hypo and hyperglycemia, complications of uncontrolled diabetes, general activity and diet guidelines, CHO foods, concept of budgeting and balancing, planning ahead for healthy meals, examples of quick and healthy meals/snacks, plate method for portion control, meter and testing basics: pt expressed understanding.    Discussed optimal lifestyle interventions to help control blood sugars, including healthy diet and physical activity. Advised to eat a diet low in carbohydrates with increased vegetables, healthy fat, and protein.      Patient's most recent   Lab Results   Component Value Date    A1C 7.1 09/03/2024    A1C 6.6 01/08/2021     is meeting goal of <8.0    PLAN    Continue with current regimen.   Future:reassess for med start    Topics to cover at upcoming visits: Problem Solving and  "Reducing Risks    Follow-up: 6 weeks (or sooner if concerns)    See Care Plan for co-developed, patient-state behavior change goals.  AVS provided for patient today.    Education Materials Provided:  Living Healthy with Diabetes, My Plate Planner, and CHO foods and serving     SUBJECTIVE/OBJECTIVE:  Diabetes education in the past 24mo: No  Diabetes type: Other (see Comments)  Please elaborate:: Not diagnosed  Disease course: Other (see Comments)  How confident are you filling out medical forms by yourself:: Extremely  Diabetes management related comments/concerns: No  Cultural Influences/Ethnic Background:  Not  or     Diabetes Symptoms & Complications:  Diabetes Related Symptoms: None  Weight trend: Stable  Disease course: Other (see Comments)     Patient Problem List and Family Medical History reviewed for relevant medical history, current medical status, and diabetes risk factors.    Vitals:  [facility] 12/22/2012   Estimated body mass index is 35.76 kg/m  as calculated from the following:    Height as of 9/3/24: 1.803 m (5' 11\").    Weight as of 9/10/24: 116.3 kg (256 lb 6.4 oz).   Last 3 BP:   BP Readings from Last 3 Encounters:   09/10/24 126/78   09/06/24 (!) 160/88   09/02/24 (!) 152/80       History   Smoking Status    Former    Types: Cigarettes   Smokeless Tobacco    Former       Labs:  Lab Results   Component Value Date    A1C 7.1 09/03/2024    A1C 6.6 01/08/2021     Lab Results   Component Value Date     09/06/2024     03/10/2022     01/08/2021     Lab Results   Component Value Date    LDL 83 03/08/2024    LDL 96 01/08/2021     HDL Cholesterol   Date Value Ref Range Status   01/08/2021 44 (L) >49 mg/dL Final     Direct Measure HDL   Date Value Ref Range Status   03/08/2024 44 (L) >=50 mg/dL Final   ]  GFR Estimate   Date Value Ref Range Status   09/06/2024 >90 >60 mL/min/1.73m2 Final     Comment:     eGFR calculated using 2021 CKD-EPI equation.   01/08/2021 >90 >60 " "mL/min/[1.73_m2] Final     Comment:     Non  GFR Calc  Starting 12/18/2018, serum creatinine based estimated GFR (eGFR) will be   calculated using the Chronic Kidney Disease Epidemiology Collaboration   (CKD-EPI) equation.       GFR Estimate If Black   Date Value Ref Range Status   01/08/2021 >90 >60 mL/min/[1.73_m2] Final     Comment:      GFR Calc  Starting 12/18/2018, serum creatinine based estimated GFR (eGFR) will be   calculated using the Chronic Kidney Disease Epidemiology Collaboration   (CKD-EPI) equation.       Lab Results   Component Value Date    CR 0.58 09/06/2024    CR 0.67 01/08/2021     No results found for: \"MICROALBUMIN\"    Healthy Eating:  How many times a week on average do you eat food made away from home (restaurant/take-out)?: 0  Meals include: Breakfast, Lunch, Dinner  Beverages: Coffee, Alcohol    Being Active:  Exercise:: Currently not exercising  Barrier to exercise: Physical limitation    Monitoring:  Monitoring Assessed Today: Yes  Times checking blood sugar at home (number): 1  Times checking blood sugar at home (per): Day    Taking Medications:  Diabetes Medication(s)       Biguanides       metFORMIN (GLUCOPHAGE) 500 MG tablet Take 1 tablet (500 mg) by mouth 2 times daily (with meals).            Taking Medication Assessed Today: Yes  Current Treatments: Diet    Problem Solving:  Is the patient at risk for DKA?: No    Reducing Risks:  Has dilated eye exam at least once a year?: No  Sees dentist every 6 months?: Yes    Healthy Coping:  Emotional response to diabetes: Acceptance  Informal Support system:: Family, Partner  Stage of change: PREPARATION (Decided to change - considering how)  Patient Activation Measure Survey Score:      1/19/2011     9:00 AM   HILLARY Score (Last Two)   HILLARY Raw Score 36   Activation Score 47.4   HILLARY Level 2       Time Spent: 60 minutes  Encounter Type: Individual    Any diabetes medication dose changes were made via the CDE " Protocol per the patient's referring provider. A copy of this encounter was shared with the provider.

## 2024-09-20 NOTE — PATIENT INSTRUCTIONS
Mallory Ballesteros,     It was a pleasure visiting with you today. Here is a summary of our visit today.    Patient instructions:    Check blood sugars at least once daily at varying times: fasting/before meal and/or 2 hours after a meal        Blood Glucose Targets:        -Fasting and before meal target is 80 - 130        -2 hours after a meal target is < 180        -Time in range for continuous glucose monitor (Thierno OR Dexcom): at least 70% BG numbers between .     Always wash your hands before testing and remember to bring meter and/or log book to all appointments.     2.  Eat 3 balanced meals each day - Do NOT skip/delay meals - plan ahead.  - Use plate method/aim to eat a balanced plate - half your plate fruits/veggies, 1/4 the plate protein and 1/4 plate starch (rice, potato, pasta)  - Switch to healthier carbs (whole grains, legumes/beans, fruit, dairy)  - Keep kitchen/pantry well stocked with healthy snack options like nuts, veggies, fruit, cottage cheese, trail mix etc  - Drink more water.  - Do not wait longer than 4-5 hours to eat something  - Make sure you include protein source with each meal and at bedtime - this has been shown to help with blood glucose elevations     3. Activity really helps improve blood sugars.Try to Incorporate 30 minutes activity into each day - does not need to be all at one time & walking counts!     Call/mychart with any questions.    Thank you!  Negrita Kline RDN, LD, [facility]   Certified Diabetes Care &   Triage [phone]

## 2024-09-23 ENCOUNTER — TELEPHONE (OUTPATIENT)
Dept: PULMONOLOGY | Facility: CLINIC | Age: 64
End: 2024-09-23
Payer: COMMERCIAL

## 2024-09-23 NOTE — TELEPHONE ENCOUNTER
Patient Contacted for the patient to call back and schedule the following:    Appointment type: Reschedule New Pulm  Provider: Juan  Return date: 11/26/2024  Specialty phone number: [phone]  Additional appointment(s) needed: full pft  Additonal Notes: provider unavailable

## 2024-10-07 ENCOUNTER — MYC REFILL (OUTPATIENT)
Dept: FAMILY MEDICINE | Facility: CLINIC | Age: 64
End: 2024-10-07
Payer: COMMERCIAL

## 2024-10-07 ENCOUNTER — TELEPHONE (OUTPATIENT)
Dept: FAMILY MEDICINE | Facility: CLINIC | Age: 64
End: 2024-10-07
Payer: COMMERCIAL

## 2024-10-07 DIAGNOSIS — E11.65 TYPE 2 DIABETES MELLITUS WITH HYPERGLYCEMIA, WITHOUT LONG-TERM CURRENT USE OF INSULIN (H): ICD-10-CM

## 2024-10-07 NOTE — TELEPHONE ENCOUNTER
"Pt called, states she requested Metformin refill with [facility] and was denied. RN advised pt a script was sent in on 9/10/24 for 500mg, 1 tab twice daily to dispense 180 tabs (two 3 mths supply). Pt states bottle she has on hand says 60tabs but this may be a hospital supply when she was discharged. RN conference call to pharmacy and spoke to Mitesh, who confirmed scripted sent in on 9/10/24 is on file. Looks like pt submitted refill via on-line and their system is not smart enough to figure that there is new script on file. Refill was approved. Pt may  meds in a couple of hours.     Pt verb satisfaction with outcome, states \"I would have never figured this out\".     Keli HOLGUIN RN  [facility]    "

## 2024-10-08 NOTE — TELEPHONE ENCOUNTER
Standard Golytely Bowel Prep recommended due to diabetes.  Instructions were sent via [employer]. Bowel prep was sent 10/8/2024 to    [facility] #[id number] - [facility] [address] AT [facility] & [address]    Belkys Srivastava RN Colorectal Cancer   Division of Gastroenterology at [facility]

## 2024-10-15 ENCOUNTER — MYC MEDICAL ADVICE (OUTPATIENT)
Dept: FAMILY MEDICINE | Facility: CLINIC | Age: 64
End: 2024-10-15
Payer: COMMERCIAL

## 2024-10-15 NOTE — TELEPHONE ENCOUNTER
I sent the following [employer] message, nothing further to do, encounter closed.    Sincerely,  Dr. Gretchen Vargas MD  10/15/2024 3:08 PM     HI, great question!  I recommend NOT taking metformin/glucophage on the day of the procedure when you're fasting, otherwise you can take everything else as usual.  I hope the procedure goes well, thank you for getting this done!    Sincerely,  Dr. Gretchen Vargas MD  10/15/2024

## 2024-10-17 ENCOUNTER — TELEPHONE (OUTPATIENT)
Dept: GASTROENTEROLOGY | Facility: CLINIC | Age: 64
End: 2024-10-17
Payer: COMMERCIAL

## 2024-10-17 NOTE — TELEPHONE ENCOUNTER
Caller: Writer to patient    Reason for Reschedule/Cancellation   (please be detailed, any staff messages or encounters to note?): Needs hospital due to recent hospitalization for COPD.       Prior to reschedule please review:  Ordering Provider: Gretchen Vargas  Sedation Determined: CS  Does patient have any ASC Exclusions, please identify?: Y - COPD hx      Notes on Cancelled Procedure:  Procedure: Lower Endoscopy [Colonoscopy]   Date: 10/29/2024  Location: [facility]; [address] [place] [address], [address] Floor, [place] [address]  Surgeon: Grant      Rescheduled: VINEET Crowley and sent MyChart. Patient can move procedure to hospital setting, or can be rescheduled at [facility] if cleared by pulmonary team in November.        Did you cancel or rescheduled an EUS procedure? No.

## 2024-10-17 NOTE — TELEPHONE ENCOUNTER
----- Message from Marina CRESPO sent at 10/17/2024 10:19 AM CDT -----  Regarding: FW: COPD exacerbation  Hello -     Per anesthesia review, this patient will need to be rescheduled in a hospital setting.    Thank you,    Marina  ----- Message -----  From: Alfred Link APRN CRNA  Sent: 10/16/2024   4:46 PM CDT  To: Marina Downing RN  Subject: RE: COPD exacerbation                            As this is a completely elective screening, there is no need to put the patient at risk for issues at the ASC.   Please schedule in hospital or delay post pulmonary consult with hope of optimization.       Alfred Link,  ----- Message -----  From: Marina Downing RN  Sent: 10/16/2024   2:09 PM CDT  To: Marina Downing RN; ALICE Leal CRNA  Subject: COPD exacerbation                                Hello -     Patient is scheduled for a Colonoscopy  Date of procedure: 10.29.24  Indication: screening  Sedation type: Conscious sedation     Reason for review: Patient had a hospitalization on 9.2.24 for COPD exacerbation, and discharged home on continuous O2 at 2L. Has a new pulm consult scheduled in November.     If the O2 use is no longer, would you still want this patient to be rescheduled in a hospital setting?       Thank you,   Marina Downing RN  Endoscopy Procedure Pre Assessment RN  [phone] option 2

## 2024-10-17 NOTE — TELEPHONE ENCOUNTER
Pt called back to acknowledge phone call and cancellation. She stated she is happy to CB after she gets final results from Pulmonology

## 2024-10-17 NOTE — TELEPHONE ENCOUNTER
----- Message from Marina CRESPO sent at 10/17/2024 10:19 AM CDT -----  Regarding: FW: COPD exacerbation  Hello -     Per anesthesia review, this patient will need to be rescheduled in a hospital setting.    Thank you,    Marina  ----- Message -----  From: Alfred Link APRN CRNA  Sent: 10/16/2024   4:46 PM CDT  To: Marina Downing RN  Subject: RE: COPD exacerbation                            As this is a completely elective screening, there is no need to put the patient at risk for issues at the ASC.   Please schedule in hospital or delay post pulmonary consult with hope of optimization.       Alfred Link,  ----- Message -----  From: Marina Downing RN  Sent: 10/16/2024   2:09 PM CDT  To: Marina Downing RN; ALICE Leal CRNA  Subject: COPD exacerbation                                Hello -     Patient is scheduled for a Colonoscopy  Date of procedure: 10.29.24  Indication: screening  Sedation type: Conscious sedation     Reason for review: Patient had a hospitalization on 9.2.24 for COPD exacerbation, and discharged home on continuous O2 at 2L. Has a new pulm consult scheduled in November.     If the O2 use is no longer, would you still want this patient to be rescheduled in a hospital setting?       Thank you,   Marina Downing RN  Endoscopy Procedure Pre Assessment RN  [phone] option 2

## 2024-10-23 ENCOUNTER — MYC MEDICAL ADVICE (OUTPATIENT)
Dept: FAMILY MEDICINE | Facility: CLINIC | Age: 64
End: 2024-10-23
Payer: COMMERCIAL

## 2024-10-23 SDOH — HEALTH STABILITY: PHYSICAL HEALTH: ON AVERAGE, HOW MANY DAYS PER WEEK DO YOU ENGAGE IN MODERATE TO STRENUOUS EXERCISE (LIKE A BRISK WALK)?: 0 DAYS

## 2024-10-23 SDOH — HEALTH STABILITY: PHYSICAL HEALTH: ON AVERAGE, HOW MANY MINUTES DO YOU ENGAGE IN EXERCISE AT THIS LEVEL?: 10 MIN

## 2024-10-23 ASSESSMENT — SOCIAL DETERMINANTS OF HEALTH (SDOH): HOW OFTEN DO YOU GET TOGETHER WITH FRIENDS OR RELATIVES?: ONCE A WEEK

## 2024-10-24 ENCOUNTER — OFFICE VISIT (OUTPATIENT)
Dept: FAMILY MEDICINE | Facility: CLINIC | Age: 64
End: 2024-10-24
Payer: COMMERCIAL

## 2024-10-24 VITALS
BODY MASS INDEX: 38.09 KG/M2 | RESPIRATION RATE: 22 BRPM | WEIGHT: 266.1 LBS | HEIGHT: 70 IN | SYSTOLIC BLOOD PRESSURE: 130 MMHG | HEART RATE: 74 BPM | TEMPERATURE: 97.5 F | OXYGEN SATURATION: 94 % | DIASTOLIC BLOOD PRESSURE: 78 MMHG

## 2024-10-24 DIAGNOSIS — R09.02 HYPOXIA: Primary | ICD-10-CM

## 2024-10-24 DIAGNOSIS — E11.9 TYPE 2 DIABETES MELLITUS WITHOUT COMPLICATION, WITHOUT LONG-TERM CURRENT USE OF INSULIN (H): ICD-10-CM

## 2024-10-24 DIAGNOSIS — E78.5 HYPERLIPIDEMIA LDL GOAL <100: ICD-10-CM

## 2024-10-24 DIAGNOSIS — R09.02 HYPOXIA: ICD-10-CM

## 2024-10-24 DIAGNOSIS — Z00.00 ROUTINE GENERAL MEDICAL EXAMINATION AT A HEALTH CARE FACILITY: Primary | ICD-10-CM

## 2024-10-24 LAB
EST. AVERAGE GLUCOSE BLD GHB EST-MCNC: 154 MG/DL
HBA1C MFR BLD: 7 % (ref 0–5.6)

## 2024-10-24 PROCEDURE — 82570 ASSAY OF URINE CREATININE: CPT | Performed by: FAMILY MEDICINE

## 2024-10-24 PROCEDURE — 82043 UR ALBUMIN QUANTITATIVE: CPT | Performed by: FAMILY MEDICINE

## 2024-10-24 PROCEDURE — 99214 OFFICE O/P EST MOD 30 MIN: CPT | Mod: 25 | Performed by: FAMILY MEDICINE

## 2024-10-24 PROCEDURE — 82785 ASSAY OF IGE: CPT | Performed by: FAMILY MEDICINE

## 2024-10-24 PROCEDURE — 80048 BASIC METABOLIC PNL TOTAL CA: CPT | Performed by: FAMILY MEDICINE

## 2024-10-24 PROCEDURE — 80061 LIPID PANEL: CPT | Performed by: FAMILY MEDICINE

## 2024-10-24 PROCEDURE — 86003 ALLG SPEC IGE CRUDE XTRC EA: CPT | Performed by: FAMILY MEDICINE

## 2024-10-24 PROCEDURE — 83036 HEMOGLOBIN GLYCOSYLATED A1C: CPT | Performed by: FAMILY MEDICINE

## 2024-10-24 PROCEDURE — 99207 PR FOOT EXAM NO CHARGE: CPT | Performed by: FAMILY MEDICINE

## 2024-10-24 PROCEDURE — 36415 COLL VENOUS BLD VENIPUNCTURE: CPT | Performed by: FAMILY MEDICINE

## 2024-10-24 PROCEDURE — 99396 PREV VISIT EST AGE 40-64: CPT | Performed by: FAMILY MEDICINE

## 2024-10-24 ASSESSMENT — PAIN SCALES - GENERAL: PAINLEVEL_OUTOF10: NO PAIN (0)

## 2024-10-24 NOTE — RESULT ENCOUNTER NOTE
Hi, your A1c is even lower!  Probably because you have been on metformin. You are welcome to continue to take it or you may certainly stop it if it's bothering you, and we'll just keep an eye on your A1c.    Sincerely,  Dr. Gretchen Vargas MD  10/24/2024

## 2024-10-24 NOTE — PROGRESS NOTES
"Preventive Care Visit  [facility]  Gretchen Vargas MD, Family Medicine  Oct 24, 2024    Assessment & Plan     (Z00.00) Routine general medical examination at a health care facility  (primary encounter diagnosis)    (E11.9) Type 2 diabetes mellitus without complication, without long-term current use of insulin (H)  At goal! A1c is lower and BP is good.    (E78.5) HYPERLIPIDEMIA LDL GOAL <130  LDL Cholesterol Calculated   Date Value Ref Range Status   03/08/2024 83 <=100 mg/dL Final   01/08/2021 96 <100 mg/dL Final     Comment:     Desirable:       <100 mg/dl      Plan: Lipid panel reflex to direct LDL Fasting        At goal  The current medical regimen is effective;  continue present plan and medications.       (R09.02) Hypoxia  Comment: oxygen today now normal. Idiopathic onset of hypoxic failure with recent hospialization, cause undetermined, has follow up testing and clinic visit with pulmonologist.  Allergy might be cause, will do testing as below.  Plan: [facility] Resp Allergen Panel        Will fu as indicated.      Patient has been advised of split billing requirements and indicates understanding: Yes        BMI  Estimated body mass index is 38.18 kg/m  as calculated from the following:    Height as of this encounter: 1.778 m (5' 10\").    Weight as of this encounter: 120.7 kg (266 lb 1.6 oz).   Weight management plan: see AVS for lifestyle recommendations    Counseling  Appropriate preventive services were addressed with this patient via screening, questionnaire, or discussion as appropriate for fall prevention, nutrition, physical activity, Tobacco-use cessation, social engagement, weight loss and cognition.  Checklist reviewing preventive services available has been given to the patient.  Reviewed patient's diet, addressing concerns and/or questions.     Subjective   Lesli is a 64 year old, presenting for the following:  Physical and Refill Request (Discuss blood glucose (NO " BRAND SPECIFIED) test strip)        10/24/2024     1:12 PM   Additional Questions   Roomed by LANDEN Orozco   Accompanied by Self       HPI  Hyperlipidemia Follow-Up    Are you regularly taking any medication or supplement to lower your cholesterol?   Yes- lovastatin  Are you having muscle aches or other side effects that you think could be caused by your cholesterol lowering medication?  No     Diabetes Follow-up    How often are you checking your blood sugar? One time daily  What time of day are you checking your blood sugars (select all that apply)?  Before and after meals  Have you had any blood sugars above 200?  No  Have you had any blood sugars below 70?  No  What symptoms do you notice when your blood sugar is low?  None  What concerns do you have today about your diabetes? Other: needs test strips refills   Do you have any of these symptoms? (Select all that apply)  No numbness or tingling in feet.  No redness, sores or blisters on feet.  No complaints of excessive thirst.  No reports of blurry vision.  No significant changes to weight.  Have you had a diabetic eye exam in the last 12 months? No  She's gained some weight in the last month, she hasn't exercised regularly since she was discharged from hospital, was admitted for acute idiopathic hypoxic episode and is still rebuilding her endurance.    BP Readings from Last 2 Encounters:   10/24/24 130/78   09/10/24 126/78     Hemoglobin A1C (%)   Date Value   09/03/2024 7.1 (H)   03/08/2024 7.5 (H)   01/08/2021 6.6 (H)   11/26/2019 6.7 (H)     LDL Cholesterol Calculated (mg/dL)   Date Value   03/08/2024 83   05/05/2023 75   01/08/2021 96   11/26/2019 98     Health Care Directive  Patient does not have a Health Care Directive: Discussed advance care planning with patient; information given to patient to review.      10/23/2024   General Health   How would you rate your overall physical health? Good   Feel stress (tense, anxious, or unable to sleep) Not at all             10/23/2024   Nutrition   Three or more servings of calcium each day? Yes   Diet: Vegetarian/vegan   How many servings of fruit and vegetables per day? 4 or more   How many sweetened beverages each day? 0-1            10/23/2024   Exercise   Days per week of moderate/strenous exercise 0 days   Average minutes spent exercising at this level 10 min      (!) EXERCISE CONCERN      10/23/2024   Social Factors   Frequency of gathering with friends or relatives Once a week   Worry food won't last until get money to buy more No   Food not last or not have enough money for food? No   Do you have housing? (Housing is defined as stable permanent housing and does not include staying ouside in a car, in a tent, in an abandoned building, in an overnight shelter, or couch-surfing.) Yes   Are you worried about losing your housing? No   Lack of transportation? No   Unable to get utilities (heat,electricity)? No            10/23/2024   Fall Risk   Fallen 2 or more times in the past year? No    Trouble with walking or balance? Yes        Patient-reported           10/23/2024   Dental   Dentist two times every year? Yes            10/23/2024   TB Screening   Were you born outside of the [place]? No                  10/23/2024   Substance Use   Alcohol more than 3/day or more than 7/wk No   Do you use any other substances recreationally? No        Social History     Tobacco Use    Smoking status: Former     Current packs/day: 0.00     Average packs/day: 1 pack/day for 20.0 years (20.0 ttl pk-yrs)     Types: Cigarettes     Start date: 1990     Quit date: 2010     Years since quittin.8    Smokeless tobacco: Former    Tobacco comments:     5 cigarettes day or less   Vaping Use    Vaping status: Never Used   Substance Use Topics    Alcohol use: Yes     Comment: 1-2  beer daily on average    Drug use: No           2023   LAST [facility]-7 RESULTS   1st degree relative breast or ovarian cancer No   Any relative bilateral breast  cancer No   Any male have breast cancer No   Any ONE woman have BOTH breast AND ovarian cancer No   Any woman with breast cancer before 50yrs No   2 or more relatives with breast AND/OR ovarian cancer No   2 or more relatives with breast AND/OR bowel cancer No           Mammogram Screening - Mammogram every 1-2 years updated in Health Maintenance based on mutual decision making        10/23/2024   STI Screening   New sexual partner(s) since last STI/HIV test? No        History of abnormal Pap smear: No - age 30- 64 PAP with HPV every 5 years recommended        Latest Ref Rng & Units 1/8/2021    10:31 AM 1/8/2021     9:54 AM 7/8/2016    11:44 AM   PAP / HPV   PAP (Historical)   NIL     HPV 16 DNA NEG^Negative Negative   Negative    HPV 18 DNA NEG^Negative Negative   Negative    Other HR HPV NEG^Negative Negative   Negative      ASCVD Risk   The 10-year ASCVD risk score (Cruz LOCKETT, et al., 2019) is: 12.8%    Values used to calculate the score:      Age: 64 years      Sex: Female      Is Non- : No      Diabetic: Yes      Tobacco smoker: No      Systolic Blood Pressure: 130 mmHg      Is BP treated: Yes      HDL Cholesterol: 44 mg/dL      Total Cholesterol: 163 mg/dL         Reviewed and updated as needed this visit by Provider   Tobacco    Problems  Med Hx  Surg Hx  Fam Hx  Soc Hx Sexual Activity          Past Medical History:   Diagnosis Date    Abnormal vaginal bleeding 11/10/2016    Baker's cyst of knee     Lt (7.3x4.3x1.6 cm)    Elevated glucose     Hyperlipidemia LDL goal <130     Hypertension goal BP (blood pressure) < 140/90     Hypoxia 09/02/2024    Idiopathic edema     NONSPECIFIC MEDICAL HISTORY     nephrolithiasis    Personal history of smoking     quit in 2010    Secondary lymphedema 8/4/19    DX by [facility] [place], lose wt. at this point    Unspecified iridocyclitis      Past Surgical History:   Procedure Laterality Date    COLONOSCOPY  2/16/11    repeat 3 yrs, one  "inconclusive area     OB History    Para Term  AB Living   1 0 0 0 1 0   SAB IAB Ectopic Multiple Live Births   0 1 0 0 0      # Outcome Date GA Lbr Bryn/2nd Weight Sex Type Anes PTL Lv   1 IAB                  Review of Systems  Constitutional, HEENT, cardiovascular, pulmonary, gi and gu systems are negative, except as otherwise noted.     Objective    Exam  Wt Readings from Last 4 Encounters:   10/24/24 120.7 kg (266 lb 1.6 oz)   09/10/24 116.3 kg (256 lb 6.4 oz)   24 115.9 kg (255 lb 8.2 oz)   24 118.9 kg (262 lb 1.6 oz)       /78 (BP Location: Right arm, Patient Position: Sitting, Cuff Size: Adult Large)   Pulse 74   Temp 97.5  F (36.4  C) (Temporal)   Resp 22   Ht 1.778 m (5' 10\")   Wt 120.7 kg (266 lb 1.6 oz)   LMP 2012   SpO2 94%   BMI 38.18 kg/m     Estimated body mass index is 38.18 kg/m  as calculated from the following:    Height as of this encounter: 1.778 m (5' 10\").    Weight as of this encounter: 120.7 kg (266 lb 1.6 oz).    Physical Exam  GENERAL: alert and no distress  EYES: Eyes grossly normal to inspection, PERRL and conjunctivae and sclerae normal  NECK: no adenopathy, no asymmetry, masses, or scars  RESP: lungs clear to auscultation - no rales, rhonchi or wheezes  CV: regular rate and rhythm, normal S1 S2, no S3 or S4, no murmur, click or rub, no peripheral edema  ABDOMEN: soft, nontender, no hepatosplenomegaly, no masses and bowel sounds normal  MS: no gross musculoskeletal defects noted, no edema, non pitting edema of both lower extremities  SKIN: no suspicious lesions or rashes  NEURO: Normal strength and tone, mentation intact and speech normal  PSYCH: mentation appears normal, affect normal/bright        Signed Electronically by: Gretchen Vargas MD    "

## 2024-10-24 NOTE — PATIENT INSTRUCTIONS
[facility]  [address] [place], [place]  [phone]     Dr. Winston Lennon, opthalmologist  [facility]   [address]   [place] [address]   Phone:   [phone]         Patient Education   Preventive Care Advice   This is general advice given by our system to help you stay healthy. However, your care team may have specific advice just for you. Please talk to your care team about your preventive care needs.  Nutrition  Eat 5 or more servings of fruits and vegetables each day.  Try wheat bread, brown rice and whole grain pasta (instead of white bread, rice, and pasta).  Get enough calcium and vitamin D. Check the label on foods and aim for 100% of the RDA (recommended daily allowance).  Lifestyle  Exercise at least 150 minutes each week  (30 minutes a day, 5 days a week).  Do muscle strengthening activities 2 days a week. These help control your weight and prevent disease.  No smoking.  Wear sunscreen to prevent skin cancer.  Have a dental exam and cleaning every 6 months.  Yearly exams  See your health care team every year to talk about:  Any changes in your health.  Any medicines your care team has prescribed.  Preventive care, family planning, and ways to prevent chronic diseases.  Shots (vaccines)   HPV shots (up to age 26), if you've never had them before.  Hepatitis B shots (up to age 59), if you've never had them before.  COVID-19 shot: Get this shot when it's due.  Flu shot: Get a flu shot every year.  Tetanus shot: Get a tetanus shot every 10 years.  Pneumococcal, hepatitis A, and RSV shots: Ask your care team if you need these based on your risk.  Shingles shot (for age 50 and up)  General health tests  Diabetes screening:  Starting at age 35, Get screened for diabetes at least every 3 years.  If you are younger than age 35, ask your care team if you should be screened for diabetes.  Cholesterol test: At age 39, start having a cholesterol  test every 5 years, or more often if advised.  Bone density scan (DEXA): At age 50, ask your care team if you should have this scan for osteoporosis (brittle bones).  Hepatitis C: Get tested at least once in your life.  STIs (sexually transmitted infections)  Before age 24: Ask your care team if you should be screened for STIs.  After age 24: Get screened for STIs if you're at risk. You are at risk for STIs (including HIV) if:  You are sexually active with more than one person.  You don't use condoms every time.  You or a partner was diagnosed with a sexually transmitted infection.  If you are at risk for HIV, ask about PrEP medicine to prevent HIV.  Get tested for HIV at least once in your life, whether you are at risk for HIV or not.  Cancer screening tests  Cervical cancer screening: If you have a cervix, begin getting regular cervical cancer screening tests starting at age 21.  Breast cancer scan (mammogram): If you've ever had breasts, begin having regular mammograms starting at age 40. This is a scan to check for breast cancer.  Colon cancer screening: It is important to start screening for colon cancer at age 45.  Have a colonoscopy test every 10 years (or more often if you're at risk) Or, ask your provider about stool tests like a FIT test every year or Cologuard test every 3 years.  To learn more about your testing options, visit:   .  For help making a decision, visit:   https://bit.ly/wr58467.  Prostate cancer screening test: If you have a prostate, ask your care team if a prostate cancer screening test (PSA) at age 55 is right for you.  Lung cancer screening: If you are a current or former smoker ages 50 to 80, ask your care team if ongoing lung cancer screenings are right for you.  For informational purposes only. Not to replace the advice of your health care provider. Copyright   2023 [employer]. All rights reserved. Clinically reviewed by the [facility].  [employer] 367750 - REV 01/24.  Preventing Falls: Care Instructions  Injuries and health problems such as trouble walking or poor eyesight can increase your risk of falling. So can some medicines. But there are things you can do to help prevent falls. You can exercise to get stronger. You can also arrange your home to make it safer.    Talk to your doctor about the medicines you take. Ask if any of them increase the risk of falls and whether they can be changed or stopped.   Try to exercise regularly. It can help improve your strength and balance. This can help lower your risk of falling.         Practice fall safety and prevention.   Wear low-heeled shoes that fit well and give your feet good support. Talk to your doctor if you have foot problems that make this hard.  Carry a cellphone or wear a medical alert device that you can use to call for help.  Use stepladders instead of chairs to reach high objects. Don't climb if you're at risk for falls. Ask for help, if needed.  Wear the correct eyeglasses, if you need them.        Make your home safer.   Remove rugs, cords, clutter, and furniture from walkways.  Keep your house well lit. Use night-lights in hallways and bathrooms.  Install and use sturdy handrails on stairways.  Wear nonskid footwear, even inside. Don't walk barefoot or in socks without shoes.        Be safe outside.   Use handrails, curb cuts, and ramps whenever possible.  Keep your hands free by using a shoulder bag or backpack.  Try to walk in well-lit areas. Watch out for uneven ground, changes in pavement, and debris.  Be careful in the winter. Walk on the grass or gravel when sidewalks are slippery. Use de-icer on steps and walkways. Add non-slip devices to shoes.    Put grab bars and nonskid mats in your shower or tub and near the toilet. Try to use a shower chair or bath bench when bathing.   Get into a tub or shower by putting in your weaker leg first. Get out with your strong side first. Have  "a phone or medical alert device in the bathroom with you.   Where can you learn more?  Go to https://www.[employer].net/patiented  Enter G117 in the search box to learn more about \"Preventing Falls: Care Instructions.\"  Current as of: July 17, 2023  Content Version: 14.2 2024 [facility] [employer] [facility].   Care instructions adapted under license by your healthcare professional. If you have questions about a medical condition or this instruction, always ask your healthcare professional. [employer] disclaims any warranty or liability for your use of this information.       "

## 2024-10-24 NOTE — TELEPHONE ENCOUNTER
I sent the following [employer] message, nothing further to do, encounter closed.    Sincerely,  Dr. Gretchen Vargas MD  10/24/2024 1:01 PM      Hi, I'm sorry I just saw the note today. You do NOT need to fast for your appointment today. You are caught up for your labs. I'll see you soon!    Sincerely,  Dr. Gretchen Vargas MD  10/24/2024      Health Maintenance Due   Topic Date Due    SPIROMETRY  Never done    COPD ACTION PLAN  Never done    RSV VACCINE (1 - Risk 60-74 years 1-dose series) Never done    EYE EXAM  09/01/2022    COLORECTAL CANCER SCREENING  03/24/2024    YEARLY PREVENTIVE VISIT  05/05/2024    DIABETIC FOOT EXAM  05/05/2024    INFLUENZA VACCINE (1) 09/01/2024    COVID-19 Vaccine (7 - 2024-25 season) 09/01/2024    Impaired fasting glucose Follow-up    Patient is checking blood sugars: not at all  Diabetic concerns: None   Symptoms of hypoglycemia (low blood sugar): none   Paresthesias (numbness or burning in feet) or sores: No    Lab Results   Component Value Date    A1C 7.1 09/03/2024    A1C 7.5 03/08/2024    A1C 7.2 05/05/2023    A1C 6.9 03/10/2022    A1C 6.6 01/08/2021    A1C 6.7 11/26/2019    A1C 7.7 11/16/2018    A1C 7.7 09/08/2017    A1C 8.2 06/08/2017

## 2024-10-25 LAB
ANION GAP SERPL CALCULATED.3IONS-SCNC: 14 MMOL/L (ref 7–15)
BUN SERPL-MCNC: 10.6 MG/DL (ref 8–23)
CALCIUM SERPL-MCNC: 9.6 MG/DL (ref 8.8–10.4)
CHLORIDE SERPL-SCNC: 97 MMOL/L (ref 98–107)
CHOLEST SERPL-MCNC: 161 MG/DL
CREAT SERPL-MCNC: 0.72 MG/DL (ref 0.51–0.95)
CREAT UR-MCNC: 199 MG/DL
EGFRCR SERPLBLD CKD-EPI 2021: >90 ML/MIN/1.73M2
FASTING STATUS PATIENT QL REPORTED: YES
FASTING STATUS PATIENT QL REPORTED: YES
GLUCOSE SERPL-MCNC: 110 MG/DL (ref 70–99)
HCO3 SERPL-SCNC: 29 MMOL/L (ref 22–29)
HDLC SERPL-MCNC: 55 MG/DL
LDLC SERPL CALC-MCNC: 70 MG/DL
MICROALBUMIN UR-MCNC: 26.3 MG/L
MICROALBUMIN/CREAT UR: 13.22 MG/G CR (ref 0–25)
NONHDLC SERPL-MCNC: 106 MG/DL
POTASSIUM SERPL-SCNC: 3.9 MMOL/L (ref 3.4–5.3)
SODIUM SERPL-SCNC: 140 MMOL/L (ref 135–145)
TRIGL SERPL-MCNC: 181 MG/DL

## 2024-10-27 LAB
A ALTERNATA IGE QN: <0.1 KU(A)/L
A FUMIGATUS IGE QN: <0.1 KU(A)/L
BERMUDA GRASS IGE QN: 0.13 KU(A)/L
C HERBARUM IGE QN: <0.1 KU(A)/L
CAT DANDER IGG QN: 0.76 KU(A)/L
CEDAR IGE QN: 0.65 KU(A)/L
COMMON RAGWEED IGE QN: 1.07 KU(A)/L
COTTONWOOD IGE QN: 0.37 KU(A)/L
D FARINAE IGE QN: 0.34 KU(A)/L
D PTERONYSS IGE QN: 0.46 KU(A)/L
DOG DANDER+EPITH IGE QN: 0.32 KU(A)/L
IGE SERPL-ACNC: 1248 KU/L (ref 0–114)
MAPLE IGE QN: <0.1 KU(A)/L
MARSH ELDER IGE QN: 0.35 KU(A)/L
MOUSE URINE PROT IGE QN: <0.1 KU(A)/L
NETTLE IGE QN: 0.28 KU(A)/L
P NOTATUM IGE QN: <0.1 KU(A)/L
ROACH IGE QN: 0.31 KU(A)/L
SALTWORT IGE QN: 0.16 KU(A)/L
SILVER BIRCH IGE QN: <0.1 KU(A)/L
TIMOTHY IGE QN: 0.2 KU(A)/L
WHITE ASH IGE QN: 0.14 KU(A)/L
WHITE ELM IGE QN: 0.26 KU(A)/L
WHITE MULBERRY IGE QN: 0.12 KU(A)/L
WHITE OAK IGE QN: 0.24 KU(A)/L

## 2024-10-29 NOTE — RESULT ENCOUNTER NOTE
The rest of your labs are back!    You do have a number of environmental allergies. This includes dust mites, cock roaches, cat and dog dander, grasses and trees. This will probably result in year round symptoms. You can certainly take a daily over the counter antihistamine. Please let me know if you'd like a referral to an allergist.     I included some information on reducing dust mites below.    Sincerely,  Dr. Gretchen Vargas MD  10/29/2024      Dust mites are microscopic creatures, related to ticks and spiders, that live in house dust. The proteins in dust mite body parts and feces cause allergic reactions in people who have become sensitized to dust mite proteins.    Dust mite allergen also triggers asthma attacks and is one of the most common causes of asthma attacks worldwide. This is primarily because dust mites are found nearly everywhere, especially indoors. Dust mite allergen can also be inhaled while it is airborne, though it tends to settle quickly.    All homes contain some amount of dust mites. Because dust mites feed on dead human skin, the allergen tends to be concentrated in mattresses, bedding, upholstered furniture and carpets.    Symptoms can include:   Typical allergy or hay fever symptoms, such as sneezing, runny nose or congestion, sore throat, sinus pain, itchy or watery, red eyes, headache.   Asthma symptoms, such as wheezing, coughing, difficulty breathing, tightness in the chest.   Eczema or skin rashes    Take these steps to minimize exposure to dust mites:  1. Eliminate or encase dust mite jena:  Remove Carpets: Ueft-ou-zvni carpeting and padding are a significant dust mite traps, which release the allergen when disturbed by foot traffic and other movements on them. Hardwood, tile and linoleum are much safer options. If you can't live without carpet, choose natural fiber area rugs that can be cleaned easily.   Encase mattresses in dust mite-proof covers. Fabric encasements should be  washed in very hot water (130 degrees Fahrenheit), while plastic encasements should be washed or damp sponged, every two weeks.   Replace pillows filled with feathers, down or foam with synthetic fillings such as Dacron.   Keep stuffed toys off beds.    2. Keep dust mite exposure low by thoroughly cleaning, especially in homes with young babies.  Wash your sheets, blankets, mattress pads and pillowcases in very hot water (130 degrees F) every week. Curtains should also be washed regularly, though not necessarily as often as bedding.   Limit the number of stuffed animals, and wash them once a month in hot water (130 degrees F), and dry thoroughly in a hot dryer for 20 minutes.     Vacuum carpets and upholstered furniture at least twice weekly. Substitute multi-layered vacuum bags for regular single layer bags, and use a vacuum with a high-efficiency, or HEPA, filter. Do not use a bagless vacuum, as dust mite allergen will be released when the collection container is removed for emptying.   Damp-wipe mynor surfaces and mop floors weekly, especially in homes with small children, who spend much of their time on the floor.     Avoid steam-cleaning bedding, upholstery and curtains. While the steam will kill mites, it fosters future mite growth by increasing humidity in the fabrics.     3. Ventilate your home.    Dehumidify: Reduce indoor humidity to 30-50% by using an air conditioner or dehumidifier. Don't put an aquarium, humidifier or other source of water in your bedroom.   Clean or replace filters on furnaces and air conditioners regularly, as recommended by the . Install filters on air ducts, if appropriate.     Use an air purifier only as a last resort. These devices can help somewhat, but are ineffective if other measures have not been taken. Since dust mite allergen dust does not remain airborne for long, air purifiers are unlikely to remove much dust from the room.     NOTE: Avoid using anti-dust-mite  carpet treatments that contain tannic acid or benzyl benzoate, both of which are skin, eye and respiratory irritants that can make asthma symptoms worse.

## 2024-11-07 NOTE — CONFIDENTIAL NOTE
RECORDS RECEIVED FROM: internal    DATE RECEIVED: 11.27.24    NOTES STATUS DETAILS   OFFICE NOTE from referring provider internal    Mahsa Lawrence MD      MEDICATION LIST internal     IMAGING  (NEED IMAGES AND REPORTS)     CT SCAN internal  9.3.24    CHEST XRAY (CXR) internal  9.2.24    TESTS     PULMONARY FUNCTION TESTING (PFT) internal  Scheduled 11.27.24

## 2024-11-13 DIAGNOSIS — E78.5 HYPERLIPIDEMIA LDL GOAL <130: ICD-10-CM

## 2024-11-13 RX ORDER — LOVASTATIN 20 MG/1
10 TABLET ORAL AT BEDTIME
Qty: 45 TABLET | Refills: 0 | Status: SHIPPED | OUTPATIENT
Start: 2024-11-13

## 2024-11-27 ENCOUNTER — OFFICE VISIT (OUTPATIENT)
Dept: PULMONOLOGY | Facility: CLINIC | Age: 64
End: 2024-11-27
Attending: STUDENT IN AN ORGANIZED HEALTH CARE EDUCATION/TRAINING PROGRAM
Payer: COMMERCIAL

## 2024-11-27 ENCOUNTER — OFFICE VISIT (OUTPATIENT)
Dept: PULMONOLOGY | Facility: CLINIC | Age: 64
End: 2024-11-27
Attending: INTERNAL MEDICINE
Payer: COMMERCIAL

## 2024-11-27 ENCOUNTER — PRE VISIT (OUTPATIENT)
Dept: PULMONOLOGY | Facility: CLINIC | Age: 64
End: 2024-11-27

## 2024-11-27 VITALS
WEIGHT: 263.7 LBS | OXYGEN SATURATION: 95 % | BODY MASS INDEX: 37.75 KG/M2 | DIASTOLIC BLOOD PRESSURE: 91 MMHG | SYSTOLIC BLOOD PRESSURE: 164 MMHG | HEART RATE: 70 BPM | HEIGHT: 70 IN

## 2024-11-27 DIAGNOSIS — J44.9 CHRONIC OBSTRUCTIVE PULMONARY DISEASE, UNSPECIFIED COPD TYPE (H): Primary | ICD-10-CM

## 2024-11-27 DIAGNOSIS — J44.1 COPD EXACERBATION (H): ICD-10-CM

## 2024-11-27 PROCEDURE — 99213 OFFICE O/P EST LOW 20 MIN: CPT | Performed by: STUDENT IN AN ORGANIZED HEALTH CARE EDUCATION/TRAINING PROGRAM

## 2024-11-27 RX ORDER — BUDESONIDE, GLYCOPYRROLATE, AND FORMOTEROL FUMARATE 160; 9; 4.8 UG/1; UG/1; UG/1
2 AEROSOL, METERED RESPIRATORY (INHALATION) 2 TIMES DAILY
Qty: 5.9 G | Refills: 11 | Status: SHIPPED | OUTPATIENT
Start: 2024-11-27

## 2024-11-27 ASSESSMENT — PAIN SCALES - GENERAL: PAINLEVEL_OUTOF10: NO PAIN (0)

## 2024-11-27 NOTE — LETTER
2024      Lesli Jackson  [address]  [facility] [address]      Dear Colleague,    Thank you for referring your patient, Lesli Jackson, to the [facility] [place]. Please see a copy of my visit note below.    [facility]    NAME: Lesli Jackson  AGE: 64 year old  MRN: [id number]  : 1960    REASON FOR VISIT  Query COPD     HPI  Lesli Jackson is a 64 year old female with history of tobacco use (40PY, quit in 2024) with recent hospitalization for acute hypoxic respiratory failure (2024) presenting today for evaluation of possible COPD. She is overall feeling very well since her admission. Preceding her admission, she noted that she was exposed to a number of allergens and wonders if this triggered the admission. She was discharged with albuterol, which she keeps on her person but doesn't use regularly. She had allergy testing with her primary care provider recently and was noted to have a number of allergens.    On pulmonary review of symptoms: has historically smoked between 0.5-1 PPD since her 20s - approximately 20 pack years. Did notice significant wheezing when she smoked and has found the absence of wheezing quite notable since quitting! No sputum production. Minimal cough. No hemoptysis. No known exposures to pneumotoxins or individuals with active tuberculosis. Her partner does smoke cannabis products, but outside the house. 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     10 point ROS performed and negative except for as noted in HPI.    SOCIAL HISTORY  Social History     Socioeconomic History     Marital status: Single     Spouse name: Not on file     Number of children: Not on file     Years of education: 16     Highest education level: Not on file   Occupational History     Occupation:      Employer: [employer]   Tobacco Use     Smoking status: Former     Current packs/day: 0.00      Average packs/day: 1 pack/day for 20.0 years (20.0 ttl pk-yrs)     Types: Cigarettes     Start date: 1990     Quit date: 2010     Years since quittin.9     Smokeless tobacco: Former     Tobacco comments:     5 cigarettes day or less   Vaping Use     Vaping status: Never Used   Substance and Sexual Activity     Alcohol use: Yes     Comment: 1-2  beer daily on average     Drug use: No     Sexual activity: Yes     Partners: Male   Other Topics Concern     Parent/sibling w/ CABG, MI or angioplasty before 65F 55M? Yes   Social History Narrative    Balanced Diet - Yes    Osteoporosis Prevention Measures - Dairy servings per day: 1-2    Regular Exercise -  Yes Describe does a little walking everyday    Dental Exam - NO    Eye Exam - NO    Self Breast Exam - Yes    Abuse: Current or Past (Physical, Sexual or Emotional)- No    Do you feel safe in your environment - Yes    Guns stored in the home - No    Sunscreen used - Yes    Seatbelts used - Yes    Lipids -  YES - Date:     Glucose -  YES - Date:     Colon Cancer Screening - No    Hemoccults - NO    Pap Test -  YES - Date: 246925    Do you have any concerns about STD's -  No    Mammography - NO    DEXA - NO    Immunizations reviewed and up to date - Yes, last td given 550035     Social Drivers of Health     Financial Resource Strain: Low Risk  (10/23/2024)    Financial Resource Strain      Within the past 12 months, have you or your family members you live with been unable to get utilities (heat, electricity) when it was really needed?: No   Food Insecurity: Low Risk  (10/23/2024)    Food Insecurity      Within the past 12 months, did you worry that your food would run out before you got money to buy more?: No      Within the past 12 months, did the food you bought just not last and you didn t have money to get more?: No   Transportation Needs: Low Risk  (10/23/2024)    Transportation Needs      Within the past 12 months, has lack of transportation  kept you from medical appointments, getting your medicines, non-medical meetings or appointments, work, or from getting things that you need?: No   Physical Activity: Inactive (10/23/2024)    Exercise Vital Sign      Days of Exercise per Week: 0 days      Minutes of Exercise per Session: 10 min   Stress: No Stress Concern Present (10/23/2024)    Citizen of [place] of Occupational Health - Occupational Stress Questionnaire      Feeling of Stress : Not at all   Social Connections: Unknown (10/23/2024)    Social Connection and Isolation Panel [NHANES]      Frequency of Communication with Friends and Family: Not on file      Frequency of Social Gatherings with Friends and Family: Once a week      Attends Yarsanism Services: Not on file      Active Member of Clubs or Organizations: Not on file      Attends Club or Organization Meetings: Not on file      Marital Status: Not on file   Interpersonal Safety: Low Risk  (10/24/2024)    Interpersonal Safety      Do you feel physically and emotionally safe where you currently live?: Yes      Within the past 12 months, have you been hit, slapped, kicked or otherwise physically hurt by someone?: No      Within the past 12 months, have you been humiliated or emotionally abused in other ways by your partner or ex-partner?: No   Housing Stability: Low Risk  (10/23/2024)    Housing Stability      Do you have housing? : Yes      Are you worried about losing your housing?: No     PAST MEDICAL HISTORY  Past Medical History:   Diagnosis Date     Abnormal vaginal bleeding 11/10/2016     Baker's cyst of knee     Lt (7.3x4.3x1.6 cm)     Elevated glucose      Hyperlipidemia LDL goal <130      Hypertension goal BP (blood pressure) < 140/90      Hypoxia 09/02/2024     Idiopathic edema      NONSPECIFIC MEDICAL HISTORY     nephrolithiasis     Personal history of smoking     quit in 2010     Secondary lymphedema 8/4/19    DX by [facility] [place], lose wt. at this point     Unspecified  iridocyclitis       PAST SURGICAL HISTORY     Past Surgical History:   Procedure Laterality Date     COLONOSCOPY  2/16/11    repeat 3 yrs, one inconclusive area      FAMILY HISTORY  Family History   Problem Relation Age of Onset     C.A.D. Father      Diabetes Father      Cerebrovascular Disease Father         stroke     Eye Disorder Father         cataracts     Heart Disease Father      Hypertension Mother      Arthritis Mother      Cancer Mother         lymphoma     Abdominal Aortic Aneurysm Brother 63        nonsmoker     Prostate Cancer Maternal Uncle      Allergies Sister      Eye Disorder Other         iritis     Lipids Other         one of her parents     Thyroid Disease Sister       ALLERGIES  Allergies   Allergen Reactions     Dust Mites      Amoxicillin Rash      MEDICATIONS  Current Outpatient Medications   Medication Sig Dispense Refill     carvedilol (COREG) 6.25 MG tablet Take 1 tablet (6.25 mg) by mouth 2 times daily (with meals) for 360 days 60 tablet 11     Flaxseed, Linseed, 1000 MG CAPS Take 6 capsules by mouth daily.       hydrochlorothiazide (HYDRODIURIL) 50 MG tablet Take 0.5 tablets (25 mg) by mouth daily 45 tablet 3     lisinopril (ZESTRIL) 40 MG tablet Take 1.5 tablets (60 mg) by mouth daily 45 tablet 11     methylcellulose (CITRUCEL) powder Take 0.85 g (3 teaspoonful) by mouth daily       MULTIPLE VIT-MIN-CALCIUM-FA PO Take 1 tablet by mouth daily.       albuterol (PROAIR HFA/PROVENTIL HFA/VENTOLIN HFA) 108 (90 Base) MCG/ACT inhaler Inhale 2 puffs into the lungs every 6 hours as needed for shortness of breath, wheezing or cough. (Patient not taking: Reported on 11/27/2024) 18 g 0     bisacodyl (DULCOLAX) 5 MG EC tablet Take 2 tablets at 3 pm the day before your procedure. If your procedure is before 11 am, take 2 additional tablets at 11 pm. If your procedure is after 11 am, take 2 additional tablets at 6 am. For additional instructions refer to your colonoscopy prep instructions. (Patient  "not taking: Reported on 11/27/2024) 4 tablet 0     blood glucose (NO BRAND SPECIFIED) test strip Use to test blood sugar once daily 100 strip 6     blood glucose monitoring (NO BRAND SPECIFIED) meter device kit Use to test blood sugar once daily 1 kit 0     lovastatin (MEVACOR) 20 MG tablet TAKE 1/2 TABLET BY MOUTH EVERY NIGHT AT BEDTIME 45 tablet 0     metFORMIN (GLUCOPHAGE) 500 MG tablet Take 1 tablet (500 mg) by mouth 2 times daily (with meals). 180 tablet 0     polyethylene glycol (GOLYTELY) 236 g suspension The night before the exam at 6 pm drink an 8-ounce glass every 15 minutes until the jug is half empty. If you arrive before 11 AM: Drink the other half of the Golytely jug at 11 PM night before procedure. If you arrive after 11 AM: Drink the other half of the Golytely jug at 6 AM day of procedure. For additional instructions refer to your colonoscopy prep instructions. (Patient not taking: Reported on 11/27/2024) 4000 mL 0     sodium chloride (OCEAN) 0.65 % nasal spray As needed for dryness (Patient not taking: Reported on 11/27/2024) 30 mL 0     thin (NO BRAND SPECIFIED) lancets Use with lanceting device. 100 each 0     No current facility-administered medications for this visit.     EXAM  Ht 1.778 m (5' 10\")   Wt 119.6 kg (263 lb 11.2 oz)   LMP 12/22/2012   BMI 37.84 kg/m      Gen: sitting upright, in no distress  HEENT: atraumatic, EOMI  Oropharynx: clear  CV: RRR, no peripheral edema noted  Resp: no increased WOB, good air movement on anterior and posterior auscultation without rhonchi or wheezes  Abd: not distended, non-tender  Skin: no rashes or lesions on exposed skin  Extremities: moving all extremities, no gross deformities  Neuro: alert and oriented, gait steady    DATA  Hgb: 13.1    Eos 0.5 in 2016    CT 9/2024  1.  No pulmonary embolism.  2.  Mild inflammatory central bronchial wall thickening. No consolidations or pleural effusions.  3.  Mild upper lobe predominant centrilobular " emphysema.  4.  Moderate coronary arterial calcifications.     PFT 11/27/2024      ECHO 9/2024  The left ventricle is normal in size.  The visual ejection fraction is 60-65%.  Diastolic Doppler findings (E/E' ratio and/or other parameters) suggest left  ventricular filling pressures are increased.  No regional wall motion abnormalities noted.  There is mild mitral stenosis.  There is sclerosis, calcification, and restriction of the aortic valve opening  compatible with trivial aortic stenosis.    ASSESSMENT AND RECOMMENDATIONS  Lesli Jackson is a 64 year old female with history of tobacco use (40PY, quit in 9/2024) with recent hospitalization for acute hypoxic respiratory failure (9/2024) presenting for evaluation of possible COPD. 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 I do also wonder about the possibility of diastolic dysfunction with volume overload contributing to her initial hospital presentation (note elevated filling pressures on TTE during her 9/2024 admission). Though her NT-proBNP was within normal limits, could be falsely low with her BMI. We discussed initiation of inhaler therapy today and she's amenable; starting Breztri 2 puffs BID. RTC 3 months with PFTs.    Staffed with Dr. Alejandra. Return to clinic in 3 months.    Kateryna Unger MD PGY5  Pulmonary and Critical Care      Attestation signed by Terell Alejandra MD at 11/28/2024 12:58 PM:  Physician Attestation  ITerell MD, saw this patient and agree with the findings and plan of care as documented in the note.      Items personally reviewed/procedural attestation: vitals, labs, imaging and agree with the interpretation documented in the note, and spirometry report and agree with the  interpretation documented in the note.    Possible asthma. T2 high.      Continue ICS daily  Needs to follow up in 3 months with spirometry.      Terell Alejandra MD       Again, thank you for allowing me to participate in the care of your patient.        Sincerely,        Kateryna Unger MD

## 2024-11-27 NOTE — PROGRESS NOTES
[facility]    NAME: Lesli Jackson  AGE: 64 year old  MRN: [id number]  : 1960    REASON FOR VISIT  Query COPD     HPI  Lesli Jackson is a 64 year old female with history of tobacco use (40PY, quit in 2024) with recent hospitalization for acute hypoxic respiratory failure (2024) presenting today for evaluation of possible COPD. She is overall feeling very well since her admission. Preceding her admission, she noted that she was exposed to a number of allergens and wonders if this triggered the admission. She was discharged with albuterol, which she keeps on her person but doesn't use regularly. She had allergy testing with her primary care provider recently and was noted to have a number of allergens.    On pulmonary review of symptoms: has historically smoked between 0.5-1 PPD since her 20s - approximately 20 pack years. Did notice significant wheezing when she smoked and has found the absence of wheezing quite notable since quitting! No sputum production. Minimal cough. No hemoptysis. No known exposures to pneumotoxins or individuals with active tuberculosis. Her partner does smoke cannabis products, but outside the house. 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     10 point ROS performed and negative except for as noted in HPI.    SOCIAL HISTORY  Social History     Socioeconomic History    Marital status: Single     Spouse name: Not on file    Number of children: Not on file    Years of education: 16    Highest education level: Not on file   Occupational History    Occupation:      Employer: [employer]   Tobacco Use    Smoking status: Former     Current packs/day: 0.00     Average packs/day: 1 pack/day for 20.0 years (20.0 ttl pk-yrs)     Types: Cigarettes     Start date: 1990     Quit date: 2010     Years since quittin.9    Smokeless tobacco: Former    Tobacco comments:     5 cigarettes day or less   Vaping Use    Vaping status:  Never Used   Substance and Sexual Activity    Alcohol use: Yes     Comment: 1-2  beer daily on average    Drug use: No    Sexual activity: Yes     Partners: Male   Other Topics Concern    Parent/sibling w/ CABG, MI or angioplasty before 65F 55M? Yes   Social History Narrative    Balanced Diet - Yes    Osteoporosis Prevention Measures - Dairy servings per day: 1-2    Regular Exercise -  Yes Describe does a little walking everyday    Dental Exam - NO    Eye Exam - NO    Self Breast Exam - Yes    Abuse: Current or Past (Physical, Sexual or Emotional)- No    Do you feel safe in your environment - Yes    Guns stored in the home - No    Sunscreen used - Yes    Seatbelts used - Yes    Lipids -  YES - Date: 040405    Glucose -  YES - Date: 040405    Colon Cancer Screening - No    Hemoccults - NO    Pap Test -  YES - Date: 349889    Do you have any concerns about STD's -  No    Mammography - NO    DEXA - NO    Immunizations reviewed and up to date - Yes, last td given 127834     Social Drivers of Health     Financial Resource Strain: Low Risk  (10/23/2024)    Financial Resource Strain     Within the past 12 months, have you or your family members you live with been unable to get utilities (heat, electricity) when it was really needed?: No   Food Insecurity: Low Risk  (10/23/2024)    Food Insecurity     Within the past 12 months, did you worry that your food would run out before you got money to buy more?: No     Within the past 12 months, did the food you bought just not last and you didn t have money to get more?: No   Transportation Needs: Low Risk  (10/23/2024)    Transportation Needs     Within the past 12 months, has lack of transportation kept you from medical appointments, getting your medicines, non-medical meetings or appointments, work, or from getting things that you need?: No   Physical Activity: Inactive (10/23/2024)    Exercise Vital Sign     Days of Exercise per Week: 0 days     Minutes of Exercise per  Session: 10 min   Stress: No Stress Concern Present (10/23/2024)    [place] of Occupational Health - Occupational Stress Questionnaire     Feeling of Stress : Not at all   Social Connections: Unknown (10/23/2024)    Social Connection and Isolation Panel [NHANES]     Frequency of Communication with Friends and Family: Not on file     Frequency of Social Gatherings with Friends and Family: Once a week     Attends [employer] Services: Not on file     Active Member of Clubs or Organizations: Not on file     Attends Club or Organization Meetings: Not on file     Marital Status: Not on file   Interpersonal Safety: Low Risk  (10/24/2024)    Interpersonal Safety     Do you feel physically and emotionally safe where you currently live?: Yes     Within the past 12 months, have you been hit, slapped, kicked or otherwise physically hurt by someone?: No     Within the past 12 months, have you been humiliated or emotionally abused in other ways by your partner or ex-partner?: No   Housing Stability: Low Risk  (10/23/2024)    Housing Stability     Do you have housing? : Yes     Are you worried about losing your housing?: No     PAST MEDICAL HISTORY  Past Medical History:   Diagnosis Date    Abnormal vaginal bleeding 11/10/2016    Baker's cyst of knee     Lt (7.3x4.3x1.6 cm)    Elevated glucose     Hyperlipidemia LDL goal <130     Hypertension goal BP (blood pressure) < 140/90     Hypoxia 09/02/2024    Idiopathic edema     NONSPECIFIC MEDICAL HISTORY     nephrolithiasis    Personal history of smoking     quit in 2010    Secondary lymphedema 8/4/19    DX by [facility] [place], lose wt. at this point    Unspecified iridocyclitis       PAST SURGICAL HISTORY     Past Surgical History:   Procedure Laterality Date    COLONOSCOPY  2/16/11    repeat 3 yrs, one inconclusive area      FAMILY HISTORY  Family History   Problem Relation Age of Onset    C.A.D. Father     Diabetes Father     Cerebrovascular Disease Father         stroke     Eye Disorder Father         cataracts    Heart Disease Father     Hypertension Mother     Arthritis Mother     Cancer Mother         lymphoma    Abdominal Aortic Aneurysm Brother 63        nonsmoker    Prostate Cancer Maternal Uncle     Allergies Sister     Eye Disorder Other         iritis    Lipids Other         one of her parents    Thyroid Disease Sister       ALLERGIES  Allergies   Allergen Reactions    Dust Mites     Amoxicillin Rash      MEDICATIONS  Current Outpatient Medications   Medication Sig Dispense Refill    carvedilol (COREG) 6.25 MG tablet Take 1 tablet (6.25 mg) by mouth 2 times daily (with meals) for 360 days 60 tablet 11    Flaxseed, Linseed, 1000 MG CAPS Take 6 capsules by mouth daily.      hydrochlorothiazide (HYDRODIURIL) 50 MG tablet Take 0.5 tablets (25 mg) by mouth daily 45 tablet 3    lisinopril (ZESTRIL) 40 MG tablet Take 1.5 tablets (60 mg) by mouth daily 45 tablet 11    methylcellulose (CITRUCEL) powder Take 0.85 g (3 teaspoonful) by mouth daily      MULTIPLE VIT-MIN-CALCIUM-FA PO Take 1 tablet by mouth daily.      albuterol (PROAIR HFA/PROVENTIL HFA/VENTOLIN HFA) 108 (90 Base) MCG/ACT inhaler Inhale 2 puffs into the lungs every 6 hours as needed for shortness of breath, wheezing or cough. (Patient not taking: Reported on 11/27/2024) 18 g 0    bisacodyl (DULCOLAX) 5 MG EC tablet Take 2 tablets at 3 pm the day before your procedure. If your procedure is before 11 am, take 2 additional tablets at 11 pm. If your procedure is after 11 am, take 2 additional tablets at 6 am. For additional instructions refer to your colonoscopy prep instructions. (Patient not taking: Reported on 11/27/2024) 4 tablet 0    blood glucose (NO BRAND SPECIFIED) test strip Use to test blood sugar once daily 100 strip 6    blood glucose monitoring (NO BRAND SPECIFIED) meter device kit Use to test blood sugar once daily 1 kit 0    lovastatin (MEVACOR) 20 MG tablet TAKE 1/2 TABLET BY MOUTH EVERY NIGHT AT BEDTIME 45  "tablet 0    metFORMIN (GLUCOPHAGE) 500 MG tablet Take 1 tablet (500 mg) by mouth 2 times daily (with meals). 180 tablet 0    polyethylene glycol (GOLYTELY) 236 g suspension The night before the exam at 6 pm drink an 8-ounce glass every 15 minutes until the jug is half empty. If you arrive before 11 AM: Drink the other half of the Golytely jug at 11 PM night before procedure. If you arrive after 11 AM: Drink the other half of the Golytely jug at 6 AM day of procedure. For additional instructions refer to your colonoscopy prep instructions. (Patient not taking: Reported on 11/27/2024) 4000 mL 0    sodium chloride (OCEAN) 0.65 % nasal spray As needed for dryness (Patient not taking: Reported on 11/27/2024) 30 mL 0    thin (NO BRAND SPECIFIED) lancets Use with lanceting device. 100 each 0     No current facility-administered medications for this visit.     EXAM  Ht 1.778 m (5' 10\")   Wt 119.6 kg (263 lb 11.2 oz)   LMP 12/22/2012   BMI 37.84 kg/m      Gen: sitting upright, in no distress  HEENT: atraumatic, EOMI  Oropharynx: clear  CV: RRR, no peripheral edema noted  Resp: no increased WOB, good air movement on anterior and posterior auscultation without rhonchi or wheezes  Abd: not distended, non-tender  Skin: no rashes or lesions on exposed skin  Extremities: moving all extremities, no gross deformities  Neuro: alert and oriented, gait steady    DATA  Hgb: 13.1    Eos 0.5 in 2016    CT 9/2024  1.  No pulmonary embolism.  2.  Mild inflammatory central bronchial wall thickening. No consolidations or pleural effusions.  3.  Mild upper lobe predominant centrilobular emphysema.  4.  Moderate coronary arterial calcifications.     PFT 11/27/2024      ECHO 9/2024  The left ventricle is normal in size.  The visual ejection fraction is 60-65%.  Diastolic Doppler findings (E/E' ratio and/or other parameters) suggest left  ventricular filling pressures are increased.  No regional wall motion abnormalities noted.  There is mild " mitral stenosis.  There is sclerosis, calcification, and restriction of the aortic valve opening  compatible with trivial aortic stenosis.    ASSESSMENT AND RECOMMENDATIONS  Lesli Jackson is a 64 year old female with history of tobacco use (40PY, quit in 9/2024) with recent hospitalization for acute hypoxic respiratory failure (9/2024) presenting for evaluation of possible COPD. 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 I do also wonder about the possibility of diastolic dysfunction with volume overload contributing to her initial hospital presentation (note elevated filling pressures on TTE during her 9/2024 admission). Though her NT-proBNP was within normal limits, could be falsely low with her BMI. We discussed initiation of inhaler therapy today and she's amenable; starting Breztri 2 puffs BID. RTC 3 months with PFTs.    Staffed with Dr. Alejandra. Return to clinic in 3 months.    Kateryna Unger MD PGY5  Pulmonary and Critical Care

## 2024-11-27 NOTE — PATIENT INSTRUCTIONS
Lesli-    Stacy to meet you today! Please start taking Breztri 2 puffs twice daily. If the inhaler is not affordable, please give our clinic a call and we can come up with alternatives. See you back in 3 months with repeat breathing tests.    Happy Thanksgiving!

## 2024-11-27 NOTE — NURSING NOTE
Chief Complaint   Patient presents with    New Patient     New Pulmonary        Vitals were taken, medications reconciled.    Shilo Ren, Clinic Assistant   3:23 PM

## 2024-11-28 LAB
DLCOUNC-%PRED-PRE: 103 %
DLCOUNC-PRE: 23.2 ML/MIN/MMHG
DLCOUNC-PRED: 22.45 ML/MIN/MMHG
ERV-%PRED-PRE: 46 %
ERV-PRE: 0.61 L
ERV-PRED: 1.3 L
EXPTIME-PRE: 4.9 SEC
FEF2575-%PRED-POST: 96 %
FEF2575-%PRED-PRE: 55 %
FEF2575-POST: 2.17 L/SEC
FEF2575-PRE: 1.25 L/SEC
FEF2575-PRED: 2.25 L/SEC
FEFMAX-%PRED-PRE: 69 %
FEFMAX-PRE: 4.88 L/SEC
FEFMAX-PRED: 7.01 L/SEC
FEV1-%PRED-PRE: 64 %
FEV1-PRE: 1.72 L
FEV1FEV6-PRE: 73 %
FEV1FEV6-PRED: 80 %
FEV1FVC-PRE: 73 %
FEV1FVC-PRED: 79 %
FEV1SVC-PRE: 67 %
FEV1SVC-PRED: 70 %
FIFMAX-PRE: 3.75 L/SEC
FRCPLETH-%PRED-PRE: 137 %
FRCPLETH-PRE: 4.19 L
FRCPLETH-PRED: 3.05 L
FVC-%PRED-PRE: 69 %
FVC-PRE: 2.36 L
FVC-PRED: 3.42 L
IC-%PRED-PRE: 74 %
IC-PRE: 1.94 L
IC-PRED: 2.61 L
RVPLETH-%PRED-PRE: 159 %
RVPLETH-PRE: 3.58 L
RVPLETH-PRED: 2.25 L
TLCPLETH-%PRED-PRE: 103 %
TLCPLETH-PRE: 6.13 L
TLCPLETH-PRED: 5.94 L
VA-%PRED-PRE: 86 %
VA-PRE: 4.87 L
VC-%PRED-PRE: 66 %
VC-PRE: 2.55 L
VC-PRED: 3.82 L

## 2024-12-02 ENCOUNTER — OFFICE VISIT (OUTPATIENT)
Dept: CARDIOLOGY | Facility: CLINIC | Age: 64
End: 2024-12-02
Attending: INTERNAL MEDICINE
Payer: COMMERCIAL

## 2024-12-02 VITALS
OXYGEN SATURATION: 96 % | DIASTOLIC BLOOD PRESSURE: 89 MMHG | BODY MASS INDEX: 38.6 KG/M2 | WEIGHT: 269 LBS | HEART RATE: 69 BPM | SYSTOLIC BLOOD PRESSURE: 169 MMHG

## 2024-12-02 DIAGNOSIS — G47.30 SLEEP APNEA: ICD-10-CM

## 2024-12-02 DIAGNOSIS — R60.0 BILATERAL LOWER EXTREMITY EDEMA: ICD-10-CM

## 2024-12-02 DIAGNOSIS — I10 POORLY-CONTROLLED HYPERTENSION: ICD-10-CM

## 2024-12-02 DIAGNOSIS — I50.30 HEART FAILURE WITH PRESERVED EJECTION FRACTION, NYHA CLASS II (H): Primary | ICD-10-CM

## 2024-12-02 DIAGNOSIS — I50.30 HEART FAILURE WITH PRESERVED EJECTION FRACTION, NYHA CLASS I (H): Primary | ICD-10-CM

## 2024-12-02 PROCEDURE — 93005 ELECTROCARDIOGRAM TRACING: CPT

## 2024-12-02 PROCEDURE — 99204 OFFICE O/P NEW MOD 45 MIN: CPT | Mod: GC | Performed by: INTERNAL MEDICINE

## 2024-12-02 PROCEDURE — 99213 OFFICE O/P EST LOW 20 MIN: CPT | Performed by: INTERNAL MEDICINE

## 2024-12-02 RX ORDER — FUROSEMIDE 40 MG/1
40 TABLET ORAL DAILY
Qty: 7 TABLET | Refills: 0 | Status: SHIPPED | OUTPATIENT
Start: 2024-12-02

## 2024-12-02 RX ORDER — SPIRONOLACTONE 25 MG/1
25 TABLET ORAL DAILY
Qty: 90 TABLET | Refills: 3 | Status: SHIPPED | OUTPATIENT
Start: 2024-12-02

## 2024-12-02 ASSESSMENT — PAIN SCALES - GENERAL: PAINLEVEL_OUTOF10: NO PAIN (0)

## 2024-12-02 NOTE — Clinical Note
12/2/2024      RE: Lesli Jackson  [address]  [facility] [address]       Dear Colleague,    Thank you for the opportunity to participate in the care of your patient, Lesli Jackson, at the [facility] [place] [facility]. Please see a copy of my visit note below.    History:      Lesli Jackson is a very pleasant 64 year old woman with    COPD on 2L Home O2  Class III obesity  Essential Hypertension  T2DM  HLD  Tobacco Use Disorder, quit 3m ago    Presenting for assessment of abnormal TTE    She was admitted to the hospital in 9/2024 for COPD exacerbation. She had a TTE at that time revaling Normal LF size and systolic fx, concentric remodeling, abnormal diastolic fx. Elevate LV pressures by E/E`, Normal RV fx, mild PHTN, Moderate MAC, mild TR, dilated IVC with abnormal respiratory variation. She was discharged with Home O2, She has not been smoking since then.    Her BP is controlled with lisinopril 60 mg, carvedilol 6.25 mg and hydrochlorothiazide 25 mg  Her LDL is 70 on lovastatin 10 mg    Patient denies dyspnea on exertion. Denies chest pain. Denies orthopnea, paroxysmal nocturnal dyspnea, palpitations or syncope.    Allergies - reviewed     Allergies   Allergen Reactions    Dust Mites     Amoxicillin Rash       Past history -reviewed  Past Medical History:   Diagnosis Date    Abnormal vaginal bleeding 11/10/2016    Baker's cyst of knee     Lt (7.3x4.3x1.6 cm)    Elevated glucose     Hyperlipidemia LDL goal <130     Hypertension goal BP (blood pressure) < 140/90     Hypoxia 09/02/2024    Idiopathic edema     NONSPECIFIC MEDICAL HISTORY     nephrolithiasis    Personal history of smoking     quit in 2010    Secondary lymphedema 8/4/19    DX by [facility] [place], lose wt. at this point    Unspecified iridocyclitis         Social history - reviewed  Social History     Tobacco Use    Smoking status: Former     Current packs/day: 0.00     Average  packs/day: 1 pack/day for 20.0 years (20.0 ttl pk-yrs)     Types: Cigarettes     Start date: 1990     Quit date: 2010     Years since quittin.9    Smokeless tobacco: Former    Tobacco comments:     5 cigarettes day or less   Vaping Use    Vaping status: Never Used   Substance Use Topics    Alcohol use: Yes     Comment: 1-2  beer daily on average    Drug use: No       Family history -reviewed  Family History   Problem Relation Age of Onset    C.A.D. Father     Diabetes Father     Cerebrovascular Disease Father         stroke    Eye Disorder Father         cataracts    Heart Disease Father     Hypertension Mother     Arthritis Mother     Cancer Mother         lymphoma    Abdominal Aortic Aneurysm Brother 63        nonsmoker    Prostate Cancer Maternal Uncle     Allergies Sister     Eye Disorder Other         iritis    Lipids Other         one of her parents    Thyroid Disease Sister        ROS: non contributory on the 10-point review of system    Exam:     LMP 2012   In general, the patient is in no apparent distress.      HEENT: Sclerae white, not injected.    Neck: No JVD. No thyromegaly  Heart: RRR. Normal S1, S2. No murmur, rub, click, or gallop.    Lungs: Clear bilaterally.  No rhonchi, wheezes, rales.   Extremities: No edema.  The pulses are 2+at the radial bilaterally.      I have independently reviewed this patient's relevant laboratory and cardiac data :    Recent Labs   Lab Test 10/24/24  1418 24  1022   CHOL 161 163   HDL 55 44*   LDL 70 83   TRIG 181* 179*      Recent Labs   Lab Test 24  1539 24  1022   AST 38 28     Recent Labs   Lab Test 24  1539 24  1022   ALT 20 19     Recent Labs   Lab Test 10/24/24  1418 24  0839    137   POTASSIUM 3.9 4.6   CHLORIDE 97* 94*   CO2 29 33*   ANIONGAP 14 10   BUN 10.6 14.0   CR 0.72 0.58     Recent Labs   Lab Test 24  0633 24  0701   WBC 8.9 8.9   RBC 4.31 4.63   HGB 13.1 14.4   MCV 93 93     166     Recent Labs   Lab Test 10/24/24  1418 09/03/24  0701   A1C 7.0* 7.1*     Recent Labs   Lab Test 11/26/19  0912 11/16/18  0922   TSH 3.97 2.97       ECG 9/2024: NSR, RBBB, LAFB    Echo 9/2024: Per my read, normal LV size and systolic fx, concentric remodeling, abnormal diastolic fx. Elevated LV pressures by E/E`, Normal RV fx, mild PHTN, Moderate MAC, mild TR, dilated IVC with abnormal respiratory variation    CT Chest 9/3/2024: Moderate CAC    Coronary angiogram N/A    Assessment and Plan:  64 year old patient with    COPD on 2L Home O2  Class III obesity  Essential Hypertension  T2DM  HLD  Tobacco Use Disorder, quit 3m ago  Mild PH (likely Class II)  HFpEF    Lesli Jackson has     Patient is without symptoms concerning for angina or heart failure. Patient is euvolemic on exam today with a normal heart rate and blood pressure. Patient is in sinus rhythm today. Patient has normal biventricular function with no significant valvular abnormalities.     I would recommend aggressive medical therapy with optimal blood pressure, lipid and glycemic control    Recommendations:   Increase lovastatin to 1 tab every day (20 mg)  Decrease lisinopril dose to 1 tab (40 mg)  Start Spironolactone 25 mg and Jardiance 10 mg  Labs in 2 weeks  Recommend asking PCP about GLP 1-RA      Joe Arnold MD  Cardiovascular Disease Fellow      History:      Lesli Jackson is a very pleasant 64 year old woman with    COPD on 2L Home O2  Class III obesity  Essential Hypertension  T2DM  HLD  Tobacco Use Disorder, quit 3m ago    Presenting for assessment of abnormal TTE    She was admitted to the hospital in 9/2024 for COPD exacerbation. She had a TTE at that time revaling normal LF size and systolic fx, concentric remodeling, abnormal diastolic fx. Elevate LV pressures by E/E`, Normal RV fx, mild PHTN, Moderate MAC, mild TR, dilated IVC with abnormal respiratory variation. She was discharged with Home O2, She has not been smoking since  then.    Her BP is controlled with lisinopril 60 mg, carvedilol 6.25 mg and hydrochlorothiazide 25 mg  Her LDL is 70 on lovastatin 10 mg    Patient denies dyspnea on exertion. Denies chest pain. Denies orthopnea, paroxysmal nocturnal dyspnea, palpitations or syncope.    Allergies - reviewed     Allergies   Allergen Reactions     Dust Mites      Amoxicillin Rash       Past history -reviewed  Past Medical History:   Diagnosis Date     Abnormal vaginal bleeding 11/10/2016     Baker's cyst of knee     Lt (7.3x4.3x1.6 cm)     Elevated glucose      Hyperlipidemia LDL goal <130      Hypertension goal BP (blood pressure) < 140/90      Hypoxia 2024     Idiopathic edema      NONSPECIFIC MEDICAL HISTORY     nephrolithiasis     Personal history of smoking     quit in      Secondary lymphedema 19    DX by [facility] [place], lose wt. at this point     Unspecified iridocyclitis         Social history - reviewed  Social History     Tobacco Use     Smoking status: Former     Current packs/day: 0.00     Average packs/day: 1 pack/day for 20.0 years (20.0 ttl pk-yrs)     Types: Cigarettes     Start date: 1990     Quit date: 2010     Years since quittin.9     Smokeless tobacco: Former     Tobacco comments:     5 cigarettes day or less   Vaping Use     Vaping status: Never Used   Substance Use Topics     Alcohol use: Yes     Comment: 1-2  beer daily on average     Drug use: No       Family history -reviewed  Family History   Problem Relation Age of Onset     C.A.D. Father      Diabetes Father      Cerebrovascular Disease Father         stroke     Eye Disorder Father         cataracts     Heart Disease Father      Hypertension Mother      Arthritis Mother      Cancer Mother         lymphoma     Abdominal Aortic Aneurysm Brother 63        nonsmoker     Prostate Cancer Maternal Uncle      Allergies Sister      Eye Disorder Other         iritis     Lipids Other         one of her parents     Thyroid Disease Sister         ROS: non contributory on the 10-point review of system    Exam:     LMP 12/22/2012   In general, the patient is in no apparent distress.      HEENT: Sclerae white, not injected.    Neck: No JVD. No thyromegaly  Heart: RRR. Normal S1, S2. No murmur, rub, click, or gallop.    Lungs: Clear bilaterally.  No rhonchi, wheezes, rales.   Extremities: No edema.  The pulses are 2+at the radial bilaterally.      I have independently reviewed this patient's relevant laboratory and cardiac data :    Recent Labs   Lab Test 10/24/24  1418 03/08/24  1022   CHOL 161 163   HDL 55 44*   LDL 70 83   TRIG 181* 179*      Recent Labs   Lab Test 09/02/24  1539 03/08/24  1022   AST 38 28     Recent Labs   Lab Test 09/02/24  1539 03/08/24  1022   ALT 20 19     Recent Labs   Lab Test 10/24/24  1418 09/06/24  0839    137   POTASSIUM 3.9 4.6   CHLORIDE 97* 94*   CO2 29 33*   ANIONGAP 14 10   BUN 10.6 14.0   CR 0.72 0.58     Recent Labs   Lab Test 09/05/24  0633 09/03/24  0701   WBC 8.9 8.9   RBC 4.31 4.63   HGB 13.1 14.4   MCV 93 93    166     Recent Labs   Lab Test 10/24/24  1418 09/03/24  0701   A1C 7.0* 7.1*     Recent Labs   Lab Test 11/26/19  0912 11/16/18  0922   TSH 3.97 2.97       ECG 9/2024: NSR, RBBB, LAFB    Echo 9/2024: Per my read, normal LV size and systolic fx, concentric remodeling, abnormal diastolic fx. Elevated LV pressures by E/E`, Normal RV fx, mild PHTN, Moderate MAC, mild TR, dilated IVC with abnormal respiratory variation    CT Chest 9/3/2024: Moderate CAC    Coronary angiogram N/A    Assessment and Plan:  64 year old patient with    COPD on 2L Home O2  Class III obesity  Essential Hypertension  T2DM  HLD  Tobacco Use Disorder, quit 3m ago  Mild PH (likely Class II)  HFpEF    Lesli Jackson has     Patient is without symptoms concerning for angina or heart failure. Patient is euvolemic on exam today with a normal heart rate and blood pressure. Patient is in sinus rhythm today. Patient has normal  biventricular function with no significant valvular abnormalities.     I would recommend aggressive medical therapy with optimal blood pressure, lipid and glycemic control    Recommendations:   Increase lovastatin to 1 tab every day (20 mg)  Decrease lisinopril dose to 1 tab (40 mg)  Start Spironolactone 25 mg and Jardiance 10 mg  Labs in 2 weeks  Recommend asking PCP about GLP 1-RA      Joe Arnold MD  Cardiovascular Disease Fellow        Please do not hesitate to contact me if you have any questions/concerns.     Sincerely,     Stefani Haynes MD

## 2024-12-02 NOTE — PROGRESS NOTES
History:      Lesli Jackson is a very pleasant 64 year old woman with    COPD  Class II obesity  Essential Hypertension  T2DM  HLD  Tobacco Use Disorder, quit 3m ago    Presenting for assessment of abnormal TTE    She was admitted to the hospital in 9/2024 for COPD exacerbation. She had a TTE at that time revaling normal LF size and systolic fx, concentric remodeling, abnormal diastolic fx. Elevate LV pressures by E/E`, Normal RV fx, mild PHTN, Moderate MAC, mild TR, dilated IVC with abnormal respiratory variation. She was discharged with Home O2, She has not been smoking since then.    Today, she tells that she no longer has oxygen at home because her saturations are around 96 without oxygen.  She has been compliant with her medications and took her medications this morning.  Her exercise capacity is limited by her knees, but she tells me that if she exerted herself she would probably have shortness of breath.  She does not have any orthopnea, PND, palpitations, syncope.  Her biggest complaint is her leg swelling.  She tells me that she has daytime fatigue and her partner says that she has snoring    Her family history significant for brother having aortic dissection    Her BP is not controlled with lisinopril 60 mg, carvedilol 6.25 mg and hydrochlorothiazide 25 mg  Her LDL is 70 on lovastatin 10 mg      Allergies - reviewed     Allergies   Allergen Reactions    Dust Mites     Amoxicillin Rash       Past history -reviewed  Past Medical History:   Diagnosis Date    Abnormal vaginal bleeding 11/10/2016    Baker's cyst of knee     Lt (7.3x4.3x1.6 cm)    Elevated glucose     Hyperlipidemia LDL goal <130     Hypertension goal BP (blood pressure) < 140/90     Hypoxia 09/02/2024    Idiopathic edema     NONSPECIFIC MEDICAL HISTORY     nephrolithiasis    Personal history of smoking     quit in 2010    Secondary lymphedema 8/4/19    DX by [facility] [place], lose wt. at this point    Unspecified iridocyclitis         Social  history - reviewed  Social History     Tobacco Use    Smoking status: Former     Current packs/day: 0.00     Average packs/day: 1 pack/day for 20.0 years (20.0 ttl pk-yrs)     Types: Cigarettes     Start date: 1990     Quit date: 2010     Years since quittin.9    Smokeless tobacco: Former    Tobacco comments:     5 cigarettes day or less   Vaping Use    Vaping status: Never Used   Substance Use Topics    Alcohol use: Yes     Comment: 1-2  beer daily on average    Drug use: No       Family history -reviewed  Family History   Problem Relation Age of Onset    C.A.D. Father     Diabetes Father     Cerebrovascular Disease Father         stroke    Eye Disorder Father         cataracts    Heart Disease Father     Hypertension Mother     Arthritis Mother     Cancer Mother         lymphoma    Abdominal Aortic Aneurysm Brother 63        nonsmoker    Prostate Cancer Maternal Uncle     Allergies Sister     Eye Disorder Other         iritis    Lipids Other         one of her parents    Thyroid Disease Sister        ROS: non contributory on the 10-point review of system    Exam:     LMP 2012   In general, the patient is in no apparent distress.      HEENT: Sclerae white, not injected.    Neck: No JVD. No thyromegaly  Heart: RRR. Normal S1, S2. No murmur, rub, click, or gallop.    Lungs: Decreased lung sounds bilaterally  Extremities: 3-4+ pretibial edema. The pulses are 2+at the radial bilaterally.      I have independently reviewed this patient's relevant laboratory and cardiac data :    Recent Labs   Lab Test 10/24/24  1418 24  1022   CHOL 161 163   HDL 55 44*   LDL 70 83   TRIG 181* 179*      Recent Labs   Lab Test 24  1539 24  1022   AST 38 28     Recent Labs   Lab Test 24  1539 24  1022   ALT 20 19     Recent Labs   Lab Test 10/24/24  1418 24  0839    137   POTASSIUM 3.9 4.6   CHLORIDE 97* 94*   CO2 29 33*   ANIONGAP 14 10   BUN 10.6 14.0   CR 0.72 0.58     Recent  Labs   Lab Test 09/05/24  0633 09/03/24  0701   WBC 8.9 8.9   RBC 4.31 4.63   HGB 13.1 14.4   MCV 93 93    166     Recent Labs   Lab Test 10/24/24  1418 09/03/24  0701   A1C 7.0* 7.1*     Recent Labs   Lab Test 11/26/19  0912 11/16/18  0922   TSH 3.97 2.97       ECG 9/2024: NSR, RBBB, LAFB    Echo 9/2024: Per my read, normal LV size and systolic fx, concentric remodeling, abnormal diastolic fx. Elevated LV pressures by E/E`, Normal RV fx, mild PHTN, Moderate MAC, mild TR, dilated IVC with abnormal respiratory variation    CT Chest 9/3/2024: Moderate CAC    Coronary angiogram N/A    Assessment and Plan:  64 year old patient with    COPD  Class II obesity  Essential Hypertension  T2DM  HLD  Tobacco Use Disorder, quit 3m ago  Mild PH (likely Class II/III)  HFpEF    Lesli Jackson has heart failure with preserved ejection as evidenced by significant lower leg edema as well as diastolic dysfunction, elevated right and left-sided filling pressures.  She also has mild pulmonary hypertension on TTE, likely class II/III.  She does not endorse dyspnea on exertion however her exercise capacity is limited by her knees.  Her blood pressure is suboptimally controlled with her current regimen.  She does not have any symptoms concerning for angina.    Patient is hypervolemic on exam today with a normal heart rate.  Her blood pressure is elevated despite her taking blood pressure medications. Patient is in sinus rhythm today. Patient has normal biventricular function with no significant valvular abnormalities.     Her blood pressure might be elevated secondary to an underlying DAVID given snoring, therefore she will benefit from a sleep study    Recommendations:   Start Spironolactone 25 mg and Jardiance 10 mg  Lasix 40 mg daily for 7 days  Labs and core clinic visit 2 weeks  Sleep study      Joe Arnold MD  Cardiovascular Disease Fellow     fellow. I have reviewed the vitals, laboratory and imaging data relevant to this patient's care. I have edited this note to reflect our joint assessment and plan, and discussed the plan with the patient.    Stefani Haynes MD, MS  Professor of Medicine  Cardiovascular Medicine

## 2024-12-02 NOTE — PATIENT INSTRUCTIONS
You were seen today in the Cardiovascular Clinic at the [facility].   Cardiology Providers you saw during your visit:    Dr. Haynes    Diagnosis:   Bilateral lower extremity edema    Recommendations:   - START Jardiance 10 mg daily  - START spironolactone 25 mg daily  - Take Lasix 40 mg daily for 7 days  - CORE follow up in 2 weeks with labs prior  - Sleep study referral placed    Follow-up:   6 month follow up    For emergencies call 911.     If you have any questions regarding your visit please contact your care team:     Rashad Dillon RN  [facility]  Cardiology Care Coordinator-General     Appointment scheduling or nurse questions: [phone]    On Call Cardiologist for after hours or on weekends: [phone]    option #4    If you need a medication refill please contact your pharmacy.  Please allow 3 business days for your refill to be completed.    As always, thank you for trusting us with your health care needs!

## 2024-12-02 NOTE — NURSING NOTE
Chief Complaint   Patient presents with    New Patient      NEW CARDIOLOGY      Vitals were taken, medications reconciled, and EKG was performed.    Lew Pearce, EMT  3:18 PM

## 2024-12-03 ENCOUNTER — MYC REFILL (OUTPATIENT)
Dept: FAMILY MEDICINE | Facility: CLINIC | Age: 64
End: 2024-12-03
Payer: COMMERCIAL

## 2024-12-03 ENCOUNTER — TELEPHONE (OUTPATIENT)
Dept: PULMONOLOGY | Facility: CLINIC | Age: 64
End: 2024-12-03
Payer: COMMERCIAL

## 2024-12-03 DIAGNOSIS — I10 ESSENTIAL HYPERTENSION WITH GOAL BLOOD PRESSURE LESS THAN 140/90: ICD-10-CM

## 2024-12-03 LAB
ATRIAL RATE - MUSE: 68 BPM
DIASTOLIC BLOOD PRESSURE - MUSE: NORMAL MMHG
INTERPRETATION ECG - MUSE: NORMAL
P AXIS - MUSE: 75 DEGREES
PR INTERVAL - MUSE: 170 MS
QRS DURATION - MUSE: 150 MS
QT - MUSE: 464 MS
QTC - MUSE: 493 MS
R AXIS - MUSE: -76 DEGREES
SYSTOLIC BLOOD PRESSURE - MUSE: NORMAL MMHG
T AXIS - MUSE: 66 DEGREES
VENTRICULAR RATE- MUSE: 68 BPM

## 2024-12-03 RX ORDER — HYDROCHLOROTHIAZIDE 50 MG/1
25 TABLET ORAL DAILY
Qty: 45 TABLET | Refills: 3 | Status: SHIPPED | OUTPATIENT
Start: 2024-12-03

## 2024-12-03 NOTE — TELEPHONE ENCOUNTER
Patient Contacted for the patient to call back and schedule the following:    Appointment type: Return Pulm  Provider: Ute  Return date: 2/27/2025  Specialty phone number: [phone]  Additional appointment(s) needed: na  Additonal Notes: na

## 2024-12-05 ENCOUNTER — TELEPHONE (OUTPATIENT)
Dept: CARDIOLOGY | Facility: CLINIC | Age: 64
End: 2024-12-05
Payer: COMMERCIAL

## 2024-12-05 NOTE — TELEPHONE ENCOUNTER
----- Message from Phylicia SY sent at 12/4/2024  5:47 PM CST -----  Regarding: RE: Enrollment CORE in 2 weeks  Miracle - an appt with Casandra just opened on 12/12 940am, needs labs at 9am.      Rachel - would you please call her and offer this spot?    Thanks  Petar  ----- Message -----  From: Rachel Person  Sent: 12/3/2024   5:14 PM CST  To: Shiela Coronel, RN; #  Subject: RE: Enrollment CORE in 2 weeks                   Yes that was first available for all providers (Astrid Garcia). That's why she insisted I check if we have something else we can offer since she refused the waitlist.     Thanks!    Rachel Person  ----- Message -----  From: Phylicia Barksdale RN  Sent: 12/3/2024   8:01 AM CST  To: Rachel Person; Shiela Coronel RN; #  Subject: RE: Enrollment CORE in 2 weeks                   Osmin Ramos,    Thanks for the message.  Would you please call the patient back and let her know that unfortunately Astrid Garcia only has 2 clinics in December 12/26 and 12/27.  She is otherwise out of the office or covering inpatient service.  12/26 is full currently.    We could again try to offer another provider?    Thanks  [facility]  ----- Message -----  From: Rachel Person  Sent: 12/2/2024   4:24 PM CST  To: Shiela Coronel RN; #  Subject: Enrollment CORE in 2 weeks                       Hi,    Scheduled this pt for Enrollment CORE w/ Astrid Garcia 12/27/2024. We did not have anything sooner w/ any other CORE provider and pt refused to be added to the waitlist since she works and cannot accept a cancellation at the last minute. She also refused to see a provider at a different clinic. She insisted I check w/ the nurse team to see if she can be seen sooner than 12/27/2024. Let me know if this is possible.     Thank you all,    Rachel Person

## 2024-12-05 NOTE — TELEPHONE ENCOUNTER
12/5/2024 11:15AM Rachel Person  Called patient and offered a sooner Enrollment CORE appointment with ALICE Harvey CNP on this date 12/12/2024 & time 9:40AM w/ labs prior at 8:45AM at this [place] [facility] [place] [address] [place] [id number].    12/5 Offered Enrollment CORE w/ Casandra Nichols 12/12 w/ labs prior- pt doesn't want appt before 12/16. MJ      Please note there is no guarantee this appointment will be available as it is first come first serve.   Rachel Person 12/5/2024 11:15AM

## 2024-12-23 DIAGNOSIS — I50.30 HEART FAILURE WITH PRESERVED EJECTION FRACTION, NYHA CLASS II (H): Primary | ICD-10-CM

## 2024-12-27 ENCOUNTER — LAB (OUTPATIENT)
Dept: LAB | Facility: CLINIC | Age: 64
End: 2024-12-27
Attending: NURSE PRACTITIONER
Payer: COMMERCIAL

## 2024-12-27 DIAGNOSIS — I50.30 HEART FAILURE WITH PRESERVED EJECTION FRACTION, NYHA CLASS II (H): ICD-10-CM

## 2024-12-27 LAB
ANION GAP SERPL CALCULATED.3IONS-SCNC: 12 MMOL/L (ref 7–15)
BUN SERPL-MCNC: 25.7 MG/DL (ref 8–23)
CALCIUM SERPL-MCNC: 9.7 MG/DL (ref 8.8–10.4)
CHLORIDE SERPL-SCNC: 97 MMOL/L (ref 98–107)
CREAT SERPL-MCNC: 0.96 MG/DL (ref 0.51–0.95)
EGFRCR SERPLBLD CKD-EPI 2021: 66 ML/MIN/1.73M2
GLUCOSE SERPL-MCNC: 163 MG/DL (ref 70–99)
HCO3 SERPL-SCNC: 26 MMOL/L (ref 22–29)
NT-PROBNP SERPL-MCNC: 57 PG/ML (ref 0–900)
POTASSIUM SERPL-SCNC: 4.8 MMOL/L (ref 3.4–5.3)
SODIUM SERPL-SCNC: 135 MMOL/L (ref 135–145)

## 2024-12-27 PROCEDURE — 80048 BASIC METABOLIC PNL TOTAL CA: CPT | Performed by: PATHOLOGY

## 2024-12-27 PROCEDURE — 36415 COLL VENOUS BLD VENIPUNCTURE: CPT | Performed by: PATHOLOGY

## 2024-12-27 PROCEDURE — 83880 ASSAY OF NATRIURETIC PEPTIDE: CPT | Performed by: PATHOLOGY

## 2025-02-09 DIAGNOSIS — I10 ESSENTIAL HYPERTENSION WITH GOAL BLOOD PRESSURE LESS THAN 140/90: ICD-10-CM

## 2025-02-10 DIAGNOSIS — E78.5 HYPERLIPIDEMIA LDL GOAL <130: ICD-10-CM

## 2025-02-10 RX ORDER — LOVASTATIN 20 MG/1
10 TABLET ORAL AT BEDTIME
Qty: 45 TABLET | Refills: 1 | Status: SHIPPED | OUTPATIENT
Start: 2025-02-10

## 2025-02-11 RX ORDER — CARVEDILOL 6.25 MG/1
6.25 TABLET ORAL 2 TIMES DAILY WITH MEALS
Qty: 60 TABLET | Refills: 11 | Status: SHIPPED | OUTPATIENT
Start: 2025-02-11

## 2025-03-30 NOTE — PROGRESS NOTES
[facility] Cardiology   [facility]      HPI:   Ms. Jackson is a 64 year old female with medical history pertinent for COPD, T2DM, HTN, HLD, CAC on CT, mild PAH, tobacco use disorder (quit 9/2024), and HFpEF. She presents to clinic for CORE follow up.     Ms. Jackson was hospitalized in Sept '24 for COPD exacerbation. At that time she underwent TTE that was notable for diastolic dysfunction, normal biventricular filling, concentric remodeling, elevated left and right sided filling pressures, moderate mitral annular calcification, dilated IVC. She was discharged on home O2, which she has since weaned.     She was referred to cardiology and was evaluated by Dr. Haynes in early Dec '24. At this visit she did not endorse SOB or CAMEJO, however activity and her exercise capacity is limited by her knees. She has had chronic BLE edema. She had poorly controlled blood pressure at this visit and she was started on jardiance and spironolactone and referred to CORE.     Initial CORE visit 12/27/24. At that time she reported feeling well. We reduced lisinopril to 40 mg daily and changed lasix to PRN.     Today, Lesli reports feeling well. Hot flashes resolved but does reports some spotty vaginal bleeding. Denies pain and denies hx of abnormal pap smear. She has been monitoring BP daily, ranging 120s/60s-70s. Denies CP or palpitations. No SOB or CAMEJO. Denies orthopnea, PND, abdominal distention. She has chronic lymphedema. She admits to not being very active. She enjoys walking and since having improvement in edema, walking has been easier and she is more motivated to walk more.   She follows a vegetarian diet and watches her salt. She has not smokes since hospital admission in Sept' 24. Continues to have 3-4 drinks/week.     Cardiac Medications  - coreg 6.25 mg BID  - jardiance 10 mg daily   - spironolactone 25 mg daily   - hydrochlorothiazide 25 mg daily   - lisinopril 40 mg daily   - lovastatin 10 mg daily   - lasix 40 mg daily  PRN    PAST MEDICAL HISTORY:  Past Medical History:   Diagnosis Date    Abnormal vaginal bleeding 11/10/2016    Baker's cyst of knee     Lt (7.3x4.3x1.6 cm)    Elevated glucose     Hyperlipidemia LDL goal <130     Hypertension goal BP (blood pressure) < 140/90     Hypoxia 2024    Idiopathic edema     NONSPECIFIC MEDICAL HISTORY     nephrolithiasis    Personal history of smoking     quit in     Secondary lymphedema 19    DX by [facility] [place], lose wt. at this point    Unspecified iridocyclitis        FAMILY HISTORY:  Family History   Problem Relation Age of Onset    C.A.D. Father     Diabetes Father     Cerebrovascular Disease Father         stroke    Eye Disorder Father         cataracts    Heart Disease Father     Hypertension Mother     Arthritis Mother     Cancer Mother         lymphoma    Abdominal Aortic Aneurysm Brother 63        nonsmoker    Prostate Cancer Maternal Uncle     Allergies Sister     Eye Disorder Other         iritis    Lipids Other         one of her parents    Thyroid Disease Sister        SOCIAL HISTORY:  Social History     Socioeconomic History    Marital status: Single    Years of education: 16   Occupational History    Occupation:      Employer: [employer]   Tobacco Use    Smoking status: Former     Current packs/day: 0.00     Average packs/day: 1 pack/day for 20.0 years (20.0 ttl pk-yrs)     Types: Cigarettes     Start date: 1990     Quit date: 2010     Years since quittin.9    Smokeless tobacco: Former    Tobacco comments:     5 cigarettes day or less   Vaping Use    Vaping status: Never Used   Substance and Sexual Activity    Alcohol use: Yes     Comment: 1-2  beer daily on average    Drug use: No    Sexual activity: Yes     Partners: Male   Other Topics Concern    Parent/sibling w/ CABG, MI or angioplasty before 65F 55M? Yes   Social History Narrative    Balanced Diet - Yes    Osteoporosis Prevention Measures - Dairy servings  per day: 1-2    Regular Exercise -  Yes Describe does a little walking everyday    Dental Exam - NO    Eye Exam - NO    Self Breast Exam - Yes    Abuse: Current or Past (Physical, Sexual or Emotional)- No    Do you feel safe in your environment - Yes    Guns stored in the home - No    Sunscreen used - Yes    Seatbelts used - Yes    Lipids -  YES - Date: 040405    Glucose -  YES - Date: 040405    Colon Cancer Screening - No    Hemoccults - NO    Pap Test -  YES - Date: 050702    Do you have any concerns about STD's -  No    Mammography - NO    DEXA - NO    Immunizations reviewed and up to date - Yes, last td given [id number]     Social Drivers of Health     Financial Resource Strain: Low Risk  (10/23/2024)    Financial Resource Strain     Within the past 12 months, have you or your family members you live with been unable to get utilities (heat, electricity) when it was really needed?: No   Food Insecurity: Low Risk  (10/23/2024)    Food Insecurity     Within the past 12 months, did you worry that your food would run out before you got money to buy more?: No     Within the past 12 months, did the food you bought just not last and you didn t have money to get more?: No   Transportation Needs: Low Risk  (10/23/2024)    Transportation Needs     Within the past 12 months, has lack of transportation kept you from medical appointments, getting your medicines, non-medical meetings or appointments, work, or from getting things that you need?: No   Physical Activity: Inactive (10/23/2024)    Exercise Vital Sign     Days of Exercise per Week: 0 days     Minutes of Exercise per Session: 10 min   Stress: No Stress Concern Present (10/23/2024)    [employer] [place] [employer] - Occupational Stress Questionnaire     Feeling of Stress : Not at all   Social Connections: Unknown (10/23/2024)    Social Connection and Isolation Panel [NHANES]     Frequency of Social Gatherings with Friends and Family: Once a week   Interpersonal  Safety: Low Risk  (10/24/2024)    Interpersonal Safety     Do you feel physically and emotionally safe where you currently live?: Yes     Within the past 12 months, have you been hit, slapped, kicked or otherwise physically hurt by someone?: No     Within the past 12 months, have you been humiliated or emotionally abused in other ways by your partner or ex-partner?: No   Housing Stability: Low Risk  (10/23/2024)    Housing Stability     Do you have housing? : Yes     Are you worried about losing your housing?: No       CURRENT MEDICATIONS:  Current Outpatient Medications   Medication Sig Dispense Refill    albuterol (PROAIR HFA/PROVENTIL HFA/VENTOLIN HFA) 108 (90 Base) MCG/ACT inhaler Inhale 2 puffs into the lungs every 6 hours as needed for shortness of breath, wheezing or cough. 18 g 0    budeson-glycopyrrol-formoterol (BREZTRI AEROSPHERE) 160-9-4.8 MCG/ACT AERO inhaler Inhale 2 puffs into the lungs 2 times daily. 5.9 g 11    carvedilol (COREG) 6.25 MG tablet TAKE 1 TABLET(6.25 MG) BY MOUTH TWICE DAILY WITH MEALS 60 tablet 11    empagliflozin (JARDIANCE) 10 MG TABS tablet Take 1 tablet (10 mg) by mouth daily. 90 tablet 3    Flaxseed, Linseed, 1000 MG CAPS Take 6 capsules by mouth daily.      fluticasone (FLONASE) 50 MCG/ACT nasal spray Spray 1 spray into both nostrils daily. 15.8 mL 5    hydroCHLOROthiazide (HYDRODIURIL) 25 MG tablet Take 1 tablet (25 mg) by mouth daily. 90 tablet 3    lisinopril (ZESTRIL) 40 MG tablet Take 1 tablet (40 mg) by mouth daily. 45 tablet 11    lovastatin (MEVACOR) 20 MG tablet Take 0.5 tablets (10 mg) by mouth at bedtime. 45 tablet 1    methylcellulose (CITRUCEL) powder Take 0.85 g (3 teaspoonful) by mouth daily      MULTIPLE VIT-MIN-CALCIUM-FA PO Take 1 tablet by mouth daily.      spironolactone (ALDACTONE) 25 MG tablet Take 1 tablet (25 mg) by mouth daily. 90 tablet 3     No current facility-administered medications for this visit.       ROS:   Refer to HPI    EXAM:  /75 (BP  Location: Right arm, Patient Position: Chair, Cuff Size: Adult Large)   Pulse 69   Wt 120.9 kg (266 lb 8 oz)   LMP 12/22/2012   SpO2 98%   BMI 38.24 kg/m    /75 (BP Location: Right arm, Patient Position: Chair, Cuff Size: Adult Large)   Pulse 69   Wt 120.9 kg (266 lb 8 oz)   LMP 12/22/2012   SpO2 98%   BMI 38.24 kg/m     /75 (BP Location: Right arm, Patient Position: Chair, Cuff Size: Adult Large)   Pulse 69   Wt 120.9 kg (266 lb 8 oz)   LMP 12/22/2012   SpO2 98%   BMI 38.24 kg/m     GENERAL: Appears comfortable, in no acute distress.   HEENT: Eye symmetrical, no discharge or icterus bilaterally. Mucous membranes moist and without lesions.  CV: RRR, +S1S2, no murmur, rub, or gallop. JVP < 6cm.   RESPIRATORY: Respirations regular, even, and unlabored. Lungs CTA throughout.  GI: Soft and non distended with normoactive bowel sounds present in all quadrants. No tenderness, rebound, guarding. No hepatomegaly.   EXTREMITIES: chronic non-pitting bilateral lymphedema. 2+ bilateral pedal pulses.   NEUROLOGIC: Alert and oriented x 3. No focal deficits.   MUSCULOSKELETAL: No joint swelling or tenderness.   SKIN: No jaundice. No rashes or lesions.     Labs, reviewed with patient in clinic today:  CBC RESULTS:  Lab Results   Component Value Date    WBC 8.9 09/05/2024    WBC 11.0 07/08/2016    RBC 4.31 09/05/2024    RBC 5.06 07/08/2016    HGB 13.1 09/05/2024    HGB 15.2 07/08/2016    HCT 40.2 09/05/2024    HCT 44.7 07/08/2016    MCV 93 09/05/2024    MCV 88 07/08/2016    MCH 30.4 09/05/2024    MCH 30.0 07/08/2016    MCHC 32.6 09/05/2024    MCHC 34.0 07/08/2016    RDW 13.4 09/05/2024    RDW 13.7 07/08/2016     09/05/2024     07/08/2016       CMP RESULTS:  Lab Results   Component Value Date     (L) 04/02/2025     01/08/2021    POTASSIUM 5.6 (H) 04/02/2025    POTASSIUM 4.1 03/10/2022    POTASSIUM 3.9 01/08/2021    CHLORIDE 97 (L) 04/02/2025    CHLORIDE 101 03/10/2022    CHLORIDE  "101 01/08/2021    CO2 26 04/02/2025    CO2 33 (H) 03/10/2022    CO2 35 (H) 01/08/2021    ANIONGAP 10 04/02/2025    ANIONGAP 4 03/10/2022    ANIONGAP 2 (L) 01/08/2021     (H) 04/02/2025     (H) 03/10/2022     (H) 01/08/2021    BUN 34.6 (H) 04/02/2025    BUN 17 03/10/2022    BUN 13 01/08/2021    CR 1.19 (H) 04/02/2025    CR 0.67 01/08/2021    GFRESTIMATED 51 (L) 04/02/2025    GFRESTIMATED >90 01/08/2021    GFRESTBLACK >90 01/08/2021    MINA 10.0 04/02/2025    MINA 9.7 01/08/2021    BILITOTAL 0.6 09/02/2024    BILITOTAL 0.5 01/08/2021    ALBUMIN 3.9 09/02/2024    ALBUMIN 3.2 (L) 03/10/2022    ALBUMIN 3.4 01/08/2021    ALKPHOS 98 09/02/2024    ALKPHOS 94 01/08/2021    ALT 20 09/02/2024    ALT 23 01/08/2021    AST 38 09/02/2024    AST 22 01/08/2021        INR RESULTS:  Lab Results   Component Value Date    INR 1.07 06/01/2008       No results found for: \"MAG\"  Lab Results   Component Value Date    NTBNPI 289 09/02/2024    NTBNPI 61 06/01/2008     Lab Results   Component Value Date    NTBNP 57 12/27/2024       Cardiac Diagnostics:    9/6/2024 Echo  Interpretation Summary     The left ventricle is normal in size.  The visual ejection fraction is 60-65%.  Diastolic Doppler findings (E/E' ratio and/or other parameters) suggest left  ventricular filling pressures are increased.  No regional wall motion abnormalities noted.  There is mild mitral stenosis.  There is sclerosis, calcification, and restriction of the aortic valve opening  compatible with trivial aortic stenosis.      Assessment and Plan:   Ms. Jackson is a 64 year old female with medical history pertinent for COPD, T2DM, HTN, HLD, CAC on CT, mild PAH, tobacco use disorder (quit 9/2024), and HFpEF. Jerry presents to clinic for CORE follow up.     We spent time discussed HFpEF diagnosis, as well as goals of care/management. She is already on good GDMT. She was previously referred to sleep medicine. We further discussed reasoning for sleep apnea " evaluation. Lesli is open to this, but is not ready to schedule an appointment. We also spent time discussing healthy weight, importance of increase activity, and good DM control. Her most recent A1c is 7%. Lesli states that she is not a diabetic. She was previously on Metformin. This was stopped at her request, but denies any intolerance or side effects. Her PCP will continue to monitor A1c. We also discussed GLP-1 but she is not agreeable to MTM referral or any type of injectable medication.     She is normotensive. Review of today's labs notable for hyperkalemia with K 5.6 as well as BREEZY with SCr 1.19, baseline 0.93. She reports that she eats a lot of bananas and potatoes. Thinks she drinks < 48 mL of fluid daily. For today we will hold spironolactone. Discussed that this will hopefully allow for an improvement in labs, but she may notice a bump in BP or possibly edema. She will monitor for this. Plan to repeat BMP in 1 week. Recommend increase water intake as well.     Referral to OBGYN given new vaginal bleeding.     # Chronic diastolic heart failure/HFpEF (EF 60-65%)  # Mild PAH, group II  Risk factors include female gender, age, HTN, diabetes, sleep apnea, and obesity. The mainstays of therapy for HFpEF include volume management, blood pressure control, treating underlying sleep apnea if present, treatment of underlying CAD if within the goals of care, weight management, exercise training, rate control for atrial fibrillation as well as consideration for a rhythm control strategy, and consideration for clinical trials. Consideration of spironolactone in low risk patients should be taken given the positive HF results in the TOPCAT trial.  Stage C. NYHA Class III.    Primary Cardiologist: Dr. Haynes; Last seen 12/2/24  BP: controlled   Fluid status: Euvolemic, hydrochlorothiazide 25 mg daily  Aldosterone antagonist: HOLD spironolactone 25 mg daily   SGLT2i: jardiance 10 mg daily   ACEi/ARB/ARNI: n/a, no evidence  for use in HFpEF, lisinopril 40 mg daily for HTN  BB: n/a, no evidence for use in HFpEF, coreg 6.25 mg BID for HTN  SCD prophylaxis: n/a, no evidence for use in HFpEF  NSAID use: contraindicated  Sleep apnea evaluation: Placed:  Snoring, referred to sleep medicine at previous cardiology appointment, encouraged to make appointment   Remote PA Pressure Monitoring ([employer]) Deferred while medical therapy is optimized    # CAC (moderate) seen by CT  # HLD with LDL goal < 70  - LDL 70, at goal. Continue lovastatin 10 mg daily.     # HTN, controlled  - continue lisinopril, coreg, spironolactone, hctz    # T2DM  A1c 7% in October '24 while on metformin which was then discontinued per patient request. Presently only on jardiance 10 mg daily for HFpEF. Given HFpEF and moderate CAC would recommend tighter A1c.   - we will plan to repeat A1c in 1-2 months, but discussed that should A1c be >/=7, would recommend additional medication  - would consider endo referral     # Obesity   BMI ~ 37.61.    Increase in all-cause morbidity and mortality.   - Refer to MTM for evaluation of GLP-1     # Alcohol use Disorder  Patient reportedly consumes 3-4 drinks per day.    - Continue to encourage cessation    # Tobacco Use Disorder  - congratulated on ongoing cessation. Quit in Sept '24.     # Suspected asthma-COPD overlap syndrome  Follows with pulmonology   - discussed pulmonary rehab: doesn't feel that she has bandwidth right now given her work schedule  -continue Breztri  -started on fluticasone for nasal congestion with associated post-nasal drip  -RTC 11/2025 with PFTs, discuss lung cancer screening at that time       Follow up:  - Dr. Haynes 6/9/25  - CORE in 6 months       A total of 40 minutes was spent on the day of the visit, which includes preparation for the visit (reviewing previous medical records, laboratories and investigations), in conjunction with the actual clinic visit with the patient, which includes obtaining a  history and physical exam, creating and reviewing the care plan, patient education (and family if present), counseling, documenting clinical information in the electronic health record and care coordination.       Astrid Garcia DNP, NP-C  Advance Heart Failure  4/2/2025

## 2025-03-31 DIAGNOSIS — I50.30 HEART FAILURE WITH PRESERVED EJECTION FRACTION, NYHA CLASS II (H): Primary | ICD-10-CM

## 2025-04-02 ENCOUNTER — LAB (OUTPATIENT)
Dept: LAB | Facility: CLINIC | Age: 65
End: 2025-04-02
Attending: NURSE PRACTITIONER
Payer: COMMERCIAL

## 2025-04-02 ENCOUNTER — OFFICE VISIT (OUTPATIENT)
Dept: CARDIOLOGY | Facility: CLINIC | Age: 65
End: 2025-04-02
Attending: NURSE PRACTITIONER
Payer: COMMERCIAL

## 2025-04-02 VITALS
OXYGEN SATURATION: 98 % | HEART RATE: 69 BPM | SYSTOLIC BLOOD PRESSURE: 119 MMHG | BODY MASS INDEX: 38.24 KG/M2 | DIASTOLIC BLOOD PRESSURE: 75 MMHG | WEIGHT: 266.5 LBS

## 2025-04-02 DIAGNOSIS — I50.30 HEART FAILURE WITH PRESERVED EJECTION FRACTION, NYHA CLASS II (H): ICD-10-CM

## 2025-04-02 DIAGNOSIS — N93.9 VAGINAL BLEEDING: ICD-10-CM

## 2025-04-02 DIAGNOSIS — I50.30 HEART FAILURE WITH PRESERVED EJECTION FRACTION, NYHA CLASS II (H): Primary | ICD-10-CM

## 2025-04-02 LAB
ANION GAP SERPL CALCULATED.3IONS-SCNC: 10 MMOL/L (ref 7–15)
BUN SERPL-MCNC: 34.6 MG/DL (ref 8–23)
CALCIUM SERPL-MCNC: 10 MG/DL (ref 8.8–10.4)
CHLORIDE SERPL-SCNC: 97 MMOL/L (ref 98–107)
CREAT SERPL-MCNC: 1.19 MG/DL (ref 0.51–0.95)
EGFRCR SERPLBLD CKD-EPI 2021: 51 ML/MIN/1.73M2
GLUCOSE SERPL-MCNC: 252 MG/DL (ref 70–99)
HCO3 SERPL-SCNC: 26 MMOL/L (ref 22–29)
POTASSIUM SERPL-SCNC: 5.6 MMOL/L (ref 3.4–5.3)
SODIUM SERPL-SCNC: 133 MMOL/L (ref 135–145)

## 2025-04-02 PROCEDURE — 3078F DIAST BP <80 MM HG: CPT | Performed by: NURSE PRACTITIONER

## 2025-04-02 PROCEDURE — 80048 BASIC METABOLIC PNL TOTAL CA: CPT | Performed by: PATHOLOGY

## 2025-04-02 PROCEDURE — 99215 OFFICE O/P EST HI 40 MIN: CPT | Performed by: NURSE PRACTITIONER

## 2025-04-02 PROCEDURE — 1126F AMNT PAIN NOTED NONE PRSNT: CPT | Performed by: NURSE PRACTITIONER

## 2025-04-02 PROCEDURE — 36415 COLL VENOUS BLD VENIPUNCTURE: CPT | Performed by: PATHOLOGY

## 2025-04-02 PROCEDURE — 99213 OFFICE O/P EST LOW 20 MIN: CPT | Performed by: NURSE PRACTITIONER

## 2025-04-02 PROCEDURE — 3074F SYST BP LT 130 MM HG: CPT | Performed by: NURSE PRACTITIONER

## 2025-04-02 ASSESSMENT — PAIN SCALES - GENERAL: PAINLEVEL_OUTOF10: NO PAIN (0)

## 2025-04-02 NOTE — NURSING NOTE
Chief Complaint   Patient presents with    Follow Up     RETURN CORE       Vitals were taken, medications reconciled.    FABIO Paige    12:51 PM

## 2025-04-02 NOTE — LETTER
4/2/2025      RE: Lesli Jackson  [address]  [facility] [address]       Dear Colleague,    Thank you for the opportunity to participate in the care of your patient, Lesli Jackson, at the [facility] [place] at [facility]. Please see a copy of my visit note below.      [facility]   [facility] Clinic      HPI:   Ms. Jackson is a 64 year old female with medical history pertinent for COPD, T2DM, HTN, HLD, CAC on CT, mild PAH, tobacco use disorder (quit 9/2024), and HFpEF. She presents to clinic for CORE follow up.     Ms. Jackson was hospitalized in Sept '24 for COPD exacerbation. At that time she underwent TTE that was notable for diastolic dysfunction, normal biventricular filling, concentric remodeling, elevated left and right sided filling pressures, moderate mitral annular calcification, dilated IVC. She was discharged on home O2, which she has since weaned.     She was referred to cardiology and was evaluated by Dr. Haynes in early Dec '24. At this visit she did not endorse SOB or CAMEJO, however activity and her exercise capacity is limited by her knees. She has had chronic BLE edema. She had poorly controlled blood pressure at this visit and she was started on jardiance and spironolactone and referred to CORE.     Initial CORE visit 12/27/24. At that time she reported feeling well. We reduced lisinopril to 40 mg daily and changed lasix to PRN.     Today, Lesli reports feeling well. Hot flashes resolved but does reports some spotty vaginal bleeding. Denies pain and denies hx of abnormal pap smear. She has been monitoring BP daily, ranging 120s/60s-70s. Denies CP or palpitations. No SOB or CAMJEO. Denies orthopnea, PND, abdominal distention. She has chronic lymphedema. She admits to not being very active. She enjoys walking and since having improvement in edema, walking has been easier and she is more motivated to walk more.   She follows a vegetarian diet  and watches her salt. She has not smokes since hospital admission in . Continues to have 3-4 drinks/week.     Cardiac Medications  - coreg 6.25 mg BID  - jardiance 10 mg daily   - spironolactone 25 mg daily   - hydrochlorothiazide 25 mg daily   - lisinopril 40 mg daily   - lovastatin 10 mg daily   - lasix 40 mg daily PRN    PAST MEDICAL HISTORY:  Past Medical History:   Diagnosis Date     Abnormal vaginal bleeding 11/10/2016     Baker's cyst of knee     Lt (7.3x4.3x1.6 cm)     Elevated glucose      Hyperlipidemia LDL goal <130      Hypertension goal BP (blood pressure) < 140/90      Hypoxia 2024     Idiopathic edema      NONSPECIFIC MEDICAL HISTORY     nephrolithiasis     Personal history of smoking     quit in      Secondary lymphedema 19    DX by [facility] [place], lose wt. at this point     Unspecified iridocyclitis        FAMILY HISTORY:  Family History   Problem Relation Age of Onset     C.A.D. Father      Diabetes Father      Cerebrovascular Disease Father         stroke     Eye Disorder Father         cataracts     Heart Disease Father      Hypertension Mother      Arthritis Mother      Cancer Mother         lymphoma     Abdominal Aortic Aneurysm Brother 63        nonsmoker     Prostate Cancer Maternal Uncle      Allergies Sister      Eye Disorder Other         iritis     Lipids Other         one of her parents     Thyroid Disease Sister        SOCIAL HISTORY:  Social History     Socioeconomic History     Marital status: Single     Years of education: 16   Occupational History     Occupation:      Employer: [employer]   Tobacco Use     Smoking status: Former     Current packs/day: 0.00     Average packs/day: 1 pack/day for 20.0 years (20.0 ttl pk-yrs)     Types: Cigarettes     Start date: 1990     Quit date: 2010     Years since quittin.9     Smokeless tobacco: Former     Tobacco comments:     5 cigarettes day or less   Vaping Use     Vaping  status: Never Used   Substance and Sexual Activity     Alcohol use: Yes     Comment: 1-2  beer daily on average     Drug use: No     Sexual activity: Yes     Partners: Male   Other Topics Concern     Parent/sibling w/ CABG, MI or angioplasty before 65F 55M? Yes   Social History Narrative    Balanced Diet - Yes    Osteoporosis Prevention Measures - Dairy servings per day: 1-2    Regular Exercise -  Yes Describe does a little walking everyday    Dental Exam - NO    Eye Exam - NO    Self Breast Exam - Yes    Abuse: Current or Past (Physical, Sexual or Emotional)- No    Do you feel safe in your environment - Yes    Guns stored in the home - No    Sunscreen used - Yes    Seatbelts used - Yes    Lipids -  YES - Date: 040405    Glucose -  YES - Date: 040405    Colon Cancer Screening - No    Hemoccults - NO    Pap Test -  YES - Date: 902806    Do you have any concerns about STD's -  No    Mammography - NO    DEXA - NO    Immunizations reviewed and up to date - Yes, last td given [id number]     Social Drivers of Health     Financial Resource Strain: Low Risk  (10/23/2024)    Financial Resource Strain      Within the past 12 months, have you or your family members you live with been unable to get utilities (heat, electricity) when it was really needed?: No   Food Insecurity: Low Risk  (10/23/2024)    Food Insecurity      Within the past 12 months, did you worry that your food would run out before you got money to buy more?: No      Within the past 12 months, did the food you bought just not last and you didn t have money to get more?: No   Transportation Needs: Low Risk  (10/23/2024)    Transportation Needs      Within the past 12 months, has lack of transportation kept you from medical appointments, getting your medicines, non-medical meetings or appointments, work, or from getting things that you need?: No   Physical Activity: Inactive (10/23/2024)    Exercise Vital Sign      Days of Exercise per Week: 0 days      Minutes of  Exercise per Session: 10 min   Stress: No Stress Concern Present (10/23/2024)    [employer] [place] [employer] - Occupational Stress Questionnaire      Feeling of Stress : Not at all   Social Connections: Unknown (10/23/2024)    Social Connection and Isolation Panel [NHANES]      Frequency of Social Gatherings with Friends and Family: Once a week   Interpersonal Safety: Low Risk  (10/24/2024)    Interpersonal Safety      Do you feel physically and emotionally safe where you currently live?: Yes      Within the past 12 months, have you been hit, slapped, kicked or otherwise physically hurt by someone?: No      Within the past 12 months, have you been humiliated or emotionally abused in other ways by your partner or ex-partner?: No   Housing Stability: Low Risk  (10/23/2024)    Housing Stability      Do you have housing? : Yes      Are you worried about losing your housing?: No       CURRENT MEDICATIONS:  Current Outpatient Medications   Medication Sig Dispense Refill     albuterol (PROAIR HFA/PROVENTIL HFA/VENTOLIN HFA) 108 (90 Base) MCG/ACT inhaler Inhale 2 puffs into the lungs every 6 hours as needed for shortness of breath, wheezing or cough. 18 g 0     budeson-glycopyrrol-formoterol (BREZTRI AEROSPHERE) 160-9-4.8 MCG/ACT AERO inhaler Inhale 2 puffs into the lungs 2 times daily. 5.9 g 11     carvedilol (COREG) 6.25 MG tablet TAKE 1 TABLET(6.25 MG) BY MOUTH TWICE DAILY WITH MEALS 60 tablet 11     empagliflozin (JARDIANCE) 10 MG TABS tablet Take 1 tablet (10 mg) by mouth daily. 90 tablet 3     Flaxseed, Linseed, 1000 MG CAPS Take 6 capsules by mouth daily.       fluticasone (FLONASE) 50 MCG/ACT nasal spray Spray 1 spray into both nostrils daily. 15.8 mL 5     hydroCHLOROthiazide (HYDRODIURIL) 25 MG tablet Take 1 tablet (25 mg) by mouth daily. 90 tablet 3     lisinopril (ZESTRIL) 40 MG tablet Take 1 tablet (40 mg) by mouth daily. 45 tablet 11     lovastatin (MEVACOR) 20 MG tablet Take 0.5 tablets (10 mg)  by mouth at bedtime. 45 tablet 1     methylcellulose (CITRUCEL) powder Take 0.85 g (3 teaspoonful) by mouth daily       MULTIPLE VIT-MIN-CALCIUM-FA PO Take 1 tablet by mouth daily.       spironolactone (ALDACTONE) 25 MG tablet Take 1 tablet (25 mg) by mouth daily. 90 tablet 3     No current facility-administered medications for this visit.       ROS:   Refer to HPI    EXAM:  /75 (BP Location: Right arm, Patient Position: Chair, Cuff Size: Adult Large)   Pulse 69   Wt 120.9 kg (266 lb 8 oz)   LMP 12/22/2012   SpO2 98%   BMI 38.24 kg/m    /75 (BP Location: Right arm, Patient Position: Chair, Cuff Size: Adult Large)   Pulse 69   Wt 120.9 kg (266 lb 8 oz)   LMP 12/22/2012   SpO2 98%   BMI 38.24 kg/m     /75 (BP Location: Right arm, Patient Position: Chair, Cuff Size: Adult Large)   Pulse 69   Wt 120.9 kg (266 lb 8 oz)   LMP 12/22/2012   SpO2 98%   BMI 38.24 kg/m     GENERAL: Appears comfortable, in no acute distress.   HEENT: Eye symmetrical, no discharge or icterus bilaterally. Mucous membranes moist and without lesions.  CV: RRR, +S1S2, no murmur, rub, or gallop. JVP < 6cm.   RESPIRATORY: Respirations regular, even, and unlabored. Lungs CTA throughout.  GI: Soft and non distended with normoactive bowel sounds present in all quadrants. No tenderness, rebound, guarding. No hepatomegaly.   EXTREMITIES: chronic non-pitting bilateral lymphedema. 2+ bilateral pedal pulses.   NEUROLOGIC: Alert and oriented x 3. No focal deficits.   MUSCULOSKELETAL: No joint swelling or tenderness.   SKIN: No jaundice. No rashes or lesions.     Labs, reviewed with patient in clinic today:  CBC RESULTS:  Lab Results   Component Value Date    WBC 8.9 09/05/2024    WBC 11.0 07/08/2016    RBC 4.31 09/05/2024    RBC 5.06 07/08/2016    HGB 13.1 09/05/2024    HGB 15.2 07/08/2016    HCT 40.2 09/05/2024    HCT 44.7 07/08/2016    MCV 93 09/05/2024    MCV 88 07/08/2016    MCH 30.4 09/05/2024    MCH 30.0 07/08/2016     "MCHC 32.6 09/05/2024    MCHC 34.0 07/08/2016    RDW 13.4 09/05/2024    RDW 13.7 07/08/2016     09/05/2024     07/08/2016       CMP RESULTS:  Lab Results   Component Value Date     (L) 04/02/2025     01/08/2021    POTASSIUM 5.6 (H) 04/02/2025    POTASSIUM 4.1 03/10/2022    POTASSIUM 3.9 01/08/2021    CHLORIDE 97 (L) 04/02/2025    CHLORIDE 101 03/10/2022    CHLORIDE 101 01/08/2021    CO2 26 04/02/2025    CO2 33 (H) 03/10/2022    CO2 35 (H) 01/08/2021    ANIONGAP 10 04/02/2025    ANIONGAP 4 03/10/2022    ANIONGAP 2 (L) 01/08/2021     (H) 04/02/2025     (H) 03/10/2022     (H) 01/08/2021    BUN 34.6 (H) 04/02/2025    BUN 17 03/10/2022    BUN 13 01/08/2021    CR 1.19 (H) 04/02/2025    CR 0.67 01/08/2021    GFRESTIMATED 51 (L) 04/02/2025    GFRESTIMATED >90 01/08/2021    GFRESTBLACK >90 01/08/2021    MINA 10.0 04/02/2025    MINA 9.7 01/08/2021    BILITOTAL 0.6 09/02/2024    BILITOTAL 0.5 01/08/2021    ALBUMIN 3.9 09/02/2024    ALBUMIN 3.2 (L) 03/10/2022    ALBUMIN 3.4 01/08/2021    ALKPHOS 98 09/02/2024    ALKPHOS 94 01/08/2021    ALT 20 09/02/2024    ALT 23 01/08/2021    AST 38 09/02/2024    AST 22 01/08/2021        INR RESULTS:  Lab Results   Component Value Date    INR 1.07 06/01/2008       No results found for: \"MAG\"  Lab Results   Component Value Date    NTBNPI 289 09/02/2024    NTBNPI 61 06/01/2008     Lab Results   Component Value Date    NTBNP 57 12/27/2024       Cardiac Diagnostics:    9/6/2024 Echo  Interpretation Summary     The left ventricle is normal in size.  The visual ejection fraction is 60-65%.  Diastolic Doppler findings (E/E' ratio and/or other parameters) suggest left  ventricular filling pressures are increased.  No regional wall motion abnormalities noted.  There is mild mitral stenosis.  There is sclerosis, calcification, and restriction of the aortic valve opening  compatible with trivial aortic stenosis.      Assessment and Plan:   Ms. Jackson is a 64 " year old female with medical history pertinent for COPD, T2DM, HTN, HLD, CAC on CT, mild PAH, tobacco use disorder (quit 9/2024), and HFpEF. Jerry presents to clinic for CORE follow up.     We spent time discussed HFpEF diagnosis, as well as goals of care/management. She is already on good GDMT. She was previously referred to sleep medicine. We further discussed reasoning for sleep apnea evaluation. Lesli is open to this, but is not ready to schedule an appointment. We also spent time discussing healthy weight, importance of increase activity, and good DM control. Her most recent A1c is 7%. Lesli states that she is not a diabetic. She was previously on Metformin. This was stopped at her request, but denies any intolerance or side effects. Her PCP will continue to monitor A1c. We also discussed GLP-1 but she is not agreeable to MTM referral or any type of injectable medication.     She is normotensive. Review of today's labs notable for hyperkalemia with K 5.6 as well as BREEZY with SCr 1.19, baseline 0.93. She reports that she eats a lot of bananas and potatoes. Thinks she drinks < 48 mL of fluid daily. For today we will hold spironolactone. Discussed that this will hopefully allow for an improvement in labs, but she may notice a bump in BP or possibly edema. She will monitor for this. Plan to repeat BMP in 1 week. Recommend increase water intake as well.     Referral to OBGYN given new vaginal bleeding.     # Chronic diastolic heart failure/HFpEF (EF 60-65%)  # Mild PAH, group II  Risk factors include female gender, age, HTN, diabetes, sleep apnea, and obesity. The mainstays of therapy for HFpEF include volume management, blood pressure control, treating underlying sleep apnea if present, treatment of underlying CAD if within the goals of care, weight management, exercise training, rate control for atrial fibrillation as well as consideration for a rhythm control strategy, and consideration for clinical trials.  Consideration of spironolactone in low risk patients should be taken given the positive HF results in the TOPCAT trial.  Stage C. NYHA Class III.    Primary Cardiologist: Dr. Haynes; Last seen 12/2/24  BP: controlled   Fluid status: Euvolemic, hydrochlorothiazide 25 mg daily  Aldosterone antagonist: HOLD spironolactone 25 mg daily   SGLT2i: jardiance 10 mg daily   ACEi/ARB/ARNI: n/a, no evidence for use in HFpEF, lisinopril 40 mg daily for HTN  BB: n/a, no evidence for use in HFpEF, coreg 6.25 mg BID for HTN  SCD prophylaxis: n/a, no evidence for use in HFpEF  NSAID use: contraindicated  Sleep apnea evaluation: Placed:  Snoring, referred to sleep medicine at previous cardiology appointment, encouraged to make appointment   Remote PA Pressure Monitoring (CardioMems) Deferred while medical therapy is optimized    # CAC (moderate) seen by CT  # HLD with LDL goal < 70  - LDL 70, at goal. Continue lovastatin 10 mg daily.     # HTN, controlled  - continue lisinopril, coreg, spironolactone, hctz    # T2DM  A1c 7% in October '24 while on metformin which was then discontinued per patient request. Presently only on jardiance 10 mg daily for HFpEF. Given HFpEF and moderate CAC would recommend tighter A1c.   - we will plan to repeat A1c in 1-2 months, but discussed that should A1c be >/=7, would recommend additional medication  - would consider endo referral     # Obesity   BMI ~ 37.61.    Increase in all-cause morbidity and mortality.   - Refer to MTM for evaluation of GLP-1     # Alcohol use Disorder  Patient reportedly consumes 3-4 drinks per day.    - Continue to encourage cessation    # Tobacco Use Disorder  - congratulated on ongoing cessation. Quit in Sept '24.     # Suspected asthma-COPD overlap syndrome  Follows with pulmonology   - discussed pulmonary rehab: doesn't feel that she has bandwidth right now given her work schedule  -continue Breztri  -started on fluticasone for nasal congestion with associated post-nasal  drip  -RTC 11/2025 with PFTs, discuss lung cancer screening at that time       Follow up:  - Dr. Haynes 6/9/25  - CORE in 6 months       A total of 40 minutes was spent on the day of the visit, which includes preparation for the visit (reviewing previous medical records, laboratories and investigations), in conjunction with the actual clinic visit with the patient, which includes obtaining a history and physical exam, creating and reviewing the care plan, patient education (and family if present), counseling, documenting clinical information in the electronic health record and care coordination.       Astrid Garcia DNP, NP-C  Advance Heart Failure  4/2/2025      Please do not hesitate to contact me if you have any questions/concerns.     Sincerely,     ALICE Mckinnon CNP

## 2025-04-02 NOTE — PATIENT INSTRUCTIONS
CORE: Cardiomyopathy, Optimization, Rehabilitation, Education      Take your medicines every day, as directed    Changes/Recommendations made today:  HOLD spironolactone given your elevated potassium and creatinine   Increase your water intake   Okay to try Claritin, Zytrec, or non-drowsy benadryl  once daily for allergies    Monitor Your Weight and Symptoms    Contact us if you:    Gain 2 pounds in one day or 5 pounds in one week  Feel more short of breath  Notice more leg swelling  Feel lightheadeded   Change your lifestyle    Limit Salt or Sodium:  2000 mg  Limit Fluids:  2000 mL or approximately 64 ounces  Eat a Heart Healthy Diet  Low in saturated fats  Stay Active:  Aim to move at least 150 minutes every  week         To Contact us    During Business Hours:  [phone], option # 1      After hours, weekends or holidays:   [phone], Option #4  Ask to speak to the On-Call Cardiologist. Inform them you are a CORE/heart failure patient at the [facility].     Use [employer] allows you to communicate directly with your heart team through secure messaging.  atokore can be accessed any time on your phone, computer, or tablet.  If you need assistance, we'd be happy to help!         Keep your Heart Appointments:    Referral to gynecology      Labs in 1 week to check your potassium and kidney function    Dr. Haynes 6/9/2025    CORE in 6 months

## 2025-04-10 ENCOUNTER — OFFICE VISIT (OUTPATIENT)
Dept: OBGYN | Facility: CLINIC | Age: 65
End: 2025-04-10
Attending: NURSE PRACTITIONER
Payer: COMMERCIAL

## 2025-04-10 VITALS
WEIGHT: 268.1 LBS | HEART RATE: 90 BPM | DIASTOLIC BLOOD PRESSURE: 72 MMHG | OXYGEN SATURATION: 96 % | HEIGHT: 70 IN | SYSTOLIC BLOOD PRESSURE: 120 MMHG | BODY MASS INDEX: 38.38 KG/M2

## 2025-04-10 DIAGNOSIS — N93.9 VAGINAL BLEEDING: ICD-10-CM

## 2025-04-10 DIAGNOSIS — N95.0 POSTMENOPAUSAL BLEEDING: Primary | ICD-10-CM

## 2025-04-10 NOTE — PATIENT INSTRUCTIONS
Endometrial Biopsy  Post-Procedure Patient Instructions      Please monitor for any of the following:      Fever   Cramping after 48 hours   Bleeding for 24-48 hours that is heavier than a normal period   Any bleeding that soaks more than 1 pad per hour      Please call your health care provider if you develop any of the above symptoms or problems.     [facility]   Nurse Triage Line [phone]      Use pads, not tampons, for the bleeding. You may resume sexual relations in 2-3 days after bleeding has stopped.

## 2025-04-10 NOTE — PROGRESS NOTES
GYN Clinic Note  Date: 4/10/2025    CC:  Postmenopausal Bleeding    HPI:  Lesli Jackson is a 64 year old female  presents for evaluation of postmenopausal bleeding as a referral from Gretchen Vargas. She is not sure when her periods stopped, per EPIC Patient's last menstrual period was 2012.     Bleeding on and off bleeding for a few years  No cramping or pain  Started to be more of a light flow for the past week, wearing a tampon  Mostly bright red    Her work-up thus far for her abnormal bleeding has included:  - TSH: none  - transvaginal ultrasound: none  - Hgb: none    GYN HISTORY:  Patient's last menstrual period was 2012.    STI history: none   Last Pap: 21 NIL neg HR HPV  History abnl paps: none  The patient is no sexually active    Patient has following risk factors for endometrial cancer:  Unopposed estrogen exposure: No  Obesity: Yes. Details: Body mass index is 38.47 kg/m .   Smoking: Yes. Details: quit in 2024. Smoked for 30yrs.  Nulliparity: Yes.   Early age of menarche / late age of menopause: No  Family history of colon cancer, ovarian cancer, and type I endometrial cancer: No    OBSTETRIC HISTORY:   OB History    Para Term  AB Living   1 0 0 0 1 0   SAB IAB Ectopic Multiple Live Births   0 1 0 0 0      # Outcome Date GA Lbr Bryn/2nd Weight Sex Type Anes PTL Lv   1 IAB                Past Medical History:   Diagnosis Date    Abnormal vaginal bleeding 11/10/2016    Baker's cyst of knee     Lt (7.3x4.3x1.6 cm)    Elevated glucose     Hyperlipidemia LDL goal <130     Hypertension goal BP (blood pressure) < 140/90     Hypoxia 2024    Idiopathic edema     NONSPECIFIC MEDICAL HISTORY     nephrolithiasis    Personal history of smoking     quit in     Secondary lymphedema 19    DX by [facility] [place], lose wt. at this point    Unspecified iridocyclitis        Past Surgical History:   Procedure Laterality Date    COLONOSCOPY  11    repeat 3  yrs, one inconclusive area       SOCIAL HISTORY:  Lives with partner Winston.  Feels safe at home.  Works as a   Drinks 2-4oz alcohol daily    Social History     Tobacco Use    Smoking status: Former     Current packs/day: 0.00     Average packs/day: 1 pack/day for 34.7 years (34.7 ttl pk-yrs)     Types: Cigarettes     Start date: 1990     Quit date: 2024     Years since quittin.5    Smokeless tobacco: Former    Tobacco comments:     5 cigarettes day or less   Vaping Use    Vaping status: Never Used   Substance Use Topics    Alcohol use: Yes     Comment: 1-2  beer daily on average    Drug use: No          Family History   Problem Relation Age of Onset    C.A.D. Father     Diabetes Father     Cerebrovascular Disease Father         stroke    Eye Disorder Father         cataracts    Heart Disease Father     Hypertension Mother     Arthritis Mother     Cancer Mother         lymphoma    Abdominal Aortic Aneurysm Brother 63        nonsmoker    Prostate Cancer Maternal Uncle     Allergies Sister     Eye Disorder Other         iritis    Lipids Other         one of her parents    Thyroid Disease Sister        Current Outpatient Medications   Medication Sig Dispense Refill    albuterol (PROAIR HFA/PROVENTIL HFA/VENTOLIN HFA) 108 (90 Base) MCG/ACT inhaler Inhale 2 puffs into the lungs every 6 hours as needed for shortness of breath, wheezing or cough. 18 g 0    budeson-glycopyrrol-formoterol (BREZTRI AEROSPHERE) 160-9-4.8 MCG/ACT AERO inhaler Inhale 2 puffs into the lungs 2 times daily. 5.9 g 11    carvedilol (COREG) 6.25 MG tablet TAKE 1 TABLET(6.25 MG) BY MOUTH TWICE DAILY WITH MEALS 60 tablet 11    empagliflozin (JARDIANCE) 10 MG TABS tablet Take 1 tablet (10 mg) by mouth daily. 90 tablet 3    Flaxseed, Linseed, 1000 MG CAPS Take 6 capsules by mouth daily.      fluticasone (FLONASE) 50 MCG/ACT nasal spray Spray 1 spray into both nostrils daily. 15.8 mL 5    hydroCHLOROthiazide (HYDRODIURIL) 25 MG  "tablet Take 1 tablet (25 mg) by mouth daily. 90 tablet 3    lisinopril (ZESTRIL) 40 MG tablet Take 1 tablet (40 mg) by mouth daily. 45 tablet 11    lovastatin (MEVACOR) 20 MG tablet Take 0.5 tablets (10 mg) by mouth at bedtime. 45 tablet 1    methylcellulose (CITRUCEL) powder Take 0.85 g (3 teaspoonful) by mouth daily      MULTIPLE VIT-MIN-CALCIUM-FA PO Take 1 tablet by mouth daily.      spironolactone (ALDACTONE) 25 MG tablet Take 1 tablet (25 mg) by mouth daily. (Patient not taking: Reported on 4/10/2025) 90 tablet 3       Allergies: Dust mites and Amoxicillin    ROS:  C: NEGATIVE for fever, chills, change in weight  I: NEGATIVE for worrisome rashes, moles or lesions  E: NEGATIVE for vision changes or irritation  E/M: NEGATIVE for ear, mouth and throat problems  R: NEGATIVE for significant cough or SOB  CV: NEGATIVE for chest pain, palpitations or peripheral edema  GI: NEGATIVE for nausea, abdominal pain, heartburn, or change in bowel habits  : NEGATIVE for frequency, dysuria, hematuria, vaginal discharge, or irregular bleeding  M: NEGATIVE for significant arthralgias or myalgia  N: NEGATIVE for weakness, dizziness or paresthesias  E: NEGATIVE for temperature intolerance, skin/hair changes  P: NEGATIVE for changes in mood or affect    EXAM:  Blood pressure 120/72, pulse 90, height 1.778 m (5' 10\"), weight 121.6 kg (268 lb 1.6 oz), last menstrual period 12/22/2012, SpO2 96%, not currently breastfeeding.   BMI= Body mass index is 38.47 kg/m .  General - pleasant female in no acute distress.  Abdomen - soft, nontender, nondistended  Pelvic - external genitalia: normal adult female without lesions or abnormalities; BUS: within normal limits; Vagina: well rugated, no discharge, no lesions; Cervix: no lesions or CMT; Uterus:anteverted, 6 week sized, mobile and nontender; Adnexae: no masses or tenderness.  Rectovaginal - deferred.  Neurological - normal strength, sensation, and mental status.      ASSESSMENT:  Lesli DASILVA" Renetta is a 64 year old  who presents with postmenopausal bleeding.  Discussed endometrial biopsy verus pelvic ultrasound for initial evaluation.  Patient declines EMB in clinic today.  Will start with pelvic US.  If endometrium thickened, discussed I will recommend hysteroscopy D&C.  Discussed differential diagnosis.   Discussed importance of ruling out hyperplasia or malignancy.   Pt instructed to schedule pelvic US and follow up after.     PLAN:  1. Postmenopausal bleeding (Primary)    - US Transvaginal Pelvic Non-OB; Future  - [place] PELVIC EXAMINATION  - [place] PELVIC EXAMINATION    2. Vaginal bleeding    - Ob/Gyn  Referral      Milly Matt MD

## 2025-04-12 ENCOUNTER — LAB (OUTPATIENT)
Dept: LAB | Facility: CLINIC | Age: 65
End: 2025-04-12
Payer: COMMERCIAL

## 2025-04-12 DIAGNOSIS — I50.30 HEART FAILURE WITH PRESERVED EJECTION FRACTION, NYHA CLASS II (H): ICD-10-CM

## 2025-04-12 PROCEDURE — 36415 COLL VENOUS BLD VENIPUNCTURE: CPT

## 2025-04-12 PROCEDURE — 80048 BASIC METABOLIC PNL TOTAL CA: CPT

## 2025-04-13 LAB
ANION GAP SERPL CALCULATED.3IONS-SCNC: 10 MMOL/L (ref 7–15)
BUN SERPL-MCNC: 31.6 MG/DL (ref 8–23)
CALCIUM SERPL-MCNC: 9.8 MG/DL (ref 8.8–10.4)
CHLORIDE SERPL-SCNC: 97 MMOL/L (ref 98–107)
CREAT SERPL-MCNC: 1.13 MG/DL (ref 0.51–0.95)
EGFRCR SERPLBLD CKD-EPI 2021: 54 ML/MIN/1.73M2
GLUCOSE SERPL-MCNC: 190 MG/DL (ref 70–99)
HCO3 SERPL-SCNC: 29 MMOL/L (ref 22–29)
POTASSIUM SERPL-SCNC: 5.4 MMOL/L (ref 3.4–5.3)
SODIUM SERPL-SCNC: 136 MMOL/L (ref 135–145)

## 2025-04-15 ENCOUNTER — TELEPHONE (OUTPATIENT)
Dept: CARDIOLOGY | Facility: CLINIC | Age: 65
End: 2025-04-15
Payer: COMMERCIAL

## 2025-04-15 ENCOUNTER — MYC MEDICAL ADVICE (OUTPATIENT)
Dept: CARDIOLOGY | Facility: CLINIC | Age: 65
End: 2025-04-15
Payer: COMMERCIAL

## 2025-04-15 DIAGNOSIS — I50.30 HEART FAILURE WITH PRESERVED EJECTION FRACTION, NYHA CLASS II (H): Primary | ICD-10-CM

## 2025-04-15 NOTE — TELEPHONE ENCOUNTER
Lseli called back. She switched from bananas to V8 in the morning, she has also decreased the number of bananas in her diet already.      I've sent her some more information on Potassium rich and low foods.

## 2025-04-15 NOTE — TELEPHONE ENCOUNTER
Left generic message for Lesli and sent mychart.     Date: 4/15/2025    Time of Call: 8:51 AM     Diagnosis:  hyperkalemia      [ VORB ] Ordering provider: Astrid Garcia NP    Order: BMP in 3 weeks     Order received by: Meri Florez RN     Follow-up/additional notes: mychart sent to Lesli        ----- Message from Astrid Garcia sent at 4/14/2025  4:45 PM CDT -----  K still borderline high after stopping spironolactone. I don't really want to make any med changes at the moment. I'd remind her to reduce high potassium foods (she was eating a lot of bananas/potatoes) and lets repeat BMP in 3 weeks. Thanks!

## 2025-04-17 ENCOUNTER — ANCILLARY PROCEDURE (OUTPATIENT)
Dept: ULTRASOUND IMAGING | Facility: CLINIC | Age: 65
End: 2025-04-17
Attending: OBSTETRICS & GYNECOLOGY
Payer: COMMERCIAL

## 2025-04-17 DIAGNOSIS — N95.0 POSTMENOPAUSAL BLEEDING: ICD-10-CM

## 2025-04-17 PROCEDURE — 76830 TRANSVAGINAL US NON-OB: CPT | Performed by: OBSTETRICS & GYNECOLOGY

## 2025-04-21 ENCOUNTER — VIRTUAL VISIT (OUTPATIENT)
Dept: OBGYN | Facility: CLINIC | Age: 65
End: 2025-04-21
Payer: COMMERCIAL

## 2025-04-21 DIAGNOSIS — N95.0 POSTMENOPAUSAL BLEEDING: ICD-10-CM

## 2025-04-21 DIAGNOSIS — R18.8 PELVIC FLUID COLLECTION: Primary | ICD-10-CM

## 2025-04-21 DIAGNOSIS — R93.89 THICKENED ENDOMETRIUM: ICD-10-CM

## 2025-04-21 PROCEDURE — 98005 SYNCH AUDIO-VIDEO EST LOW 20: CPT | Performed by: OBSTETRICS & GYNECOLOGY

## 2025-04-21 NOTE — PROGRESS NOTES
Lesli is a 64 year old who is being evaluated via a billable video visit.    How would you like to obtain your AVS? Mashed Pixelhart  If the video visit is dropped, the invitation should be resent by: Text to cell phone: [phone]  Will anyone else be joining your video visit? No      Assessment & Plan   64 year old  with postmenopausal bleeding, thickened endometrium on ultrasound with findings concerning for polyps. Recommend hysteroscopic resection.  Discussed fluid collection in posterior cul de sac. Recommend CT abd/pelvis to get more information and rule out left ovarian cyst.    Pelvic fluid collection    - CT Abdomen Pelvis w Contrast; Future    Thickened endometrium    - Case Request: HYSTEROSCOPY, WITH DILATION AND CURETTAGE OF UTERUS USING MORCELLATOR    Postmenopausal bleeding    - Case Request: HYSTEROSCOPY, WITH DILATION AND CURETTAGE OF UTERUS USING MORCELLATOR      Milly Matt MD       Subjective   Lesli is a 64 year old, presenting for the following health issues:  Ultrasound Follow up    HPI      Lesli Jackson is a 64 year old  postmenopausal pt who I saw 4/10 for initial eval of postmenopausal bleeding.   Bleeding on and off bleeding for a few years  No cramping or pain  Started to be more of a light flow for the past week, wearing a tampon  Mostly bright red        Objective           Vitals:  No vitals were obtained today due to virtual visit.    Physical Exam   GENERAL: alert and no distress  EYES: Eyes grossly normal to inspection.  No discharge or erythema, or obvious scleral/conjunctival abnormalities.  RESP: No audible wheeze, cough, or visible cyanosis.    SKIN: Visible skin clear. No significant rash, abnormal pigmentation or lesions.  NEURO: Cranial nerves grossly intact.  Mentation and speech appropriate for age.  PSYCH: Appropriate affect, tone, and pace of words        Gynecological Ultrasonography:   Uterus: anteverted to midline . Contour is smooth/regular.  Size: 6.45 x  3.06 x 3.93 cm  Endometrium: Thickness Total 8.99 mm  Findings: Heterogeneous thickened endo lining.  There is a tiny echogenic focus measuring 0.35 x 0.33 x 0.46 cm.  There is suspicion for 2 polyps 0.86 x 0.90 x 0.80 cm and 0.77 x 0.60 x 0.60 cm  Right Ovary: 1.20 x 1.38 x 0.91 cm. Wnl  Left Ovary: Not visualized.  There is a fluid collection in the PCDS that appears more encapsulated then free fluid.  It contains anechoic fluid along with a thick septation.   Cul de Sac Free Fluid: No free fluid  Technique: Transvaginal Imaging performed  3D imaging performed     Impression:   There is a collection of fluid in the posterior cul-de-sac that appears more encapsulated with a thick septation.  No normal ovarian tissue observed on the left side. The endometrium appeared to contain 2 small polyps that are likely the cause of the postmenopausal bleeding.  Recommend hysteroscopic removal.        Video-Visit Details    Type of service:  Video Visit   Originating Location (pt. Location): Home    Distant Location (provider location):  On-site    Joined the call at 4/21/2025, 3:00:01 pm.  Left the call at 4/21/2025, 3:14:36 pm.  You were on the call for 14 minutes 35 seconds.  Platform used for Video Visit: Estefani  Signed Electronically by: Milly Matt MD

## 2025-04-22 ENCOUNTER — TELEPHONE (OUTPATIENT)
Dept: OBGYN | Facility: CLINIC | Age: 65
End: 2025-04-22
Payer: COMMERCIAL

## 2025-04-22 NOTE — TELEPHONE ENCOUNTER
Type of surgery: gyn  Location of surgery: [facility] OR  Date and time of surgery: 06/06/25 2:10PM  Surgeon: Vernell  Pre-Op Appt Date: 06/04/25 Roby Bunch  Post-Op Appt Date: 06/12/25    Packet sent out: Yes ([facility])  Pre-cert/Authorization completed:  Not Applicable  Date: 04/22/25        Bernie  She/her/hers  [facility]

## 2025-04-29 ENCOUNTER — HOSPITAL ENCOUNTER (OUTPATIENT)
Dept: CT IMAGING | Facility: CLINIC | Age: 65
Discharge: HOME | End: 2025-04-29
Attending: OBSTETRICS & GYNECOLOGY | Admitting: OBSTETRICS & GYNECOLOGY
Payer: COMMERCIAL

## 2025-04-29 DIAGNOSIS — R18.8 PELVIC FLUID COLLECTION: ICD-10-CM

## 2025-04-29 PROCEDURE — 74177 CT ABD & PELVIS W/CONTRAST: CPT | Mod: 26 | Performed by: RADIOLOGY

## 2025-04-29 PROCEDURE — 250N000011 HC RX IP 250 OP 636: Performed by: OBSTETRICS & GYNECOLOGY

## 2025-04-29 PROCEDURE — 250N000009 HC RX 250: Performed by: OBSTETRICS & GYNECOLOGY

## 2025-04-29 PROCEDURE — 74177 CT ABD & PELVIS W/CONTRAST: CPT

## 2025-04-29 RX ORDER — IOPAMIDOL 755 MG/ML
100 INJECTION, SOLUTION INTRAVASCULAR ONCE
Status: COMPLETED | OUTPATIENT
Start: 2025-04-29 | End: 2025-04-29

## 2025-04-29 RX ADMIN — IOPAMIDOL 132 ML: 755 INJECTION, SOLUTION INTRAVENOUS at 15:57

## 2025-04-29 RX ADMIN — SODIUM CHLORIDE 66 ML: 9 INJECTION, SOLUTION INTRAVENOUS at 15:56

## 2025-05-03 ENCOUNTER — MYC MEDICAL ADVICE (OUTPATIENT)
Dept: CARDIOLOGY | Facility: CLINIC | Age: 65
End: 2025-05-03
Payer: COMMERCIAL

## 2025-05-03 ENCOUNTER — HEALTH MAINTENANCE LETTER (OUTPATIENT)
Age: 65
End: 2025-05-03

## 2025-05-03 DIAGNOSIS — I50.30 HEART FAILURE WITH PRESERVED EJECTION FRACTION, NYHA CLASS II (H): Primary | ICD-10-CM

## 2025-05-03 DIAGNOSIS — R60.0 BILATERAL LOWER EXTREMITY EDEMA: ICD-10-CM

## 2025-05-06 ENCOUNTER — LAB (OUTPATIENT)
Dept: LAB | Facility: CLINIC | Age: 65
End: 2025-05-06
Payer: COMMERCIAL

## 2025-05-06 DIAGNOSIS — I50.30 HEART FAILURE WITH PRESERVED EJECTION FRACTION, NYHA CLASS II (H): ICD-10-CM

## 2025-05-06 DIAGNOSIS — E11.9 TYPE 2 DIABETES MELLITUS WITHOUT COMPLICATION, WITHOUT LONG-TERM CURRENT USE OF INSULIN (H): Primary | ICD-10-CM

## 2025-05-06 LAB
ANION GAP SERPL CALCULATED.3IONS-SCNC: 12 MMOL/L (ref 7–15)
BUN SERPL-MCNC: 20.4 MG/DL (ref 8–23)
CALCIUM SERPL-MCNC: 9.4 MG/DL (ref 8.8–10.4)
CHLORIDE SERPL-SCNC: 99 MMOL/L (ref 98–107)
CREAT SERPL-MCNC: 0.99 MG/DL (ref 0.51–0.95)
EGFRCR SERPLBLD CKD-EPI 2021: 63 ML/MIN/1.73M2
GLUCOSE SERPL-MCNC: 153 MG/DL (ref 70–99)
HCO3 SERPL-SCNC: 28 MMOL/L (ref 22–29)
POTASSIUM SERPL-SCNC: 3.8 MMOL/L (ref 3.4–5.3)
SODIUM SERPL-SCNC: 139 MMOL/L (ref 135–145)

## 2025-05-06 PROCEDURE — 36415 COLL VENOUS BLD VENIPUNCTURE: CPT

## 2025-05-06 PROCEDURE — 80048 BASIC METABOLIC PNL TOTAL CA: CPT

## 2025-05-07 ENCOUNTER — RESULTS FOLLOW-UP (OUTPATIENT)
Dept: CARDIOLOGY | Facility: CLINIC | Age: 65
End: 2025-05-07

## 2025-05-07 RX ORDER — SPIRONOLACTONE 25 MG/1
12.5 TABLET ORAL DAILY
Qty: 45 TABLET | Refills: 3 | Status: SHIPPED | OUTPATIENT
Start: 2025-05-07

## 2025-05-07 NOTE — TELEPHONE ENCOUNTER
Date: 5/7/2025    Time of Call: 12:53 PM     Diagnosis:  heart failure      [ VORB ] Ordering provider: Astrid Garcia NP    Order: restart spironolactone 12.5 mg daily, BMP next week      Order received by: Meri Florez RN       Follow-up/additional notes:

## 2025-05-07 NOTE — TELEPHONE ENCOUNTER
Date: 5/7/2025    Time of Call: 11:18 AM     Diagnosis:  heart failure/hyperkalemia      [ VORB ] Ordering provider: Astrid Garcia NP    Order: BMP in June      Order received by: Meri Florez RN       Follow-up/additional notes:

## 2025-05-14 ENCOUNTER — MYC MEDICAL ADVICE (OUTPATIENT)
Dept: CARDIOLOGY | Facility: CLINIC | Age: 65
End: 2025-05-14
Payer: COMMERCIAL

## 2025-05-14 DIAGNOSIS — I50.30 HEART FAILURE WITH PRESERVED EJECTION FRACTION, NYHA CLASS II (H): Primary | ICD-10-CM

## 2025-05-15 RX ORDER — FUROSEMIDE 20 MG/1
20 TABLET ORAL DAILY PRN
Qty: 30 TABLET | Refills: 1 | Status: SHIPPED | OUTPATIENT
Start: 2025-05-15

## 2025-05-15 NOTE — TELEPHONE ENCOUNTER
Date: 5/15/2025    Time of Call: 10:46 AM     Diagnosis:  heart failure      [ VORB ] Ordering provider: Astrid Garcia NP    Order: lasix 20 mg daily for 3 days. Labs as scheduled tomorrow, further diuresis and plan TBD after lab results      Order received by: Meri Florez RN       Follow-up/additional notes:

## 2025-06-04 ENCOUNTER — OFFICE VISIT (OUTPATIENT)
Dept: FAMILY MEDICINE | Facility: CLINIC | Age: 65
End: 2025-06-04
Payer: COMMERCIAL

## 2025-06-04 VITALS
HEART RATE: 76 BPM | SYSTOLIC BLOOD PRESSURE: 103 MMHG | HEIGHT: 70 IN | WEIGHT: 268 LBS | TEMPERATURE: 97.2 F | OXYGEN SATURATION: 96 % | RESPIRATION RATE: 20 BRPM | DIASTOLIC BLOOD PRESSURE: 69 MMHG | BODY MASS INDEX: 38.37 KG/M2

## 2025-06-04 DIAGNOSIS — I50.9 ACUTE HEART FAILURE, UNSPECIFIED HEART FAILURE TYPE (H): ICD-10-CM

## 2025-06-04 DIAGNOSIS — E66.01 SEVERE OBESITY (BMI 35.0-35.9 WITH COMORBIDITY) (H): ICD-10-CM

## 2025-06-04 DIAGNOSIS — Z01.818 PREOP GENERAL PHYSICAL EXAM: Primary | ICD-10-CM

## 2025-06-04 DIAGNOSIS — Z12.31 VISIT FOR SCREENING MAMMOGRAM: ICD-10-CM

## 2025-06-04 DIAGNOSIS — E11.9 TYPE 2 DIABETES MELLITUS WITHOUT COMPLICATION, WITHOUT LONG-TERM CURRENT USE OF INSULIN (H): ICD-10-CM

## 2025-06-04 DIAGNOSIS — J44.9 CHRONIC OBSTRUCTIVE PULMONARY DISEASE, UNSPECIFIED COPD TYPE (H): ICD-10-CM

## 2025-06-04 DIAGNOSIS — I10 ESSENTIAL HYPERTENSION WITH GOAL BLOOD PRESSURE LESS THAN 140/90: ICD-10-CM

## 2025-06-04 DIAGNOSIS — N95.0 POSTMENOPAUSAL BLEEDING: ICD-10-CM

## 2025-06-04 DIAGNOSIS — N84.0 ENDOMETRIAL POLYP: ICD-10-CM

## 2025-06-04 DIAGNOSIS — Z12.11 SCREEN FOR COLON CANCER: ICD-10-CM

## 2025-06-04 DIAGNOSIS — R93.89 THICKENED ENDOMETRIUM: ICD-10-CM

## 2025-06-04 LAB
ANION GAP SERPL CALCULATED.3IONS-SCNC: 10 MMOL/L (ref 7–15)
BUN SERPL-MCNC: 36.1 MG/DL (ref 8–23)
CALCIUM SERPL-MCNC: 9.7 MG/DL (ref 8.8–10.4)
CHLORIDE SERPL-SCNC: 100 MMOL/L (ref 98–107)
CREAT SERPL-MCNC: 1.28 MG/DL (ref 0.51–0.95)
CREAT UR-MCNC: 84 MG/DL
EGFRCR SERPLBLD CKD-EPI 2021: 47 ML/MIN/1.73M2
ERYTHROCYTE [DISTWIDTH] IN BLOOD BY AUTOMATED COUNT: 13.1 % (ref 10–15)
EST. AVERAGE GLUCOSE BLD GHB EST-MCNC: 160 MG/DL
GLUCOSE SERPL-MCNC: 174 MG/DL (ref 70–99)
HBA1C MFR BLD: 7.2 % (ref 0–5.6)
HCO3 SERPL-SCNC: 23 MMOL/L (ref 22–29)
HCT VFR BLD AUTO: 40.6 % (ref 35–47)
HGB BLD-MCNC: 13.1 G/DL (ref 11.7–15.7)
HOLD SPECIMEN: NORMAL
MCH RBC QN AUTO: 30.4 PG (ref 26.5–33)
MCHC RBC AUTO-ENTMCNC: 32.3 G/DL (ref 31.5–36.5)
MCV RBC AUTO: 94 FL (ref 78–100)
MICROALBUMIN UR-MCNC: <12 MG/L
MICROALBUMIN/CREAT UR: NORMAL MG/G{CREAT}
PLATELET # BLD AUTO: 223 10E3/UL (ref 150–450)
POTASSIUM SERPL-SCNC: 5.7 MMOL/L (ref 3.4–5.3)
RBC # BLD AUTO: 4.31 10E6/UL (ref 3.8–5.2)
SODIUM SERPL-SCNC: 133 MMOL/L (ref 135–145)
WBC # BLD AUTO: 9.8 10E3/UL (ref 4–11)

## 2025-06-04 PROCEDURE — 36415 COLL VENOUS BLD VENIPUNCTURE: CPT | Performed by: STUDENT IN AN ORGANIZED HEALTH CARE EDUCATION/TRAINING PROGRAM

## 2025-06-04 PROCEDURE — 85027 COMPLETE CBC AUTOMATED: CPT | Performed by: STUDENT IN AN ORGANIZED HEALTH CARE EDUCATION/TRAINING PROGRAM

## 2025-06-04 PROCEDURE — 99214 OFFICE O/P EST MOD 30 MIN: CPT | Performed by: STUDENT IN AN ORGANIZED HEALTH CARE EDUCATION/TRAINING PROGRAM

## 2025-06-04 PROCEDURE — 80048 BASIC METABOLIC PNL TOTAL CA: CPT | Performed by: STUDENT IN AN ORGANIZED HEALTH CARE EDUCATION/TRAINING PROGRAM

## 2025-06-04 PROCEDURE — 82570 ASSAY OF URINE CREATININE: CPT | Performed by: STUDENT IN AN ORGANIZED HEALTH CARE EDUCATION/TRAINING PROGRAM

## 2025-06-04 PROCEDURE — 3078F DIAST BP <80 MM HG: CPT | Performed by: STUDENT IN AN ORGANIZED HEALTH CARE EDUCATION/TRAINING PROGRAM

## 2025-06-04 PROCEDURE — 82043 UR ALBUMIN QUANTITATIVE: CPT | Performed by: STUDENT IN AN ORGANIZED HEALTH CARE EDUCATION/TRAINING PROGRAM

## 2025-06-04 PROCEDURE — 83036 HEMOGLOBIN GLYCOSYLATED A1C: CPT | Performed by: STUDENT IN AN ORGANIZED HEALTH CARE EDUCATION/TRAINING PROGRAM

## 2025-06-04 PROCEDURE — 3074F SYST BP LT 130 MM HG: CPT | Performed by: STUDENT IN AN ORGANIZED HEALTH CARE EDUCATION/TRAINING PROGRAM

## 2025-06-04 ASSESSMENT — ASTHMA QUESTIONNAIRES
QUESTION_1 LAST FOUR WEEKS HOW MUCH OF THE TIME DID YOUR ASTHMA KEEP YOU FROM GETTING AS MUCH DONE AT WORK, SCHOOL OR AT HOME: NONE OF THE TIME
QUESTION_5 LAST FOUR WEEKS HOW WOULD YOU RATE YOUR ASTHMA CONTROL: COMPLETELY CONTROLLED
ACT_TOTALSCORE: 22
QUESTION_2 LAST FOUR WEEKS HOW OFTEN HAVE YOU HAD SHORTNESS OF BREATH: NOT AT ALL
QUESTION_3 LAST FOUR WEEKS HOW OFTEN DID YOUR ASTHMA SYMPTOMS (WHEEZING, COUGHING, SHORTNESS OF BREATH, CHEST TIGHTNESS OR PAIN) WAKE YOU UP AT NIGHT OR EARLIER THAN USUAL IN THE MORNING: NOT AT ALL
QUESTION_4 LAST FOUR WEEKS HOW OFTEN HAVE YOU USED YOUR RESCUE INHALER OR NEBULIZER MEDICATION (SUCH AS ALBUTEROL): ONE OR TWO TIMES PER DAY

## 2025-06-04 NOTE — PROGRESS NOTES
Preoperative Evaluation  [address]  [address]  [place] [address]  Phone: [phone]  Fax: [phone]  Primary Provider: Gretchen Vargas MD  Pre-op Performing Provider: Roby Bunch PA-C  Jun 4, 2025 6/2/2025   Surgical Information   What procedure is being done? Hysteroscopy, with dilation and curettage of uterus using morcellator   Facility or Hospital where procedure/surgery will be performed: [facility]   Who is doing the procedure / surgery? Dr. Matt   Date of surgery / procedure: 6/6/25   Time of surgery / procedure: 7:45 am   Where do you plan to recover after surgery? at home with family     Fax number for surgical facility: Note does not need to be faxed, will be available electronically in Epic.    Assessment & Plan     The proposed surgical procedure is considered INTERMEDIATE risk.    Preop general physical exam  Thickened endometrium  Endometrial polyp  Postmenopausal bleeding  Type 2 diabetes mellitus without complication, without long-term current use of insulin (H)  Essential hypertension with goal blood pressure less than 140/90  Chronic obstructive pulmonary disease, unspecified COPD type (H)  Acute heart failure, unspecified heart failure type (H)  Mild CAC on CT scan  Severe obesity (BMI 35.0-35.9 with comorbidity) (H)  BP well controlled today at 103/69. Breathing stable with normal cardiopulmonary exam today outside of mild non-pitting edema of ankle/foot/distal lower extremity that patient states is her baseline with no evidence of fluid overload. Diabetes was last checked 9 months ago with A1c of 7.1% which I would like to see a little lower but certainly wouldn't prevent surgery if around the same today. HF well controlled with regimen, did discuss talking with cardiology/PCP about lasix given mild swelling and history of HF. Mild CAC but not on aspirin per cardiology and no angina or other worrisome symptoms.  Below labs ordered with above chronic conditions. No EKG indicated with no history of heart disease, arrhythmia, insulin dependent diabetes, CKD, or PAD.       - Basic metabolic panel; Future  - Hemoglobin A1c; Future  - Albumin Random Urine Quantitative with Creat Ratio; Future  - CBC with Platelets and Reflex to Iron Studies; Future    Screen for colon cancer  Discussed with patient, but she would like to hold off and discuss with Dr. Vargas.     Visit for screening mammogram  Discussed with patient - mammo ordered given last screening was May 2023.    - MA Screening Bilateral w/ Breezy; Future        - No identified additional risk factors other than previously addressed above.     Antiplatelet or Anticoagulation Medication Instructions   - We reviewed the medication list and the patient is not on an antiplatelet or anticoagulation medications.    Additional Medication Instructions  Take all scheduled medications on the day of surgery EXCEPT for modifications listed below:  - Herbal medications and vitamins: DO NOT TAKE 14 days prior to surgery including multi-vitamin, flaxseed, and multivitamin.   - Jardiance: HOLD 3 days prior to surgery.   - Lasix: I know you're not taking this right now but do make sure you HOLD day of surgery.   - Aldactone: HOLD day of surgery.     Recommendation  Approval given to proceed with proposed procedure pending review of diagnostic evaluation.        Riccardo Ballesteros is a 64 year old, presenting for the following:  Pre-Op Exam          6/4/2025     8:12 AM   Additional Questions   Roomed by gulshan   Accompanied by self     HPI: Patient scheduled to undergo hysteroscopic resection with Dr. Matt.          6/2/2025   Pre-Op Questionnaire   Have you ever had a heart attack or stroke? No   Have you ever had surgery on your heart or blood vessels, such as a stent placement, a coronary artery bypass, or surgery on an artery in your head, neck, heart, or legs? No   Do you have chest pain  with activity? No   Do you have a history of heart failure? No   Do you currently have a cold, bronchitis or symptoms of other infection? No   Do you have a cough, shortness of breath, or wheezing? No   Do you or anyone in your family have previous history of blood clots? No   Do you or does anyone in your family have a serious bleeding problem such as prolonged bleeding following surgeries or cuts? No   Have you ever had problems with anemia or been told to take iron pills? No   Have you had any abnormal blood loss such as black, tarry or bloody stools, or abnormal vaginal bleeding? (!) YES, no bleeding other than vaginal bleeding.    Have you ever had a blood transfusion? No   Are you willing to have a blood transfusion if it is medically needed before, during, or after your surgery? Yes   Have you or any of your relatives ever had problems with anesthesia? No   Do you have sleep apnea, excessive snoring or daytime drowsiness? No   Do you have any artifical heart valves or other implanted medical devices like a pacemaker, defibrillator, or continuous glucose monitor? No   Do you have artificial joints? No   Are you allergic to latex? No     Advance Care Planning    Health Care Directive received at today's visit.  Forwarded to [facility]    Preoperative Review of    reviewed - no record of controlled substances prescribed.          Patient Active Problem List    Diagnosis Date Noted    Severe obesity (BMI 35.0-35.9 with comorbidity) (H) 11/16/2018     Priority: Medium    Advance care planning 06/08/2017     Priority: Medium    Type 2 diabetes mellitus without complication, without long-term current use of insulin (H) 06/07/2017     Priority: Medium    High blood triglycerides 06/07/2017     Priority: Medium    Essential hypertension with goal blood pressure less than 140/90 07/08/2016     Priority: Medium    Swelling of limb 07/08/2016     Priority: Medium    Localized swelling, mass and lump, neck  07/08/2016     Priority: Medium    Tinea corporis 07/08/2016     Priority: Medium    Morbid obesity due to excess calories (H) 07/08/2016     Priority: Medium    Obesity, Class I, BMI 30.0-34.9 (see actual BMI) 06/23/2015     Priority: Medium    Benign neoplasm of skin of trunk, except scrotum 06/23/2015     Priority: Medium    Skin tag 06/23/2015     Priority: Medium    Nevus 06/04/2014     Priority: Medium     Do you wish to do the replacement in the background? yes        Idiopathic edema      Priority: Medium    Hyperlipidemia LDL goal <100 05/09/2010     Priority: Medium    Other specified idiopathic peripheral neuropathy 12/11/2007     Priority: Medium      Past Medical History:   Diagnosis Date    Abnormal vaginal bleeding 11/10/2016    Baker's cyst of knee     Lt (7.3x4.3x1.6 cm)    Elevated glucose     Hyperlipidemia LDL goal <130     Hypertension goal BP (blood pressure) < 140/90     Hypoxia 09/02/2024    Idiopathic edema     NONSPECIFIC MEDICAL HISTORY     nephrolithiasis    Personal history of smoking     quit in 2010    Secondary lymphedema 8/4/19    DX by [facility], lose wt. at this point    Unspecified iridocyclitis      Past Surgical History:   Procedure Laterality Date    COLONOSCOPY  2/16/11    repeat 3 yrs, one inconclusive area     Current Outpatient Medications   Medication Sig Dispense Refill    albuterol (PROAIR HFA/PROVENTIL HFA/VENTOLIN HFA) 108 (90 Base) MCG/ACT inhaler Inhale 2 puffs into the lungs every 6 hours as needed for shortness of breath, wheezing or cough. 18 g 0    budeson-glycopyrrol-formoterol (BREZTRI AEROSPHERE) 160-9-4.8 MCG/ACT AERO inhaler Inhale 2 puffs into the lungs 2 times daily. 5.9 g 11    carvedilol (COREG) 6.25 MG tablet TAKE 1 TABLET(6.25 MG) BY MOUTH TWICE DAILY WITH MEALS 60 tablet 11    empagliflozin (JARDIANCE) 10 MG TABS tablet Take 1 tablet (10 mg) by mouth daily. 90 tablet 3    Flaxseed, Linseed, 1000 MG CAPS Take 6 capsules by mouth daily.       "fluticasone (FLONASE) 50 MCG/ACT nasal spray Spray 1 spray into both nostrils daily. 15.8 mL 5    hydroCHLOROthiazide (HYDRODIURIL) 25 MG tablet Take 1 tablet (25 mg) by mouth daily. 90 tablet 3    lisinopril (ZESTRIL) 40 MG tablet Take 1 tablet (40 mg) by mouth daily. 45 tablet 11    lovastatin (MEVACOR) 20 MG tablet Take 0.5 tablets (10 mg) by mouth at bedtime. 45 tablet 1    methylcellulose (CITRUCEL) powder Take 0.85 g (3 teaspoonful) by mouth daily      MULTIPLE VIT-MIN-CALCIUM-FA PO Take 1 tablet by mouth daily.      furosemide (LASIX) 20 MG tablet Take 1 tablet (20 mg) by mouth daily as needed (weight gain, swelling as directed by cardiology clinic). 30 tablet 1    spironolactone (ALDACTONE) 25 MG tablet Take 1 tablet (25 mg) by mouth daily. (Patient not taking: Reported on 2025) 90 tablet 3       Allergies   Allergen Reactions    Dust Mites     Amoxicillin Rash        Social History     Tobacco Use    Smoking status: Former     Current packs/day: 0.00     Average packs/day: 1 pack/day for 34.7 years (34.7 ttl pk-yrs)     Types: Cigarettes     Start date: 1990     Quit date: 2024     Years since quittin.7    Smokeless tobacco: Former    Tobacco comments:     5 cigarettes day or less   Substance Use Topics    Alcohol use: Yes     Comment: 1-2  beer daily on average       History   Drug Use No               Objective    /69   Pulse 76   Temp 97.2  F (36.2  C) (Temporal)   Resp 20   Ht 1.78 m (5' 10.08\")   Wt 121.6 kg (268 lb)   LMP 2012   SpO2 96%   BMI 38.37 kg/m     Estimated body mass index is 38.37 kg/m  as calculated from the following:    Height as of this encounter: 1.78 m (5' 10.08\").    Weight as of this encounter: 121.6 kg (268 lb).  Physical Exam  GENERAL: alert and no distress  EYES: Eyes grossly normal to inspection, PERRL and conjunctivae and sclerae normal  NECK: no adenopathy, no asymmetry, masses, or scars. No thyromegaly or nodules palpated.  RESP: lungs " clear to auscultation - no rales, rhonchi or wheezes  CV: regular rate and rhythm, normal S1 S2, no S3 or S4, no murmur, click or rub, mild non-pitting peripheral edema  ABDOMEN: soft, nontender, no hepatosplenomegaly, no masses and bowel sounds normal  NEURO: Normal strength and tone, mentation intact and speech normal  PSYCH: mentation appears normal, affect normal/bright    Recent Labs   Lab Test 05/16/25  0922 05/06/25  0827 12/27/24  0955 10/24/24  1418 09/05/24  1630 09/05/24  0633 09/03/24  0701   HGB  --   --   --   --   --  13.1 14.4   PLT  --   --   --   --   --  164 166    139   < > 140   < > 139 137   POTASSIUM 4.3 3.8   < > 3.9   < > 3.3* 3.5   CR 1.01* 0.99*   < > 0.72   < > 0.63 0.58   A1C  --   --   --  7.0*  --   --  7.1*    < > = values in this interval not displayed.        Revised Cardiac Risk Index (RCRI)  The patient has the following serious cardiovascular risks for perioperative complications:   - No serious cardiac risks = 0 points     RCRI Interpretation: 0 points: Class I (very low risk - 0.4% complication rate)         Signed Electronically by: Roby Bunch PA-C  A copy of this evaluation report is provided to the requesting physician.

## 2025-06-04 NOTE — PATIENT INSTRUCTIONS
How to Take Your Medication Before Surgery  Preoperative Medication Instructions   Additional Medication Instructions  Take all scheduled medications on the day of surgery EXCEPT for modifications listed below:  - Herbal medications and vitamins: DO NOT TAKE 14 days prior to surgery including multi-vitamin, flaxseed, and multivitamin.   - Jardiance: HOLD 3 days prior to surgery.   - Lasix: I know you're not taking this right now but do make sure you HOLD day of surgery.   - Aldactone: HOLD day of surgery.        Patient Education   Preparing for Your Surgery  For Adults  Getting started  In most cases, a nurse will call to review your health history and instructions. They will give you an arrival time based on your scheduled surgery time. Please be ready to share:  Your doctor's clinic name and phone number  Your medical, surgical, and anesthesia history  A list of allergies and sensitivities  A list of medicines, including herbal treatments and over-the-counter drugs  Whether the patient has a legal guardian (ask how to send us the papers in advance)  Note: You may not receive a call if you were seen at our PAC (Preoperative Assessment Center).  Please tell us if you're pregnant--or if there's any chance you might be pregnant. Some surgeries may injure a fetus (unborn baby), so they require a pregnancy test. Surgeries that are safe for a fetus don't always need a test, and you can choose whether to have one.   Preparing for surgery  Within 10 to 30 days of surgery: Have a pre-op exam (sometimes called an H&P, or History and Physical). This can be done at a clinic or pre-operative center.  If you're having a , you may not need this exam. Talk to your care team.  At your pre-op exam, talk to your care team about all medicines you take. (This includes CBD oil and any drugs, such as THC, marijuana, and other forms of cannabis.) If you need to stop any medicine before surgery, ask when to start taking it  again.  This is for your safety. Many medicines and drugs can make you bleed too much during surgery. Some change how well surgery (anesthesia) drugs work.  Call your insurance company to let them know you're having surgery. (If you don't have insurance, call [phone].)  Call your clinic if there's any change in your health. This includes a scrape or scratch near the surgery site, or any signs of a cold (sore throat, runny nose, cough, rash, fever).  Eating and drinking guidelines  For your safety: Unless your surgeon tells you otherwise, follow the guidelines below.  Eat and drink as normal until 8 hours before you arrive for surgery. After that, no food or milk. You can spit out gum when you arrive.  Drink clear liquids until 2 hours before you arrive. These are liquids you can see through, like water, Gatorade, and Propel Water. They also include plain black coffee and tea (no cream or milk).  No alcohol for 24 hours before you arrive. The night before surgery, stop any drinks that contain THC.  If your care team tells you to take medicine on the morning of surgery, it's okay to take it with a sip of water. No other medicines or drugs are allowed (including CBD oil)--follow your care team's instructions.  If you have questions the day of surgery, call your hospital or surgery center.   Preventing infection  Shower or bathe the night before and the morning of surgery. Follow the instructions your clinic gave you. (If no instructions, use regular soap.)  Don't shave or clip hair near your surgery site. We'll remove the hair if needed.  Don't smoke or vape the morning of surgery. No chewing tobacco for 6 hours before you arrive. A nicotine patch is okay. You may spit out nicotine gum when you arrive.  For some surgeries, the surgeon will tell you to fully quit smoking and nicotine.  We will make every effort to keep you safe from infection. We will:  Clean our hands often with soap and water (or an alcohol-based  hand rub).  Clean the skin at your surgery site with a special soap that kills germs.  Give you a special gown to keep you warm. (Cold raises the risk of infection.)  Wear hair covers, masks, gowns, and gloves during surgery.  Give antibiotic medicine, if prescribed. Not all surgeries need this medicine.  What to bring on the day of surgery  Photo ID and insurance card  Copy of your health care directive, if you have one  Glasses and hearing aids (bring cases)  You can't wear contacts during surgery  Inhaler and eye drops, if you use them (tell us about these when you arrive)  CPAP machine or breathing device, if you use them  A few personal items, if spending the night  If you have . . .  A pacemaker, ICD (cardiac defibrillator), or other implant: Bring the ID card.  An implanted stimulator: Bring the remote control.  A legal guardian: Bring a copy of the certified (court-stamped) guardianship papers.  Please remove any jewelry, including body piercings. Leave jewelry and other valuables at home.  If you're going home the day of surgery  You must have a support person drive you home. They should stay with you overnight, and they may need to help with your self-care.  If you don't have a support person, please tells us as soon as possible. We can help.  After surgery  If it's hard to control your pain or you need more pain medicine, please call your surgeon's office.  Questions?   If you have any questions for your care team, list them here:   ____________________________________________________________________________________________________________________________________________________________________________________________________________________________________________________________  For informational purposes only. Not to replace the advice of your health care provider. Copyright   2003, 2019 [facility]. All rights reserved. Clinically reviewed by Bib Chun MD. [employer] [id number] - REV  02/25.

## 2025-06-05 ENCOUNTER — PATIENT OUTREACH (OUTPATIENT)
Dept: CARE COORDINATION | Facility: CLINIC | Age: 65
End: 2025-06-05
Payer: COMMERCIAL

## 2025-06-05 ENCOUNTER — RESULTS FOLLOW-UP (OUTPATIENT)
Dept: FAMILY MEDICINE | Facility: CLINIC | Age: 65
End: 2025-06-05

## 2025-06-05 DIAGNOSIS — E87.5 HYPERKALEMIA: Primary | ICD-10-CM

## 2025-06-05 DIAGNOSIS — N18.31 STAGE 3A CHRONIC KIDNEY DISEASE (H): ICD-10-CM

## 2025-06-06 ENCOUNTER — HOSPITAL ENCOUNTER (OUTPATIENT)
Facility: CLINIC | Age: 65
Discharge: HOME | End: 2025-06-06
Attending: OBSTETRICS & GYNECOLOGY | Admitting: OBSTETRICS & GYNECOLOGY
Payer: COMMERCIAL

## 2025-06-06 VITALS
RESPIRATION RATE: 14 BRPM | WEIGHT: 268.08 LBS | OXYGEN SATURATION: 95 % | HEART RATE: 69 BPM | TEMPERATURE: 96.8 F | BODY MASS INDEX: 37.53 KG/M2 | SYSTOLIC BLOOD PRESSURE: 113 MMHG | HEIGHT: 71 IN | DIASTOLIC BLOOD PRESSURE: 72 MMHG

## 2025-06-06 DIAGNOSIS — Z98.890 STATUS POST HYSTEROSCOPY: Primary | ICD-10-CM

## 2025-06-06 LAB — GLUCOSE BLDC GLUCOMTR-MCNC: 164 MG/DL (ref 70–99)

## 2025-06-06 PROCEDURE — 360N000076 HC SURGERY LEVEL 3, PER MIN: Performed by: OBSTETRICS & GYNECOLOGY

## 2025-06-06 PROCEDURE — 999N000141 HC STATISTIC PRE-PROCEDURE NURSING ASSESSMENT: Performed by: OBSTETRICS & GYNECOLOGY

## 2025-06-06 PROCEDURE — 88305 TISSUE EXAM BY PATHOLOGIST: CPT | Mod: TC | Performed by: OBSTETRICS & GYNECOLOGY

## 2025-06-06 PROCEDURE — 250N000009 HC RX 250: Performed by: OBSTETRICS & GYNECOLOGY

## 2025-06-06 PROCEDURE — 58558 HYSTEROSCOPY BIOPSY: CPT | Mod: GC | Performed by: OBSTETRICS & GYNECOLOGY

## 2025-06-06 PROCEDURE — 370N000017 HC ANESTHESIA TECHNICAL FEE, PER MIN: Performed by: OBSTETRICS & GYNECOLOGY

## 2025-06-06 PROCEDURE — 250N000013 HC RX MED GY IP 250 OP 250 PS 637: Performed by: OBSTETRICS & GYNECOLOGY

## 2025-06-06 PROCEDURE — 710N000012 HC RECOVERY PHASE 2, PER MINUTE: Performed by: OBSTETRICS & GYNECOLOGY

## 2025-06-06 PROCEDURE — 82962 GLUCOSE BLOOD TEST: CPT

## 2025-06-06 PROCEDURE — 88305 TISSUE EXAM BY PATHOLOGIST: CPT | Mod: 26 | Performed by: PATHOLOGY

## 2025-06-06 PROCEDURE — 272N000001 HC OR GENERAL SUPPLY STERILE: Performed by: OBSTETRICS & GYNECOLOGY

## 2025-06-06 RX ORDER — ACETAMINOPHEN 325 MG/1
975 TABLET ORAL ONCE
Status: DISCONTINUED | OUTPATIENT
Start: 2025-06-06 | End: 2025-06-06 | Stop reason: HOSPADM

## 2025-06-06 RX ORDER — ACETAMINOPHEN 325 MG/1
975 TABLET ORAL ONCE
Status: COMPLETED | OUTPATIENT
Start: 2025-06-06 | End: 2025-06-06

## 2025-06-06 RX ORDER — LIDOCAINE HYDROCHLORIDE 10 MG/ML
INJECTION, SOLUTION INFILTRATION; PERINEURAL PRN
Status: DISCONTINUED | OUTPATIENT
Start: 2025-06-06 | End: 2025-06-06 | Stop reason: HOSPADM

## 2025-06-06 RX ORDER — IBUPROFEN 800 MG/1
800 TABLET, FILM COATED ORAL EVERY 6 HOURS PRN
COMMUNITY
Start: 2025-06-06

## 2025-06-06 RX ORDER — ACETAMINOPHEN 325 MG/1
975 TABLET ORAL EVERY 6 HOURS PRN
COMMUNITY
Start: 2025-06-06

## 2025-06-06 RX ADMIN — ACETAMINOPHEN 975 MG: 325 TABLET ORAL at 06:59

## 2025-06-06 ASSESSMENT — ACTIVITIES OF DAILY LIVING (ADL)
ADLS_ACUITY_SCORE: 43

## 2025-06-06 NOTE — BRIEF OP NOTE
[facility]    Brief Operative Note    Pre-operative diagnosis: Thickened endometrium [R93.89]  Postmenopausal bleeding [N95.0]  Post-operative diagnosis Same as pre-operative diagnosis    Procedure: HYSTEROSCOPY, WITH DILATION AND CURETTAGE OF UTERUS USING MORCELLATOR, N/A - Uterus    Surgeon: Surgeons and Role:     * Milly Matt MD - Primary     * Dee Jones DO - Resident - Assisting  Anesthesia: MAC   Estimated Blood Loss: 5 mL  Fluid deficit: 245 mL  Fluids: 500 mL    Drains: None  Specimens:   ID Type Source Tests Collected by Time Destination   1 :  Curettings Endometrium SURGICAL PATHOLOGY EXAM Milly Matt MD 6/6/2025  9:40 AM      Findings:   Small anteverted uterus, cervical os stenotic in feel, no adnexal masses. Normal external genitalia, vaginal mucosa pale with atrophic changes, no loss of architecture. Uterus patent with two distinct polyps and focal area of endometrial overgrowth at 12 o clock on anterior uterine wall. Other areas of proliferative tissue at cornuae.  Complications: None.  Implants: * No implants in log *    Dee Jones DO, MPH  Obstetrics and Gynecology, PGY-1  06/06/25, 9:50 AM

## 2025-06-06 NOTE — OP NOTE
Operative Note  Hysteroscopy with D&C    Patient: Lesli Jackson  MRN: [id number]  Date: 2025     Preoperative diagnosis:  Postmenopausal bleeding, thickened endometrium  Postoperative diagnosis:  Same as above, endometrial polyps, s/p below stated procedure  Procedure:  EUA, operative hysteroscopy, polypectomy, dilation and curettage with morcellation   Surgeon: Milly Matt MD  Assistant: Dee Jones DO, MPH, PGY-1  Anesthesia:  Moderate sedation under MAC, Paracervical block  Specimens: Endometrial curettings  Complications: None apparent    EBL:  5 mL  IVF:  500 mL  UOP:  Patient voided prior to procedure  Fluid deficit:  245 mL normal saline    Findings:  Small anteverted uterus, cervical os stenotic in feel, no adnexal masses. Normal external genitalia, vaginal mucosa pale with atrophic changes, no loss of architecture. Cervix small and patent. Uterine cavity patent with two distinct polyps and focal area of endometrial overgrowth at 12 o clock on anterior uterine wall. Other areas of tissue overgrowth at cornuae obscuring clear view of tubal ostia. Global tissue sampling obtained.     Indications:  Lesli Jackson is a 64 year old  who presented with post-menopausal bleeding. Ultrasound showed thickened endometrium measuring 9mm with suspicion for 2 polyps. Hysteroscopic dilation and curettage recommended. Risks, benefits, and alternatives to the procedure were discussed with the patient who elected to proceed.  All questions were answered and an informed consent was obtained.    Procedure:  The patient was taken to the operating room where she underwent moderate sedation under monitored anesthesia care without difficulty.  She was placed in a dorsal lithotomy position using Yellowfin stirrups.  The patient was examined for the above noted findings and then prepped and draped in the usual sterile fashion. A medium graves speculum was inserted into the vagina. 1 mL 1% plain lidocaine was injected into  the cervix at 12 o'clock position. A tenaculum was placed horizontally on the anterior cervical lip.  A paracervical block was administered with a total of 19 mL 1% plain lidocaine at the 4 and 8 o'clock positions. The endocervical canal was serially dilated to 6.5 mm using Hegar dilators.  The hysteroscope was inserted without difficulty with the above findings noted. Morcellation of polyps and global sampling of endometrial tissue was performed using MyoSure Reach.  The tissue was sent to pathology.  All instruments were then removed.  The tenaculum was removed from the cervix and the puncture sites were hemostatic.  The patient was repositioned to the supine position.  The patient tolerated the procedure well and was taken to the recovery room in stable condition.  Dr. Matt was scrubbed and present for the entire procedure.    Dee Jones DO, MPH  Obstetrics and Gynecology, PGY-1  06/06/25, 11:03 AM    ATTESTATION:   I was scrubbed and present for the entire procedure. I agree with the above note which I have edited to reflect my findings.   Milly Matt MD

## 2025-06-09 ENCOUNTER — OFFICE VISIT (OUTPATIENT)
Dept: CARDIOLOGY | Facility: CLINIC | Age: 65
End: 2025-06-09
Attending: INTERNAL MEDICINE
Payer: COMMERCIAL

## 2025-06-09 ENCOUNTER — RESULTS FOLLOW-UP (OUTPATIENT)
Dept: OBGYN | Facility: CLINIC | Age: 65
End: 2025-06-09

## 2025-06-09 VITALS
BODY MASS INDEX: 37.56 KG/M2 | WEIGHT: 269.3 LBS | OXYGEN SATURATION: 98 % | DIASTOLIC BLOOD PRESSURE: 74 MMHG | SYSTOLIC BLOOD PRESSURE: 111 MMHG | HEART RATE: 71 BPM

## 2025-06-09 DIAGNOSIS — I50.30 HEART FAILURE WITH PRESERVED EJECTION FRACTION, NYHA CLASS II (H): Primary | ICD-10-CM

## 2025-06-09 DIAGNOSIS — E78.5 DYSLIPIDEMIA: ICD-10-CM

## 2025-06-09 DIAGNOSIS — R60.0 BILATERAL LOWER EXTREMITY EDEMA: ICD-10-CM

## 2025-06-09 DIAGNOSIS — I10 ESSENTIAL HYPERTENSION WITH GOAL BLOOD PRESSURE LESS THAN 140/90: ICD-10-CM

## 2025-06-09 LAB
PATH REPORT.COMMENTS IMP SPEC: NORMAL
PATH REPORT.COMMENTS IMP SPEC: NORMAL
PATH REPORT.FINAL DX SPEC: NORMAL
PATH REPORT.GROSS SPEC: NORMAL
PATH REPORT.RELEVANT HX SPEC: NORMAL
PHOTO IMAGE: NORMAL

## 2025-06-09 PROCEDURE — 3078F DIAST BP <80 MM HG: CPT | Performed by: INTERNAL MEDICINE

## 2025-06-09 PROCEDURE — 99214 OFFICE O/P EST MOD 30 MIN: CPT | Performed by: INTERNAL MEDICINE

## 2025-06-09 PROCEDURE — 99213 OFFICE O/P EST LOW 20 MIN: CPT | Performed by: INTERNAL MEDICINE

## 2025-06-09 PROCEDURE — 1126F AMNT PAIN NOTED NONE PRSNT: CPT | Performed by: INTERNAL MEDICINE

## 2025-06-09 PROCEDURE — 3074F SYST BP LT 130 MM HG: CPT | Performed by: INTERNAL MEDICINE

## 2025-06-09 RX ORDER — LOVASTATIN 20 MG/1
10 TABLET ORAL AT BEDTIME
Qty: 45 TABLET | Refills: 1 | Status: SHIPPED | OUTPATIENT
Start: 2025-06-09

## 2025-06-09 ASSESSMENT — PAIN SCALES - GENERAL: PAINLEVEL_OUTOF10: NO PAIN (0)

## 2025-06-09 NOTE — PROGRESS NOTES
History:    Lesli Jackson is a very pleasant 64 year old woman with    COPD  Class II obesity  Essential Hypertension  T2DM  HLD  Tobacco Use Disorder, quit 2024    Presenting for follow up of HFpEF     She was admitted to the hospital in 9/2024 for COPD exacerbation. She underwent a transthoracic echocardiogram notable for biventricular function, concentric remodeling, elevated LV pressures by E/E', moderate mitral annular calcification, dilated IVC. She was discharged with Home O2. She has not been smoking since then.    She informs that she is no longer using oxygen at home. She has been compliant with her medications.  Her exercise capacity is limited due to knee pain.  She notes a long standing history of leg swelling but overall much improved she states.  She does not have any orthopnea, PND, palpitations, syncope.    Her family history significant for brother having aortic dissection    Her BP is well controlled. She takes lisinopril 40 mg, carvedilol 6.25 mg bid and hydrochlorothiazide 25 mg daily, aldactone 12.5 mg daily, Jardiance 10 mg daily    Her LDL is 70 on lovastatin 10 mg      Allergies - reviewed     Allergies   Allergen Reactions    Dust Mites     Amoxicillin Rash       Past history -reviewed  Past Medical History:   Diagnosis Date    Abnormal vaginal bleeding 11/10/2016    Baker's cyst of knee     Lt (7.3x4.3x1.6 cm)    Elevated glucose     Hyperlipidemia LDL goal <130     Hypertension goal BP (blood pressure) < 140/90     Hypoxia 09/02/2024    Idiopathic edema     NONSPECIFIC MEDICAL HISTORY     nephrolithiasis    Personal history of smoking     quit in 2010    Secondary lymphedema 8/4/19    DX by [facility] [employer] [place], lose wt. at this point    Unspecified iridocyclitis         Social history - reviewed  Former smoker    Family history -reviewed  Father with CAD  No sudden death     ROS: non contributory on the 10-point review of system    Exam:   /74 (BP Location: Right arm, Patient Position:  Chair, Cuff Size: Adult Large)   Pulse 71   Wt 122.2 kg (269 lb 4.8 oz)   LMP 12/22/2012   SpO2 98%   BMI 37.56 kg/m      LMP 12/22/2012   In general, the patient is in no apparent distress.      HEENT: Sclerae white, not injected.    Neck: No JVD. No thyromegaly  Heart: RRR. Normal S1, S2. No murmur, rub, click, or gallop.    Lungs: Decreased lung sounds bilaterally  Extremities: 3-4+ pretibial edema. The pulses are 2+at the radial bilaterally.      I have independently reviewed this patient's relevant laboratory and cardiac data :    Recent Labs   Lab Test 10/24/24  1418 03/08/24  1022   CHOL 161 163   HDL 55 44*   LDL 70 83   TRIG 181* 179*      Recent Labs   Lab Test 09/02/24  1539 03/08/24  1022   AST 38 28     Recent Labs   Lab Test 09/02/24  1539 03/08/24  1022   ALT 20 19     Recent Labs   Lab Test 06/04/25  0922 05/16/25  0922   * 135   POTASSIUM 5.7* 4.3   CHLORIDE 100 98   CO2 23 27   ANIONGAP 10 10   BUN 36.1* 19.6   CR 1.28* 1.01*     Recent Labs   Lab Test 06/04/25  0922 09/05/24  0633   WBC 9.8 8.9   RBC 4.31 4.31   HGB 13.1 13.1   MCV 94 93    164     Recent Labs   Lab Test 06/04/25  0922 10/24/24  1418   A1C 7.2* 7.0*     Recent Labs   Lab Test 11/26/19  0912 11/16/18  0922   TSH 3.97 2.97     NT proBNP 57 (12/2024)    ECG 9/2024: NSR, RBBB, LAFB    TTEcho 9/2024:  normal LV size and systolic fx, concentric remodeling, abnormal diastolic fx. 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 Her echocardiogram from her September 2024 showed diastolic  dysfunction, elevated right and left-sided filling pressures.  She also has mild pulmonary hypertension, likely class II. She has preserved biventricular function. She does not endorse dyspnea on exertion however her exercise capacity is limited by her knees. She has responded well to medical therapy. Her blood pressure is controlled on her current regimen. She does not have any symptoms concerning for angina.    Will continue diuretic, MRA and SGLT2i. BP is well managed on coreg and ACEi. K is being monitored on lower dose of aldactone.      Recommendations:   Continue current medications  BMP in a week to check K  Increase exercise time daily - aim for 30 mn daily  Follow in 1 year    Stefani Haynes MD, MS  Professor of Medicine  Cardiovascular Medicine

## 2025-06-09 NOTE — NURSING NOTE
Chief Complaint   Patient presents with    Follow Up     Return Cardiology        Vitals were taken, medications reconciled     Kalyan Watson, Clinic Assistant     11:19 AM

## 2025-06-09 NOTE — TELEPHONE ENCOUNTER
Lesli Jackson is a 64 year old who underwent hysteroscopy D&C on 6/6. I called patient to follow up to see how she is feeling after the procedure and to discuss pathology.    No answer. Left voicemail and sent Shoebox message.    Pathology showed   A. Endometrial curettings:  - Endometrial polyp, negative for hyperplasia or atypia.    Milly Matt MD

## 2025-06-09 NOTE — LETTER
6/9/2025      RE: Lesli Jackson  [address]  [facility] [address]       Dear Colleague,    Thank you for the opportunity to participate in the care of your patient, Lesli Jackson, at the [facility] [place] [facility]. Please see a copy of my visit note below.    History:    Lesli Jackson is a very pleasant 64 year old woman with    COPD  Class II obesity  Essential Hypertension  T2DM  HLD  Tobacco Use Disorder, quit 2024    Presenting for follow up of HFpEF     She was admitted to the hospital in 9/2024 for COPD exacerbation. She underwent a transthoracic echocardiogram notable for biventricular function, concentric remodeling, elevated LV pressures by E/E', moderate mitral annular calcification, dilated IVC. She was discharged with Home O2. She has not been smoking since then.    She informs that she is no longer using oxygen at home. She has been compliant with her medications.  Her exercise capacity is limited due to knee pain.  She notes a long standing history of leg swelling but overall much improved she states.  She does not have any orthopnea, PND, palpitations, syncope.    Her family history significant for brother having aortic dissection    Her BP is well controlled. She takes lisinopril 40 mg, carvedilol 6.25 mg bid and hydrochlorothiazide 25 mg daily, aldactone 12.5 mg daily, Jardiance 10 mg daily    Her LDL is 70 on lovastatin 10 mg      Allergies - reviewed     Allergies   Allergen Reactions     Dust Mites      Amoxicillin Rash       Past history -reviewed  Past Medical History:   Diagnosis Date     Abnormal vaginal bleeding 11/10/2016     Baker's cyst of knee     Lt (7.3x4.3x1.6 cm)     Elevated glucose      Hyperlipidemia LDL goal <130      Hypertension goal BP (blood pressure) < 140/90      Hypoxia 09/02/2024     Idiopathic edema      NONSPECIFIC MEDICAL HISTORY     nephrolithiasis     Personal history of smoking     quit in  2010     Secondary lymphedema 8/4/19    DX by [facility] [employer] [place], lose wt. at this point     Unspecified iridocyclitis         Social history - reviewed  Former smoker    Family history -reviewed  Father with CAD  No sudden death     ROS: non contributory on the 10-point review of system    Exam:   /74 (BP Location: Right arm, Patient Position: Chair, Cuff Size: Adult Large)   Pulse 71   Wt 122.2 kg (269 lb 4.8 oz)   LMP 12/22/2012   SpO2 98%   BMI 37.56 kg/m      LMP 12/22/2012   In general, the patient is in no apparent distress.      HEENT: Sclerae white, not injected.    Neck: No JVD. No thyromegaly  Heart: RRR. Normal S1, S2. No murmur, rub, click, or gallop.    Lungs: Decreased lung sounds bilaterally  Extremities: 3-4+ pretibial edema. The pulses are 2+at the radial bilaterally.      I have independently reviewed this patient's relevant laboratory and cardiac data :    Recent Labs   Lab Test 10/24/24  1418 03/08/24  1022   CHOL 161 163   HDL 55 44*   LDL 70 83   TRIG 181* 179*      Recent Labs   Lab Test 09/02/24  1539 03/08/24  1022   AST 38 28     Recent Labs   Lab Test 09/02/24  1539 03/08/24  1022   ALT 20 19     Recent Labs   Lab Test 06/04/25  0922 05/16/25  0922   * 135   POTASSIUM 5.7* 4.3   CHLORIDE 100 98   CO2 23 27   ANIONGAP 10 10   BUN 36.1* 19.6   CR 1.28* 1.01*     Recent Labs   Lab Test 06/04/25  0922 09/05/24  0633   WBC 9.8 8.9   RBC 4.31 4.31   HGB 13.1 13.1   MCV 94 93    164     Recent Labs   Lab Test 06/04/25  0922 10/24/24  1418   A1C 7.2* 7.0*     Recent Labs   Lab Test 11/26/19  0912 11/16/18  0922   TSH 3.97 2.97     NT proBNP 57 (12/2024)    ECG 9/2024: NSR, RBBB, LAFB    TTEcho 9/2024:  normal LV size and systolic fx, concentric remodeling, abnormal diastolic fx. 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 Her echocardiogram from her September 2024 showed diastolic dysfunction, elevated right and left-sided filling pressures.  She also has mild pulmonary hypertension, likely class II. She has preserved biventricular function. She does not endorse dyspnea on exertion however her exercise capacity is limited by her knees. She has responded well to medical therapy. Her blood pressure is controlled on her current regimen. She does not have any symptoms concerning for angina.    Will continue diuretic, MRA and SGLT2i. BP is well managed on coreg and ACEi. K is being monitored on lower dose of aldactone.      Recommendations:   Continue current medications  BMP in a week to check K  Increase exercise time daily - aim for 30 mn daily  Follow in 1 year    Stefani Haynes MD, MS  Professor of Medicine  Cardiovascular Medicine          Please do not hesitate to contact me if you have any questions/concerns.     Sincerely,     Stefani Haynes MD

## 2025-06-09 NOTE — PATIENT INSTRUCTIONS
Plan:   Continue current medications  BMP in a week. Please schedule lab draw  Increase exercise time daily - aim for 30 minutes daily  Follow up with cardiology in 1 year      Your Care Team:         Cardiology   Telephone Number     Mary Baltazar RN [phone]    After business hours: [phone], ask for cardiologist on-call           On-call cardiologist for after hours or on weekends:    [phone], option #4, and ask to speak to the on-call cardiologist.    Cardiovascular Clinic:   [address] [place] [address]. [place] [address]      As always, thank you for trusting us with your health care needs!    If you have further questions, please utilize [employer] to contact us.

## 2025-06-12 ENCOUNTER — VIRTUAL VISIT (OUTPATIENT)
Dept: OBGYN | Facility: CLINIC | Age: 65
End: 2025-06-12
Payer: COMMERCIAL

## 2025-06-12 DIAGNOSIS — Z98.890 STATUS POST HYSTEROSCOPIC POLYPECTOMY: Primary | ICD-10-CM

## 2025-06-12 NOTE — PROGRESS NOTES
Virtual Visit Details  Type of service:  Video Visit   Originating Location (pt. Location): Home  Distant Location (provider location):  On-site  Platform used for Video Visit: Estefani Jackson is a 64 year old who underwent hysteroscopy D&C on 6/6 for evaluation of postmenopausal bleeding and thickened endometrium with concern for polyp. Pathology from procedure confirmed benign polyps.     A little cramping and spotting.   Eating, going to the bathroom, everything back to normal.   Questions about recurrence.    Pathology showed   A. Endometrial curettings:  - Endometrial polyp, negative for hyperplasia or atypia.       Doing well postoperatively  1. Status post hysteroscopic polypectomy (Primary)  Follow up PRN.      Joined the call at 6/12/2025, 9:39:35 am.  Left the call at 6/12/2025, 9:43:48 am.  You were on the call for 4 minutes 13 seconds.      Milly Matt MD

## 2025-06-16 ENCOUNTER — LAB (OUTPATIENT)
Dept: LAB | Facility: CLINIC | Age: 65
End: 2025-06-16
Payer: COMMERCIAL

## 2025-06-16 DIAGNOSIS — I10 ESSENTIAL HYPERTENSION WITH GOAL BLOOD PRESSURE LESS THAN 140/90: ICD-10-CM

## 2025-06-16 DIAGNOSIS — I50.30 HEART FAILURE WITH PRESERVED EJECTION FRACTION, NYHA CLASS II (H): ICD-10-CM

## 2025-06-16 DIAGNOSIS — R60.0 BILATERAL LOWER EXTREMITY EDEMA: ICD-10-CM

## 2025-06-16 LAB
ANION GAP SERPL CALCULATED.3IONS-SCNC: 14 MMOL/L (ref 7–15)
BUN SERPL-MCNC: 39.8 MG/DL (ref 8–23)
CALCIUM SERPL-MCNC: 9.9 MG/DL (ref 8.8–10.4)
CHLORIDE SERPL-SCNC: 98 MMOL/L (ref 98–107)
CREAT SERPL-MCNC: 1.22 MG/DL (ref 0.51–0.95)
EGFRCR SERPLBLD CKD-EPI 2021: 49 ML/MIN/1.73M2
GLUCOSE SERPL-MCNC: 169 MG/DL (ref 70–99)
HCO3 SERPL-SCNC: 23 MMOL/L (ref 22–29)
POTASSIUM SERPL-SCNC: 5.1 MMOL/L (ref 3.4–5.3)
SODIUM SERPL-SCNC: 135 MMOL/L (ref 135–145)

## 2025-06-16 PROCEDURE — 36415 COLL VENOUS BLD VENIPUNCTURE: CPT

## 2025-06-16 PROCEDURE — 80048 BASIC METABOLIC PNL TOTAL CA: CPT

## 2025-06-30 ENCOUNTER — TELEPHONE (OUTPATIENT)
Dept: PULMONOLOGY | Facility: CLINIC | Age: 65
End: 2025-06-30
Payer: COMMERCIAL

## 2025-06-30 ENCOUNTER — MYC MEDICAL ADVICE (OUTPATIENT)
Dept: CARDIOLOGY | Facility: CLINIC | Age: 65
End: 2025-06-30
Payer: COMMERCIAL

## 2025-06-30 NOTE — TELEPHONE ENCOUNTER
Patient Contacted for the patient to call back and schedule the following:    Appointment type: rtn  Provider: Ute  Return date: Nov 2025  Specialty phone number: [phone]  Additional appointment(s) needed: na  Additonal Notes: na

## 2025-07-26 ENCOUNTER — HEALTH MAINTENANCE LETTER (OUTPATIENT)
Age: 65
End: 2025-07-26

## 2025-08-06 ENCOUNTER — MYC MEDICAL ADVICE (OUTPATIENT)
Dept: CARDIOLOGY | Facility: CLINIC | Age: 65
End: 2025-08-06
Payer: COMMERCIAL

## 2025-08-06 DIAGNOSIS — I50.30 HEART FAILURE WITH PRESERVED EJECTION FRACTION, NYHA CLASS II (H): Primary | ICD-10-CM

## 2025-08-06 RX ORDER — LISINOPRIL 10 MG/1
10 TABLET ORAL DAILY
Qty: 90 TABLET | Refills: 3 | Status: SHIPPED | OUTPATIENT
Start: 2025-08-06

## (undated) DEVICE — DILATOR PROBE UTERINE OS CANAL FINDER 260-610

## (undated) DEVICE — SOLUTION IV IRRIGATION 0.9% NACL 3L R8206

## (undated) DEVICE — STRAP POSITIONING 60X31" BODY KNEE KBS 01

## (undated) DEVICE — SOLIDIFIER (USE FOR UP TO 1500CC) MSOLID1500

## (undated) DEVICE — GLOVE BIOGEL PI MICRO INDICATOR UNDERGLOVE SZ 7.0 48970

## (undated) DEVICE — UNDERPAD 36X30 PREMIERPRO MAX ABS NS LF 676111

## (undated) DEVICE — GLOVE BIOGEL PI ULTRATOUCH G SZ 6.5 42165

## (undated) DEVICE — LINEN TOWEL PACK X5 5464

## (undated) DEVICE — KIT PROCEDURE FLUENT IN/OUT FLOWPAK TISS TRAP FLT-112S

## (undated) DEVICE — SEAL SET MYOSURE ROD LENS SCOPE SINGLE USE 40-902

## (undated) DEVICE — Device

## (undated) DEVICE — PREP DYNA-HEX 4% CHG SCRUB 4OZ BOTTLE MDS098710

## (undated) DEVICE — LINEN GOWN X4 5410

## (undated) RX ORDER — FENTANYL CITRATE 50 UG/ML
INJECTION, SOLUTION INTRAMUSCULAR; INTRAVENOUS
Status: DISPENSED
Start: 2025-06-06

## (undated) RX ORDER — ACETAMINOPHEN 325 MG/1
TABLET ORAL
Status: DISPENSED
Start: 2025-06-06

## (undated) RX ORDER — LIDOCAINE HYDROCHLORIDE 10 MG/ML
INJECTION, SOLUTION EPIDURAL; INFILTRATION; INTRACAUDAL; PERINEURAL
Status: DISPENSED
Start: 2025-06-06